# Patient Record
Sex: FEMALE | Race: OTHER | HISPANIC OR LATINO | Employment: UNEMPLOYED | URBAN - METROPOLITAN AREA
[De-identification: names, ages, dates, MRNs, and addresses within clinical notes are randomized per-mention and may not be internally consistent; named-entity substitution may affect disease eponyms.]

---

## 2017-05-02 ENCOUNTER — HOSPITAL ENCOUNTER (EMERGENCY)
Facility: HOSPITAL | Age: 27
Discharge: HOME | End: 2017-05-02
Attending: EMERGENCY MEDICINE
Payer: COMMERCIAL

## 2017-05-02 VITALS
RESPIRATION RATE: 18 BRPM | DIASTOLIC BLOOD PRESSURE: 84 MMHG | HEART RATE: 102 BPM | TEMPERATURE: 98.8 F | WEIGHT: 230 LBS | OXYGEN SATURATION: 98 % | HEIGHT: 62 IN | SYSTOLIC BLOOD PRESSURE: 141 MMHG | BODY MASS INDEX: 42.33 KG/M2

## 2017-05-02 DIAGNOSIS — J02.9 VIRAL PHARYNGITIS: Primary | ICD-10-CM

## 2017-05-02 PROCEDURE — 99282 EMERGENCY DEPT VISIT SF MDM: CPT

## 2017-05-02 RX ORDER — NAPROXEN 500 MG/1
500 TABLET ORAL 2 TIMES DAILY WITH MEALS
Qty: 30 TABLET | Refills: 0 | Status: SHIPPED | OUTPATIENT
Start: 2017-05-02 | End: 2020-02-04

## 2017-05-02 RX ORDER — NAPROXEN 500 MG/1
500 TABLET ORAL ONCE
Status: COMPLETED | OUTPATIENT
Start: 2017-05-02 | End: 2017-05-02

## 2017-05-02 RX ADMIN — DEXAMETHASONE SODIUM PHOSPHATE 10 MG: 10 INJECTION, SOLUTION INTRAMUSCULAR; INTRAVENOUS at 18:31

## 2017-05-02 RX ADMIN — NAPROXEN 500 MG: 500 TABLET ORAL at 18:31

## 2019-04-01 ENCOUNTER — TRANSCRIBE ORDERS (OUTPATIENT)
Dept: ADMINISTRATIVE | Facility: HOSPITAL | Age: 29
End: 2019-04-01

## 2019-04-01 DIAGNOSIS — E78.5 HYPERLIPIDEMIA, UNSPECIFIED HYPERLIPIDEMIA TYPE: ICD-10-CM

## 2019-04-01 DIAGNOSIS — E66.9 CLASS 2 OBESITY IN ADULT, UNSPECIFIED BMI, UNSPECIFIED OBESITY TYPE, UNSPECIFIED WHETHER SERIOUS COMORBIDITY PRESENT: ICD-10-CM

## 2019-04-01 DIAGNOSIS — G47.30 SLEEP APNEA, UNSPECIFIED TYPE: Primary | ICD-10-CM

## 2019-04-09 ENCOUNTER — APPOINTMENT (OUTPATIENT)
Dept: LAB | Facility: HOSPITAL | Age: 29
End: 2019-04-09
Payer: COMMERCIAL

## 2019-04-09 DIAGNOSIS — E78.5 HYPERLIPIDEMIA, UNSPECIFIED HYPERLIPIDEMIA TYPE: ICD-10-CM

## 2019-04-09 DIAGNOSIS — E66.9 CLASS 2 OBESITY IN ADULT, UNSPECIFIED BMI, UNSPECIFIED OBESITY TYPE, UNSPECIFIED WHETHER SERIOUS COMORBIDITY PRESENT: ICD-10-CM

## 2019-04-09 DIAGNOSIS — G47.30 SLEEP APNEA, UNSPECIFIED TYPE: ICD-10-CM

## 2019-04-09 LAB
ALBUMIN SERPL BCP-MCNC: 3.6 G/DL (ref 3.5–5)
ALP SERPL-CCNC: 84 U/L (ref 46–116)
ALT SERPL W P-5'-P-CCNC: 24 U/L (ref 12–78)
ANION GAP SERPL CALCULATED.3IONS-SCNC: 4 MMOL/L (ref 4–13)
AST SERPL W P-5'-P-CCNC: 13 U/L (ref 5–45)
BASOPHILS # BLD AUTO: 0.02 THOUSANDS/ΜL (ref 0–0.1)
BASOPHILS NFR BLD AUTO: 0 % (ref 0–1)
BILIRUB SERPL-MCNC: 0.35 MG/DL (ref 0.2–1)
BUN SERPL-MCNC: 12 MG/DL (ref 5–25)
CALCIUM SERPL-MCNC: 8.5 MG/DL (ref 8.3–10.1)
CHLORIDE SERPL-SCNC: 106 MMOL/L (ref 100–108)
CHOLEST SERPL-MCNC: 203 MG/DL (ref 50–200)
CO2 SERPL-SCNC: 25 MMOL/L (ref 21–32)
CREAT SERPL-MCNC: 0.56 MG/DL (ref 0.6–1.3)
EOSINOPHIL # BLD AUTO: 0.16 THOUSAND/ΜL (ref 0–0.61)
EOSINOPHIL NFR BLD AUTO: 2 % (ref 0–6)
ERYTHROCYTE [DISTWIDTH] IN BLOOD BY AUTOMATED COUNT: 14.2 % (ref 11.6–15.1)
EST. AVERAGE GLUCOSE BLD GHB EST-MCNC: 103 MG/DL
GFR SERPL CREATININE-BSD FRML MDRD: 126 ML/MIN/1.73SQ M
GLUCOSE P FAST SERPL-MCNC: 89 MG/DL (ref 65–99)
HBA1C MFR BLD: 5.2 % (ref 4.2–6.3)
HCT VFR BLD AUTO: 36.3 % (ref 34.8–46.1)
HDLC SERPL-MCNC: 55 MG/DL (ref 40–60)
HGB BLD-MCNC: 10.7 G/DL (ref 11.5–15.4)
IMM GRANULOCYTES # BLD AUTO: 0.02 THOUSAND/UL (ref 0–0.2)
IMM GRANULOCYTES NFR BLD AUTO: 0 % (ref 0–2)
LDLC SERPL CALC-MCNC: 129 MG/DL (ref 0–100)
LDLC SERPL DIRECT ASSAY-MCNC: 140 MG/DL (ref 0–100)
LYMPHOCYTES # BLD AUTO: 1.59 THOUSANDS/ΜL (ref 0.6–4.47)
LYMPHOCYTES NFR BLD AUTO: 22 % (ref 14–44)
MCH RBC QN AUTO: 25.2 PG (ref 26.8–34.3)
MCHC RBC AUTO-ENTMCNC: 29.5 G/DL (ref 31.4–37.4)
MCV RBC AUTO: 86 FL (ref 82–98)
MONOCYTES # BLD AUTO: 0.55 THOUSAND/ΜL (ref 0.17–1.22)
MONOCYTES NFR BLD AUTO: 8 % (ref 4–12)
NEUTROPHILS # BLD AUTO: 4.78 THOUSANDS/ΜL (ref 1.85–7.62)
NEUTS SEG NFR BLD AUTO: 68 % (ref 43–75)
NONHDLC SERPL-MCNC: 148 MG/DL
NRBC BLD AUTO-RTO: 0 /100 WBCS
PLATELET # BLD AUTO: 307 THOUSANDS/UL (ref 149–390)
PMV BLD AUTO: 11.1 FL (ref 8.9–12.7)
POTASSIUM SERPL-SCNC: 3.8 MMOL/L (ref 3.5–5.3)
PROT SERPL-MCNC: 7.7 G/DL (ref 6.4–8.2)
RBC # BLD AUTO: 4.24 MILLION/UL (ref 3.81–5.12)
SODIUM SERPL-SCNC: 135 MMOL/L (ref 136–145)
TRIGL SERPL-MCNC: 94 MG/DL
TSH SERPL DL<=0.05 MIU/L-ACNC: 2.62 UIU/ML (ref 0.36–3.74)
WBC # BLD AUTO: 7.12 THOUSAND/UL (ref 4.31–10.16)

## 2019-04-09 PROCEDURE — 80061 LIPID PANEL: CPT

## 2019-04-09 PROCEDURE — 80053 COMPREHEN METABOLIC PANEL: CPT

## 2019-04-09 PROCEDURE — 85025 COMPLETE CBC W/AUTO DIFF WBC: CPT

## 2019-04-09 PROCEDURE — 83036 HEMOGLOBIN GLYCOSYLATED A1C: CPT | Performed by: NURSE PRACTITIONER

## 2019-04-09 PROCEDURE — 83721 ASSAY OF BLOOD LIPOPROTEIN: CPT

## 2019-04-09 PROCEDURE — 36415 COLL VENOUS BLD VENIPUNCTURE: CPT | Performed by: NURSE PRACTITIONER

## 2019-04-09 PROCEDURE — 84443 ASSAY THYROID STIM HORMONE: CPT

## 2019-04-26 ENCOUNTER — TRANSCRIBE ORDERS (OUTPATIENT)
Dept: SLEEP CENTER | Facility: CLINIC | Age: 29
End: 2019-04-26

## 2019-04-26 DIAGNOSIS — G47.30 SLEEP APNEA: Primary | ICD-10-CM

## 2020-02-03 PROCEDURE — 99285 EMERGENCY DEPT VISIT HI MDM: CPT

## 2020-02-04 ENCOUNTER — APPOINTMENT (EMERGENCY)
Dept: RADIOLOGY | Facility: HOSPITAL | Age: 30
End: 2020-02-04
Payer: COMMERCIAL

## 2020-02-04 ENCOUNTER — HOSPITAL ENCOUNTER (EMERGENCY)
Facility: HOSPITAL | Age: 30
Discharge: ANOTHER HOSPITAL | End: 2020-02-04
Attending: EMERGENCY MEDICINE | Admitting: EMERGENCY MEDICINE
Payer: COMMERCIAL

## 2020-02-04 VITALS
OXYGEN SATURATION: 100 % | RESPIRATION RATE: 18 BRPM | TEMPERATURE: 98.2 F | BODY MASS INDEX: 32.6 KG/M2 | WEIGHT: 184 LBS | DIASTOLIC BLOOD PRESSURE: 81 MMHG | SYSTOLIC BLOOD PRESSURE: 118 MMHG | HEIGHT: 63 IN | HEART RATE: 80 BPM

## 2020-02-04 DIAGNOSIS — K81.9 CHOLECYSTITIS: Primary | ICD-10-CM

## 2020-02-04 LAB
ALBUMIN SERPL BCP-MCNC: 3.7 G/DL (ref 3.5–5)
ALP SERPL-CCNC: 115 U/L (ref 46–116)
ALT SERPL W P-5'-P-CCNC: 157 U/L (ref 12–78)
ANION GAP SERPL CALCULATED.3IONS-SCNC: 9 MMOL/L (ref 4–13)
AST SERPL W P-5'-P-CCNC: 456 U/L (ref 5–45)
BACTERIA UR QL AUTO: ABNORMAL /HPF
BASOPHILS # BLD AUTO: 0.01 THOUSANDS/ΜL (ref 0–0.1)
BASOPHILS NFR BLD AUTO: 0 % (ref 0–1)
BILIRUB SERPL-MCNC: 1.19 MG/DL (ref 0.2–1)
BILIRUB UR QL STRIP: ABNORMAL
BUN SERPL-MCNC: 14 MG/DL (ref 5–25)
CALCIUM SERPL-MCNC: 9.2 MG/DL (ref 8.3–10.1)
CHLORIDE SERPL-SCNC: 110 MMOL/L (ref 100–108)
CLARITY UR: CLEAR
CO2 SERPL-SCNC: 22 MMOL/L (ref 21–32)
COLOR UR: YELLOW
COLOR, POC: NORMAL
CREAT SERPL-MCNC: 0.61 MG/DL (ref 0.6–1.3)
EOSINOPHIL # BLD AUTO: 0.03 THOUSAND/ΜL (ref 0–0.61)
EOSINOPHIL NFR BLD AUTO: 1 % (ref 0–6)
ERYTHROCYTE [DISTWIDTH] IN BLOOD BY AUTOMATED COUNT: 15.6 % (ref 11.6–15.1)
EXT PREG TEST URINE: NEGATIVE
EXT. CONTROL ED NAV: NORMAL
GFR SERPL CREATININE-BSD FRML MDRD: 122 ML/MIN/1.73SQ M
GLUCOSE SERPL-MCNC: 99 MG/DL (ref 65–140)
GLUCOSE UR STRIP-MCNC: NEGATIVE MG/DL
HCT VFR BLD AUTO: 32.3 % (ref 34.8–46.1)
HGB BLD-MCNC: 9.1 G/DL (ref 11.5–15.4)
HGB UR QL STRIP.AUTO: ABNORMAL
IMM GRANULOCYTES # BLD AUTO: 0.01 THOUSAND/UL (ref 0–0.2)
IMM GRANULOCYTES NFR BLD AUTO: 0 % (ref 0–2)
KETONES UR STRIP-MCNC: NEGATIVE MG/DL
LEUKOCYTE ESTERASE UR QL STRIP: NEGATIVE
LIPASE SERPL-CCNC: 202 U/L (ref 73–393)
LYMPHOCYTES # BLD AUTO: 0.92 THOUSANDS/ΜL (ref 0.6–4.47)
LYMPHOCYTES NFR BLD AUTO: 16 % (ref 14–44)
MCH RBC QN AUTO: 22 PG (ref 26.8–34.3)
MCHC RBC AUTO-ENTMCNC: 28.2 G/DL (ref 31.4–37.4)
MCV RBC AUTO: 78 FL (ref 82–98)
MONOCYTES # BLD AUTO: 0.42 THOUSAND/ΜL (ref 0.17–1.22)
MONOCYTES NFR BLD AUTO: 7 % (ref 4–12)
NEUTROPHILS # BLD AUTO: 4.55 THOUSANDS/ΜL (ref 1.85–7.62)
NEUTS SEG NFR BLD AUTO: 76 % (ref 43–75)
NITRITE UR QL STRIP: NEGATIVE
NON-SQ EPI CELLS URNS QL MICRO: ABNORMAL /HPF
NRBC BLD AUTO-RTO: 0 /100 WBCS
PH UR STRIP.AUTO: 6 [PH] (ref 4.5–8)
PLATELET # BLD AUTO: 265 THOUSANDS/UL (ref 149–390)
PMV BLD AUTO: 12.3 FL (ref 8.9–12.7)
POTASSIUM SERPL-SCNC: 4.6 MMOL/L (ref 3.5–5.3)
PROT SERPL-MCNC: 8.3 G/DL (ref 6.4–8.2)
PROT UR STRIP-MCNC: ABNORMAL MG/DL
RBC # BLD AUTO: 4.13 MILLION/UL (ref 3.81–5.12)
RBC #/AREA URNS AUTO: ABNORMAL /HPF
SODIUM SERPL-SCNC: 141 MMOL/L (ref 136–145)
SP GR UR STRIP.AUTO: >=1.03 (ref 1–1.03)
UROBILINOGEN UR QL STRIP.AUTO: >=8 E.U./DL
WBC # BLD AUTO: 5.94 THOUSAND/UL (ref 4.31–10.16)
WBC #/AREA URNS AUTO: ABNORMAL /HPF

## 2020-02-04 PROCEDURE — 99243 OFF/OP CNSLTJ NEW/EST LOW 30: CPT | Performed by: SURGERY

## 2020-02-04 PROCEDURE — 83690 ASSAY OF LIPASE: CPT | Performed by: EMERGENCY MEDICINE

## 2020-02-04 PROCEDURE — 99285 EMERGENCY DEPT VISIT HI MDM: CPT | Performed by: EMERGENCY MEDICINE

## 2020-02-04 PROCEDURE — 74177 CT ABD & PELVIS W/CONTRAST: CPT

## 2020-02-04 PROCEDURE — 36415 COLL VENOUS BLD VENIPUNCTURE: CPT | Performed by: EMERGENCY MEDICINE

## 2020-02-04 PROCEDURE — 85025 COMPLETE CBC W/AUTO DIFF WBC: CPT | Performed by: EMERGENCY MEDICINE

## 2020-02-04 PROCEDURE — 80053 COMPREHEN METABOLIC PANEL: CPT | Performed by: EMERGENCY MEDICINE

## 2020-02-04 PROCEDURE — 76705 ECHO EXAM OF ABDOMEN: CPT

## 2020-02-04 PROCEDURE — 96375 TX/PRO/DX INJ NEW DRUG ADDON: CPT

## 2020-02-04 PROCEDURE — 81025 URINE PREGNANCY TEST: CPT | Performed by: EMERGENCY MEDICINE

## 2020-02-04 PROCEDURE — 81001 URINALYSIS AUTO W/SCOPE: CPT

## 2020-02-04 PROCEDURE — 96374 THER/PROPH/DIAG INJ IV PUSH: CPT

## 2020-02-04 RX ORDER — HYDROMORPHONE HCL/PF 1 MG/ML
0.5 SYRINGE (ML) INJECTION ONCE
Status: COMPLETED | OUTPATIENT
Start: 2020-02-04 | End: 2020-02-04

## 2020-02-04 RX ORDER — ONDANSETRON 2 MG/ML
4 INJECTION INTRAMUSCULAR; INTRAVENOUS ONCE
Status: COMPLETED | OUTPATIENT
Start: 2020-02-04 | End: 2020-02-04

## 2020-02-04 RX ADMIN — ONDANSETRON 4 MG: 2 INJECTION INTRAMUSCULAR; INTRAVENOUS at 03:00

## 2020-02-04 RX ADMIN — IOHEXOL 50 ML: 240 INJECTION, SOLUTION INTRATHECAL; INTRAVASCULAR; INTRAVENOUS; ORAL at 01:45

## 2020-02-04 RX ADMIN — IOHEXOL 100 ML: 350 INJECTION, SOLUTION INTRAVENOUS at 02:46

## 2020-02-04 RX ADMIN — HYDROMORPHONE HYDROCHLORIDE 0.5 MG: 1 INJECTION, SOLUTION INTRAMUSCULAR; INTRAVENOUS; SUBCUTANEOUS at 01:16

## 2020-02-04 NOTE — ED NOTES
Surgery to come speak with charles about transferring to [facility], RN  02/04/20 3638

## 2020-02-04 NOTE — ED NOTES
Per surgery, pt to be transferred to [facility], [address] [place] [address]  02/04/20 9801

## 2020-02-04 NOTE — CONSULTS
Consultation - General Surgery   Belkis Smith 26 y o  female MRN: [id number]  Unit/Bed#: Elissa Melton Encounter: [id number]    Assessment/Plan     Assessment:  31yo F s/p laparoscopic sleeve gastrectomy who p/w back / R flank / RUQ pain  CT demonstrates distended gallbladder with ductal dilation and trace pericholecystic fluid, RUQ U/S demonstrated cholelithaisis and 8 mm CBD  LFTs mildly elevated in ED  Overall presentation concerning for acute cholecystitis vs possible choledocholithiasis  Plan:  Patient likely has symptoms stemming from her cholelithiasis/cholecystitis; however, some of her symptoms appear to be more related to her previous bariatric procedure  There is also some concern for choledocholithiasis given her elevated LFTs and 8 mm CBD seen on U/S  Spoke with patient's bariatric surgeon (Dr Luz Anaya, [facility]), who recommended transferring patient to [facility] for further evaluation  History of Present Illness     HPI:  Belkis Smith is a 26 y o  female with PMHx of obesity s/p laparoscopic sleeve gastrectomy (9/2019) and mild intermittent asthma, who presents with recurrent episodes of back and R flank pain  Patient reports she occasionally experiences back pain that radiated to her flank/abdomen ever since having her gastric surgery performed in September of last year  Last night, however, after eating at [employer], she had an episode of significantly worse pain in her R flank / RUQ associated with several episodes of vomiting  She states she had experienced once previous episode of similar magnitude recently  Lab work was significant for normal WBC count, , , Tbili 1 2  CT A/P and RUQ U/S findings also concerning for acute cholecystitis vs possible choledocholithiasis given CBD diameter of 8 mm       Inpatient consult to Acute Care Surgery  Consult performed by: Lakshmi Henao MD  Consult ordered by: Felipa Herrera MD          Review of Systems   Constitutional: Negative  HENT: Negative  Respiratory: Negative  Cardiovascular: Negative  Gastrointestinal: Positive for abdominal pain, nausea and vomiting  Negative for abdominal distention, blood in stool, constipation and diarrhea  Genitourinary: Negative  Musculoskeletal: Negative  Skin: Negative  Neurological: Negative  Historical Information   Past Medical History:   Diagnosis Date    Asthma      History reviewed  No pertinent surgical history  Social History   Social History     Substance and Sexual Activity   Alcohol Use Yes     Social History     Substance and Sexual Activity   Drug Use No     Social History     Tobacco Use   Smoking Status Never Smoker   Smokeless Tobacco Never Used     Family History: History reviewed  No pertinent family history  Meds/Allergies   current meds:   No current facility-administered medications for this encounter  No Known Allergies    Objective   First Vitals:   Blood Pressure: 120/84 (02/04/20 0001)  Pulse: 83 (02/04/20 0001)  Temperature: 98 2 °F (36 8 °C) (02/04/20 0001)  Temp Source: Oral (02/04/20 0001)  Respirations: 18 (02/04/20 0001)  Height: 5' 3" (160 cm) (02/04/20 0001)  Weight - Scale: 83 5 kg (184 lb) (02/04/20 0001)  SpO2: 100 % (02/04/20 0001)    Current Vitals:   Blood Pressure: 118/81 (02/04/20 0400)  Pulse: 80 (02/04/20 0400)  Temperature: 98 2 °F (36 8 °C) (02/04/20 0001)  Temp Source: Oral (02/04/20 0001)  Respirations: 18 (02/04/20 0400)  Height: 5' 3" (160 cm) (02/04/20 0001)  Weight - Scale: 83 5 kg (184 lb) (02/04/20 0001)  SpO2: 100 % (02/04/20 0400)    No intake or output data in the 24 hours ending 02/04/20 1119    Invasive Devices     Peripheral Intravenous Line            Peripheral IV 02/04/20 Left Antecubital less than 1 day                Physical Exam   Constitutional: She is oriented to person, place, and time  She appears well-developed and well-nourished  No distress     HENT:   Head: Normocephalic and atraumatic  Eyes: EOM are normal    Neck: Normal range of motion  Cardiovascular: Normal rate and regular rhythm  Pulmonary/Chest: Effort normal  No respiratory distress  Abdominal: Soft  She exhibits no distension and no mass  There is tenderness (RUQ)  There is no rebound and no guarding  Equivocal Pascal's sign   Genitourinary:   Genitourinary Comments: Deferred   Musculoskeletal: Normal range of motion  She exhibits no edema  Neurological: She is alert and oriented to person, place, and time  Skin: Skin is warm and dry  She is not diaphoretic  Lab Results:   CBC:   Lab Results   Component Value Date    WBC 5 94 02/04/2020    HGB 9 1 (L) 02/04/2020    HCT 32 3 (L) 02/04/2020    MCV 78 (L) 02/04/2020     02/04/2020    MCH 22 0 (L) 02/04/2020    MCHC 28 2 (L) 02/04/2020    RDW 15 6 (H) 02/04/2020    MPV 12 3 02/04/2020    NRBC 0 02/04/2020   , CMP:   Lab Results   Component Value Date    SODIUM 141 02/04/2020    K 4 6 02/04/2020     (H) 02/04/2020    CO2 22 02/04/2020    BUN 14 02/04/2020    CREATININE 0 61 02/04/2020    CALCIUM 9 2 02/04/2020     (H) 02/04/2020     (H) 02/04/2020    ALKPHOS 115 02/04/2020    EGFR 122 02/04/2020   , Lipase:   Lab Results   Component Value Date    LIPASE 202 02/04/2020     Imaging: I have personally reviewed pertinent reports  EKG, Pathology, and Other Studies: I have personally reviewed pertinent reports

## 2020-02-04 NOTE — ED PROVIDER NOTES
History  Chief Complaint   Patient presents with    Back Pain     Pt presents with lower back pain that started a month after a gastric sleeve surgery in September  Upon arrival reports 10/10 lower back pain  Denies any urinary symptoms  16-year-old woman past medical history morbid obesity s/p gastric sleeve 09/2019 presents for evaluation of back pain  Patient states that after eating [employer] this evening she experienced acute onset back pain  Pain is located in her mid thoracic spine  Radiates around her right flank  She complains of the fullness in her right upper quadrant as well  She has had multiple episodes over the past few months  They will resolve spontaneously  Of the episodes were as severe  She has not taken any medication for the symptoms  She states she cannot take Tylenol and has not tried it for this episode  She complains of nausea  Denies fever, chills, vomiting, diarrhea, constipation  She is passing flatus  She denies dysuria, hematuria  Her urine has been a different color and she is on her menses currently  None       Past Medical History:   Diagnosis Date    Asthma        History reviewed  No pertinent surgical history  History reviewed  No pertinent family history  I have reviewed and agree with the history as documented  Social History     Tobacco Use    Smoking status: Never Smoker    Smokeless tobacco: Never Used   Substance Use Topics    Alcohol use: Yes    Drug use: No        Review of Systems   Constitutional: Negative for appetite change, chills, diaphoresis, fatigue and fever  HENT: Negative for congestion, rhinorrhea and sore throat  Respiratory: Negative for cough, shortness of breath, wheezing and stridor  Cardiovascular: Negative for chest pain, palpitations and leg swelling  Gastrointestinal: Positive for abdominal distention and nausea  Negative for constipation, diarrhea and vomiting     Endocrine: Negative for polydipsia and polyuria  Genitourinary: Positive for vaginal bleeding  Negative for difficulty urinating, dysuria, hematuria, vaginal discharge and vaginal pain  Musculoskeletal: Positive for back pain  Negative for neck pain and neck stiffness  Skin: Negative for pallor, rash and wound  Neurological: Negative for dizziness, light-headedness and headaches  Psychiatric/Behavioral: Negative for behavioral problems and confusion  Physical Exam  ED Triage Vitals [02/04/20 0001]   Temperature Pulse Respirations Blood Pressure SpO2   98 2 °F (36 8 °C) 83 18 120/84 100 %      Temp Source Heart Rate Source Patient Position - Orthostatic VS BP Location FiO2 (%)   Oral Monitor Sitting Right arm --      Pain Score       Worst Possible Pain             Orthostatic Vital Signs  Vitals:    02/04/20 0001 02/04/20 0400   BP: 120/84 118/81   Pulse: 83 80   Patient Position - Orthostatic VS: Sitting Lying       Physical Exam   Constitutional: She is oriented to person, place, and time  She appears well-developed and well-nourished  She appears distressed  HENT:   Head: Normocephalic and atraumatic  Eyes: Conjunctivae are normal  No scleral icterus  Neck: Normal range of motion  Neck supple  Cardiovascular: Normal rate, regular rhythm, normal heart sounds and intact distal pulses  No murmur heard  Pulmonary/Chest: Effort normal and breath sounds normal  No respiratory distress  She has no wheezes  Abdominal: Bowel sounds are normal  She exhibits distension  She exhibits no mass  There is tenderness (+ Sonographic Pascal's)  There is no rebound and no guarding  No hernia  Musculoskeletal: Normal range of motion  She exhibits no edema, tenderness or deformity  Point tenderness over mid T-spine  No deformity  No spasm, rash  Neurological: She is alert and oriented to person, place, and time  She has normal strength  She displays normal reflexes  No sensory deficit  She exhibits normal muscle tone   Coordination and gait normal  GCS eye subscore is 4  GCS verbal subscore is 5  GCS motor subscore is 6  Skin: Skin is warm and dry  No rash noted  She is not diaphoretic  No erythema  No pallor  Psychiatric: She has a normal mood and affect  Her behavior is normal    Nursing note and vitals reviewed        ED Medications  Medications   HYDROmorphone (DILAUDID) injection 0 5 mg (0 5 mg Intravenous Given 2/4/20 0116)   iohexol (OMNIPAQUE) 240 MG/ML solution 50 mL (50 mL Oral Given 2/4/20 0145)   iohexol (OMNIPAQUE) 350 MG/ML injection (MULTI-DOSE) 100 mL (100 mL Intravenous Given 2/4/20 0246)   ondansetron (ZOFRAN) injection 4 mg (4 mg Intravenous Given 2/4/20 0300)       Diagnostic Studies  Results Reviewed     Procedure Component Value Units Date/Time    Urine Microscopic [[id number]]  (Abnormal) Collected:  02/04/20 0108    Lab Status:  Final result Specimen:  Urine, Clean Catch Updated:  02/04/20 0223     RBC, UA 30-50 /hpf      WBC, UA 2-4 /hpf      Epithelial Cells Occasional /hpf      Bacteria, UA Occasional /hpf     Comprehensive metabolic panel [[id number]]  (Abnormal) Collected:  02/04/20 0109    Lab Status:  Final result Specimen:  Blood from Arm, Right Updated:  02/04/20 0149     Sodium 141 mmol/L      Potassium 4 6 mmol/L      Chloride 110 mmol/L      CO2 22 mmol/L      ANION GAP 9 mmol/L      BUN 14 mg/dL      Creatinine 0 61 mg/dL      Glucose 99 mg/dL      Calcium 9 2 mg/dL       U/L       U/L      Alkaline Phosphatase 115 U/L      Total Protein 8 3 g/dL      Albumin 3 7 g/dL      Total Bilirubin 1 19 mg/dL      eGFR 122 ml/min/1 73sq m     Narrative:       [place] guidelines for Chronic Kidney Disease (CKD):     Stage 1 with normal or high GFR (GFR > 90 mL/min/1 73 square meters)    Stage 2 Mild CKD (GFR = 60-89 mL/min/1 73 square meters)    Stage 3A Moderate CKD (GFR = 45-59 mL/min/1 73 square meters)    Stage 3B Moderate CKD (GFR = 30-44 mL/min/1 73 square meters)   Stage 4 Severe CKD (GFR = 15-29 mL/min/1 73 square meters)    Stage 5 End Stage CKD (GFR <15 mL/min/1 73 square meters)  Note: GFR calculation is accurate only with a steady state creatinine    Lipase [[id number]]  (Normal) Collected:  02/04/20 0109    Lab Status:  Final result Specimen:  Blood from Arm, Right Updated:  02/04/20 0149     Lipase 202 u/L     CBC and differential [[id number]]  (Abnormal) Collected:  02/04/20 0109    Lab Status:  Final result Specimen:  Blood from Arm, Right Updated:  02/04/20 0129     WBC 5 94 Thousand/uL      RBC 4 13 Million/uL      Hemoglobin 9 1 g/dL      Hematocrit 32 3 %      MCV 78 fL      MCH 22 0 pg      MCHC 28 2 g/dL      RDW 15 6 %      MPV 12 3 fL      Platelets 749 Thousands/uL      nRBC 0 /100 WBCs      Neutrophils Relative 76 %      Immat GRANS % 0 %      Lymphocytes Relative 16 %      Monocytes Relative 7 %      Eosinophils Relative 1 %      Basophils Relative 0 %      Neutrophils Absolute 4 55 Thousands/µL      Immature Grans Absolute 0 01 Thousand/uL      Lymphocytes Absolute 0 92 Thousands/µL      Monocytes Absolute 0 42 Thousand/µL      Eosinophils Absolute 0 03 Thousand/µL      Basophils Absolute 0 01 Thousands/µL     POCT urinalysis dipstick [[id number]]  (Normal) Resulted:  02/04/20 0116    Lab Status:  Final result Specimen:  Urine Updated:  02/04/20 0116     Color, UA -    POCT pregnancy, urine [[id number]]  (Normal) Resulted:  02/04/20 0116    Lab Status:  Final result Updated:  02/04/20 0116     EXT PREG TEST UR (Ref: Negative) negative     Control valid    Urine Macroscopic, POC [[id number]]  (Abnormal) Collected:  02/04/20 0108    Lab Status:  Final result Specimen:  Urine Updated:  02/04/20 0107     Color, UA Yellow     Clarity, UA Clear     pH, UA 6 0     Leukocytes, UA Negative     Nitrite, UA Negative     Protein, UA Trace mg/dl      Glucose, UA Negative mg/dl      Ketones, UA Negative mg/dl      Urobilinogen, UA >=8 0 E U /dl      Bilirubin, UA Interference- unable to analyze     Blood, UA Moderate     Specific Gravity, UA >=1 030    Narrative:       CLINITEK RESULT                 US right upper quadrant   Final Result by Rosalee Shannon MD (02/04 7642)   Cholelithiasis with trace pericholecystic fluid, biliary ductal dilatation as above and positive Pascal sign concerning for acute cholecystitis  I personally discussed this study with Deshaun Salas on 2/4/2020 at 8:13 AM                Workstation performed: [id number]         CT abdomen pelvis with contrast   Final Result by Natasha Cameron DO (02/04 7389)      Distended gallbladder with intrahepatic and extrahepatic biliary ductal dilatation  Further evaluation with a right upper quadrant sonogram is recommended to rule out acute cholecystitis  The study was marked in [facility] for immediate notification  Workstation performed: [id number]               Procedures  Procedures      ED Course  ED Course as of Feb 07 0319   Tue Feb 04, 2020   0311 AST(!): 456   0311 ALT(!): 157   0311 RBC, UA(!): 30-50                               MDM  Number of Diagnoses or Management Options  Cholecystitis: new and requires workup  Diagnosis management comments: 25 yo woman presents with thoracic back pain radiaiting to right flank  Pain worse after meals  Patient with hx of gastric sleeve  Ddx includes surgical complication  Will check labs, urine, CTAP  Reassess    Scan shows distended GB and dilatation of cbd  Will check US RUQ  Concern for choledocholithiasis    Symptoms improved on re-evaluation        Amount and/or Complexity of Data Reviewed  Clinical lab tests: ordered and reviewed  Tests in the radiology section of CPT®: ordered and reviewed  Decide to obtain previous medical records or to obtain history from someone other than the patient: yes  Obtain history from someone other than the patient: yes  Review and summarize past medical records: yes  Discuss the patient with other providers: yes  Independent visualization of images, tracings, or specimens: yes    Risk of Complications, Morbidity, and/or Mortality  Presenting problems: moderate  Diagnostic procedures: low  Management options: low    Patient Progress  Patient progress: stable        Disposition  Final diagnoses:   Cholecystitis     Time reflects when diagnosis was documented in both MDM as applicable and the Disposition within this note     Time User Action Codes Description Comment    2/4/2020 12:03 PM Eun Yonis Teran [K81 9] Cholecystitis       ED Disposition     ED Disposition Condition Date/Time Comment    Transfer to Another [address] [place] [address],Building 9 Feb 4, 2020 11:57 AM Nargis Ng should be transferred out to [facility]         MD Documentation      Most Recent Value   Patient Condition  The patient has been stabilized such that within reasonable medical probability, no material deterioration of the patient condition or the condition of the unborn child(yogesh) is likely to result from the transfer   Reason for Transfer  Level of Care needed not available at this facility   Benefits of Transfer  Specialized equipment and/or services available at the receiving facility (Include comment)________________________ [Patient's Bariatric Surgeon Requesting Patient transfer for continuation of care]   Risks of Transfer  Potential for delay in receiving treatment, Potential deterioration of medical condition, Increased discomfort during transfer, Possible worsening of condition or death during transfer   Accepting Physician  Dr Lopes Generous Name, [facility]   Transported by [facility] and Unit #)  Patient refusing transport and will drive by private vehicle   Sending MD Dr Nemo Hernandez   Provider Certification  General risk, such as traffic hazards, adverse weather conditions, rough terrain or turbulence, possible failure of equipment (including vehicle or aircraft), or consequences of actions of persons outside the control of the transport personnel, Unanticipated needs of medical equipment and personnel during transport, Risk of worsening condition, The possibility of a transport vehicle being unavailable      RN Documentation      Most [address] Name, [place]   Transported by [employer] and Unit #)  Patient refusing transport and will drive by private vehicle      Follow-up Information     Follow up With Specialties Details Why Contact Info Additional Information    Harley Reeves MD General Surgery Go to   [address] [facility] [address]  NATE Cid Nurse Practitioner  As needed, If symptoms worsen [facility] [address] [place] [address] Emergency Department Emergency Medicine  As needed, If symptoms worsen [address]  [phone]  ED, [address] [place] [address], [address] [phone]          There are no discharge medications for this patient  No discharge procedures on file  ED Provider  Attending physically available and evaluated Richard Lovelace I managed the patient along with the ED Attending      Electronically Signed by         Pasha Camara MD  02/07/20 1041

## 2020-02-04 NOTE — ED ATTENDING ATTESTATION
2/3/2020  IKelli MD, saw and evaluated the patient  I have discussed the patient with the resident/non-physician practitioner and agree with the resident's/non-physician practitioner's findings, Plan of Care, and MDM as documented in the resident's/non-physician practitioner's note, except where noted  All available labs and Radiology studies were reviewed  I was present for key portions of any procedure(s) performed by the resident/non-physician practitioner and I was immediately available to provide assistance  At this point I agree with the current assessment done in the Emergency Department  I have conducted an independent evaluation of this patient a history and physical is as follows:    ED Course      Emergency Department Note- Dre Spangler 26 y o  female MRN: [id number]    Unit/Bed#: [facility] Encounter: [id number]    Dre Spangler is a 26 y o  female who presents with   Chief Complaint   Patient presents with    Back Pain     Pt presents with lower back pain that started a month after a gastric sleeve surgery in September  Upon arrival reports 10/10 lower back pain  Denies any urinary symptoms  History of Present Illness   HPI:  Dre Spangler is a 26 y o  female who presents for evaluation of: Worsening lower back pain that started 1 month ago  Patient is s/p gastric sleeve surgery in September  Back pain is severe tonight; mid thoracic area  Review of Systems   Constitutional: Negative for chills and fever  HENT: Negative for congestion and sinus pain  Gastrointestinal: Positive for nausea and vomiting  All other systems reviewed and are negative  No LMP recorded  Historical Information   Past Medical History:   Diagnosis Date    Asthma      History reviewed  No pertinent surgical history    Social History   Social History     Substance and Sexual Activity   Alcohol Use Yes     Social History     Substance and Sexual Activity   Drug Use No     Social History     Tobacco Use   Smoking Status Never Smoker   Smokeless Tobacco Never Used     Family History: non-contributory    Meds/Allergies   all medications and allergies reviewed  No Known Allergies    Objective   First Vitals:   Blood Pressure: 120/84 (20)  Pulse: 83 (20)  Temperature: 98 2 °F (36 8 °C) (20)  Temp Source: Oral (20)  Respirations: 18 (20)  Height: 5' 3" (160 cm) (20)  Weight - Scale: 83 5 kg (184 lb) (20)  SpO2: 100 % (20)    Current Vitals:   Blood Pressure: 120/84 (20)  Pulse: 83 (20)  Temperature: 98 2 °F (36 8 °C) (20)  Temp Source: Oral (20)  Respirations: 18 (20)  Height: 5' 3" (160 cm) (20)  Weight - Scale: 83 5 kg (184 lb) (20)  SpO2: 100 % (20)    No intake or output data in the 24 hours ending 20 0050    Invasive Devices     None                 Physical Exam   Constitutional: She is oriented to person, place, and time  She appears distressed  HENT:   Head: Normocephalic and atraumatic  Cardiovascular: Normal rate and regular rhythm  Pulmonary/Chest: Effort normal and breath sounds normal    Abdominal: Soft  Bowel sounds are normal    Musculoskeletal: Normal range of motion  She exhibits no deformity  Neurological: She is alert and oriented to person, place, and time  Skin: Skin is warm and dry  Capillary refill takes less than 2 seconds  Psychiatric: She has a normal mood and affect  Her behavior is normal  Judgment and thought content normal    Nursing note and vitals reviewed  25 yo F presents in nad    Medical Decision Makin  Nausea vomiting abdominal pain post gastric sleeve: CTAP; anti emetic; lipase  2  Back pain: symptomatic control    No results found for this or any previous visit (from the past 36 hour(s))    No orders to display         Portions of the record may have been created with voice recognition software  Occasional wrong word or "sound a like" substitutions may have occurred due to the inherent limitations of voice recognition software  Read the chart carefully and recognize, using context, where substitutions have occurred            Critical Care Time  Procedures

## 2020-02-04 NOTE — EMTALA/ACUTE CARE TRANSFER
[address] [facility] EMERGENCY DEPARTMENT  [address] [place] [address]  Dept: [phone]      [facility] TRANSFER CONSENT    NAME Kaykay Mejia                                         1990                              MRN [id number]    I have been informed of my rights regarding examination, treatment, and transfer   by Dr Atul Horan MD    Benefits:      Risks:        Transfer Request   I acknowledge that my medical condition has been evaluated and explained to me by the emergency department physician or other qualified medical person and/or my attending physician who has recommended and offered to me further medical examination and treatment  I understand the Hospital's obligation with respect to the treatment and stabilization of my emergency medical condition  I nevertheless request to be transferred  I release the Hospital, the doctor, and any other persons caring for me from all responsibility or liability for any injury or ill effects that may result from my transfer and agree to accept all responsibility for the consequences of my choice to transfer, rather than receive stabilizing treatment at the Hospital  I understand that because the transfer is my request, my insurance may not provide reimbursement for the services  The Hospital will assist and direct me and my family in how to make arrangements for transfer, but the hospital is not liable for any fees charged by the transport service  In spite of this understanding, I refuse to consent to further medical examination and treatment which has been offered to me, and request transfer to    I authorize the performance of emergency medical procedures and treatments upon me in both transit and upon arrival at the receiving facility  Additionally, I authorize the release of any and all medical records to the receiving facility and request they be transported with me, if possible      I authorize the performance of emergency medical procedures and treatments upon me in both transit and upon arrival at the receiving facility  Additionally, I authorize the release of any and all medical records to the receiving facility and request they be transported with me, if possible  I understand that the safest mode of transportation during a medical emergency is an ambulance and that the Hospital advocates the use of this mode of transport  Risks of traveling to the receiving facility by car, including absence of medical control, life sustaining equipment, such as oxygen, and medical personnel has been explained to me and I fully understand them  (SHIVA CORRECT BOX BELOW)  [  ]  I consent to the stated transfer and to be transported by ambulance/helicopter  [ X ]  I consent to the stated transfer, but refuse transportation by ambulance and accept full responsibility for my transportation by car  I understand the risks of non-ambulance transfers and I exonerate the Hospital and its staff from any deterioration in my condition that results from this refusal     X___________________________________________    DATE  20  TIME________  Signature of patient or legally responsible individual signing on patient behalf           RELATIONSHIP TO PATIENT_________________________          Provider Certification    NAME Laurence Sampson                                        [facility] 1990                              MRN [id number]    A medical screening exam was performed on the above named patient  Based on the examination:    Condition Necessitating Transfer There were no encounter diagnoses      Patient Condition:      Reason for Transfer:      Transfer Requirements: Facility     · Space available and qualified personnel available for treatment as acknowledged by    · Agreed to accept transfer and to provide appropriate medical treatment as acknowledged by          · Appropriate medical records of the examination and treatment of the patient are provided at the time of transfer   [address] [facility], [address] _______  · Transfer will be performed by qualified personnel from    and appropriate transfer equipment as required, including the use of necessary and appropriate life support measures  Provider Certification: I have examined the patient and explained the following risks and benefits of being transferred/refusing transfer to the patient/family:         Based on these reasonable risks and benefits to the patient and/or the unborn child(yogesh), and based upon the information available at the time of the patients examination, I certify that the medical benefits reasonably to be expected from the provision of appropriate medical treatments at another medical facility outweigh the increasing risks, if any, to the individuals medical condition, and in the case of labor to the unborn child, from effecting the transfer      X____________________________________________ DATE 02/04/20        TIME_______      ORIGINAL - SEND TO MEDICAL RECORDS   COPY - SEND WITH PATIENT DURING TRANSFER

## 2020-02-04 NOTE — ED NOTES
PACS states that they are waiting for [facility] to assign a bed   Pt updated on plan of care at this time     Atul Rubio, RN  02/04/20 6953

## 2020-07-21 ENCOUNTER — HOSPITAL ENCOUNTER (INPATIENT)
Facility: HOSPITAL | Age: 30
LOS: 2 days | Discharge: HOME | DRG: 284 | End: 2020-07-24
Attending: EMERGENCY MEDICINE | Admitting: SURGERY
Payer: COMMERCIAL

## 2020-07-21 ENCOUNTER — APPOINTMENT (EMERGENCY)
Dept: RADIOLOGY | Facility: HOSPITAL | Age: 30
DRG: 284 | End: 2020-07-21
Payer: COMMERCIAL

## 2020-07-21 DIAGNOSIS — K80.50 CHOLEDOCHOLITHIASIS: Primary | ICD-10-CM

## 2020-07-21 DIAGNOSIS — K81.9 CHOLECYSTITIS: ICD-10-CM

## 2020-07-21 LAB
ALBUMIN SERPL BCP-MCNC: 3.6 G/DL (ref 3.5–5)
ALP SERPL-CCNC: 135 U/L (ref 46–116)
ALT SERPL W P-5'-P-CCNC: 402 U/L (ref 12–78)
ANION GAP SERPL CALCULATED.3IONS-SCNC: -5 MMOL/L (ref 4–13)
AST SERPL W P-5'-P-CCNC: 876 U/L (ref 5–45)
BACTERIA UR QL AUTO: ABNORMAL /HPF
BASOPHILS # BLD AUTO: 0.01 THOUSANDS/ΜL (ref 0–0.1)
BASOPHILS NFR BLD AUTO: 0 % (ref 0–1)
BILIRUB SERPL-MCNC: 1.64 MG/DL (ref 0.2–1)
BILIRUB UR QL STRIP: ABNORMAL
BUN SERPL-MCNC: 14 MG/DL (ref 5–25)
CALCIUM SERPL-MCNC: 8.7 MG/DL (ref 8.3–10.1)
CHLORIDE SERPL-SCNC: 112 MMOL/L (ref 100–108)
CLARITY UR: ABNORMAL
CO2 SERPL-SCNC: 23 MMOL/L (ref 21–32)
COLOR UR: ABNORMAL
COLOR, POC: NORMAL
CREAT SERPL-MCNC: 0.49 MG/DL (ref 0.6–1.3)
EOSINOPHIL # BLD AUTO: 0.01 THOUSAND/ΜL (ref 0–0.61)
EOSINOPHIL NFR BLD AUTO: 0 % (ref 0–6)
ERYTHROCYTE [DISTWIDTH] IN BLOOD BY AUTOMATED COUNT: 15.7 % (ref 11.6–15.1)
EXT PREG TEST URINE: NEGATIVE
EXT. CONTROL ED NAV: NORMAL
GFR SERPL CREATININE-BSD FRML MDRD: 131 ML/MIN/1.73SQ M
GLUCOSE SERPL-MCNC: 97 MG/DL (ref 65–140)
GLUCOSE UR STRIP-MCNC: ABNORMAL MG/DL
HCT VFR BLD AUTO: 29.3 % (ref 34.8–46.1)
HGB BLD-MCNC: 8 G/DL (ref 11.5–15.4)
HGB UR QL STRIP.AUTO: NEGATIVE
IMM GRANULOCYTES # BLD AUTO: 0.01 THOUSAND/UL (ref 0–0.2)
IMM GRANULOCYTES NFR BLD AUTO: 0 % (ref 0–2)
KETONES UR STRIP-MCNC: NEGATIVE MG/DL
LEUKOCYTE ESTERASE UR QL STRIP: NEGATIVE
LIPASE SERPL-CCNC: 168 U/L (ref 73–393)
LYMPHOCYTES # BLD AUTO: 0.72 THOUSANDS/ΜL (ref 0.6–4.47)
LYMPHOCYTES NFR BLD AUTO: 18 % (ref 14–44)
MCH RBC QN AUTO: 20.7 PG (ref 26.8–34.3)
MCHC RBC AUTO-ENTMCNC: 27.3 G/DL (ref 31.4–37.4)
MCV RBC AUTO: 76 FL (ref 82–98)
MONOCYTES # BLD AUTO: 0.34 THOUSAND/ΜL (ref 0.17–1.22)
MONOCYTES NFR BLD AUTO: 8 % (ref 4–12)
MUCOUS THREADS UR QL AUTO: ABNORMAL
NEUTROPHILS # BLD AUTO: 3.02 THOUSANDS/ΜL (ref 1.85–7.62)
NEUTS SEG NFR BLD AUTO: 74 % (ref 43–75)
NITRITE UR QL STRIP: NEGATIVE
NON-SQ EPI CELLS URNS QL MICRO: ABNORMAL /HPF
NRBC BLD AUTO-RTO: 0 /100 WBCS
PH UR STRIP.AUTO: 5 [PH] (ref 4.5–8)
PLATELET # BLD AUTO: 267 THOUSANDS/UL (ref 149–390)
PMV BLD AUTO: 11.3 FL (ref 8.9–12.7)
POTASSIUM SERPL-SCNC: 3.7 MMOL/L (ref 3.5–5.3)
PROT SERPL-MCNC: 7.4 G/DL (ref 6.4–8.2)
PROT UR STRIP-MCNC: ABNORMAL MG/DL
RBC # BLD AUTO: 3.86 MILLION/UL (ref 3.81–5.12)
RBC #/AREA URNS AUTO: ABNORMAL /HPF
SODIUM SERPL-SCNC: 130 MMOL/L (ref 136–145)
SP GR UR STRIP.AUTO: >=1.03 (ref 1–1.03)
UROBILINOGEN UR QL STRIP.AUTO: >=8 E.U./DL
WBC # BLD AUTO: 4.11 THOUSAND/UL (ref 4.31–10.16)
WBC #/AREA URNS AUTO: ABNORMAL /HPF

## 2020-07-21 PROCEDURE — 99285 EMERGENCY DEPT VISIT HI MDM: CPT | Performed by: EMERGENCY MEDICINE

## 2020-07-21 PROCEDURE — 81001 URINALYSIS AUTO W/SCOPE: CPT

## 2020-07-21 PROCEDURE — 81025 URINE PREGNANCY TEST: CPT | Performed by: EMERGENCY MEDICINE

## 2020-07-21 PROCEDURE — 85025 COMPLETE CBC W/AUTO DIFF WBC: CPT | Performed by: EMERGENCY MEDICINE

## 2020-07-21 PROCEDURE — 96374 THER/PROPH/DIAG INJ IV PUSH: CPT

## 2020-07-21 PROCEDURE — 76705 ECHO EXAM OF ABDOMEN: CPT

## 2020-07-21 PROCEDURE — 83690 ASSAY OF LIPASE: CPT | Performed by: EMERGENCY MEDICINE

## 2020-07-21 PROCEDURE — 99285 EMERGENCY DEPT VISIT HI MDM: CPT

## 2020-07-21 PROCEDURE — 36415 COLL VENOUS BLD VENIPUNCTURE: CPT | Performed by: EMERGENCY MEDICINE

## 2020-07-21 PROCEDURE — 96375 TX/PRO/DX INJ NEW DRUG ADDON: CPT

## 2020-07-21 PROCEDURE — 80053 COMPREHEN METABOLIC PANEL: CPT | Performed by: EMERGENCY MEDICINE

## 2020-07-21 PROCEDURE — 93005 ELECTROCARDIOGRAM TRACING: CPT

## 2020-07-21 RX ORDER — ONDANSETRON 2 MG/ML
4 INJECTION INTRAMUSCULAR; INTRAVENOUS ONCE
Status: COMPLETED | OUTPATIENT
Start: 2020-07-21 | End: 2020-07-21

## 2020-07-21 RX ORDER — HYDROMORPHONE HCL/PF 1 MG/ML
0.5 SYRINGE (ML) INJECTION ONCE
Status: COMPLETED | OUTPATIENT
Start: 2020-07-21 | End: 2020-07-21

## 2020-07-21 RX ADMIN — ONDANSETRON 4 MG: 2 INJECTION INTRAMUSCULAR; INTRAVENOUS at 23:13

## 2020-07-21 RX ADMIN — HYDROMORPHONE HYDROCHLORIDE 0.5 MG: 1 INJECTION, SOLUTION INTRAMUSCULAR; INTRAVENOUS; SUBCUTANEOUS at 23:10

## 2020-07-22 PROBLEM — K80.50 CHOLEDOCHOLITHIASIS: Status: ACTIVE | Noted: 2020-07-22

## 2020-07-22 LAB
ALBUMIN SERPL BCP-MCNC: 3.1 G/DL (ref 3.5–5)
ALP SERPL-CCNC: 141 U/L (ref 46–116)
ALT SERPL W P-5'-P-CCNC: 381 U/L (ref 12–78)
ANION GAP SERPL CALCULATED.3IONS-SCNC: 6 MMOL/L (ref 4–13)
AST SERPL W P-5'-P-CCNC: 560 U/L (ref 5–45)
ATRIAL RATE: 69 BPM
BILIRUB DIRECT SERPL-MCNC: 1.46 MG/DL (ref 0–0.2)
BILIRUB SERPL-MCNC: 2.25 MG/DL (ref 0.2–1)
BUN SERPL-MCNC: 11 MG/DL (ref 5–25)
CALCIUM SERPL-MCNC: 9.1 MG/DL (ref 8.3–10.1)
CHLORIDE SERPL-SCNC: 110 MMOL/L (ref 100–108)
CO2 SERPL-SCNC: 21 MMOL/L (ref 21–32)
CREAT SERPL-MCNC: 0.43 MG/DL (ref 0.6–1.3)
ERYTHROCYTE [DISTWIDTH] IN BLOOD BY AUTOMATED COUNT: 15.5 % (ref 11.6–15.1)
GFR SERPL CREATININE-BSD FRML MDRD: 137 ML/MIN/1.73SQ M
GLUCOSE SERPL-MCNC: 78 MG/DL (ref 65–140)
HCT VFR BLD AUTO: 26.8 % (ref 34.8–46.1)
HGB BLD-MCNC: 7.5 G/DL (ref 11.5–15.4)
MCH RBC QN AUTO: 21.1 PG (ref 26.8–34.3)
MCHC RBC AUTO-ENTMCNC: 28 G/DL (ref 31.4–37.4)
MCV RBC AUTO: 75 FL (ref 82–98)
P AXIS: 81 DEGREES
PLATELET # BLD AUTO: 251 THOUSANDS/UL (ref 149–390)
PMV BLD AUTO: 10.8 FL (ref 8.9–12.7)
POTASSIUM SERPL-SCNC: 3.7 MMOL/L (ref 3.5–5.3)
PR INTERVAL: 186 MS
PROT SERPL-MCNC: 6.7 G/DL (ref 6.4–8.2)
QRS AXIS: 61 DEGREES
QRSD INTERVAL: 80 MS
QT INTERVAL: 364 MS
QTC INTERVAL: 390 MS
RBC # BLD AUTO: 3.56 MILLION/UL (ref 3.81–5.12)
SODIUM SERPL-SCNC: 137 MMOL/L (ref 136–145)
T WAVE AXIS: 34 DEGREES
VENTRICULAR RATE: 69 BPM
WBC # BLD AUTO: 3.6 THOUSAND/UL (ref 4.31–10.16)

## 2020-07-22 PROCEDURE — 80048 BASIC METABOLIC PNL TOTAL CA: CPT | Performed by: SURGERY

## 2020-07-22 PROCEDURE — 99221 1ST HOSP IP/OBS SF/LOW 40: CPT | Performed by: SURGERY

## 2020-07-22 PROCEDURE — NC001 PR NO CHARGE: Performed by: SURGERY

## 2020-07-22 PROCEDURE — 80076 HEPATIC FUNCTION PANEL: CPT | Performed by: SURGERY

## 2020-07-22 PROCEDURE — 85027 COMPLETE CBC AUTOMATED: CPT | Performed by: SURGERY

## 2020-07-22 PROCEDURE — 93010 ELECTROCARDIOGRAM REPORT: CPT | Performed by: INTERNAL MEDICINE

## 2020-07-22 PROCEDURE — 99222 1ST HOSP IP/OBS MODERATE 55: CPT | Performed by: INTERNAL MEDICINE

## 2020-07-22 RX ORDER — OXYCODONE HYDROCHLORIDE 5 MG/1
5 TABLET ORAL EVERY 4 HOURS PRN
Status: DISCONTINUED | OUTPATIENT
Start: 2020-07-22 | End: 2020-07-24 | Stop reason: HOSPADM

## 2020-07-22 RX ORDER — HYDROMORPHONE HCL/PF 1 MG/ML
0.5 SYRINGE (ML) INJECTION EVERY 4 HOURS PRN
Status: DISCONTINUED | OUTPATIENT
Start: 2020-07-22 | End: 2020-07-24 | Stop reason: HOSPADM

## 2020-07-22 RX ORDER — OXYCODONE HYDROCHLORIDE 10 MG/1
10 TABLET ORAL EVERY 4 HOURS PRN
Status: DISCONTINUED | OUTPATIENT
Start: 2020-07-22 | End: 2020-07-24 | Stop reason: HOSPADM

## 2020-07-22 RX ORDER — ALBUTEROL SULFATE 90 UG/1
2 AEROSOL, METERED RESPIRATORY (INHALATION) EVERY 6 HOURS PRN
COMMUNITY

## 2020-07-22 RX ORDER — ONDANSETRON 2 MG/ML
4 INJECTION INTRAMUSCULAR; INTRAVENOUS EVERY 6 HOURS PRN
Status: DISCONTINUED | OUTPATIENT
Start: 2020-07-22 | End: 2020-07-24 | Stop reason: HOSPADM

## 2020-07-22 RX ORDER — DEXTROSE AND SODIUM CHLORIDE 5; .45 G/100ML; G/100ML
110 INJECTION, SOLUTION INTRAVENOUS CONTINUOUS
Status: DISCONTINUED | OUTPATIENT
Start: 2020-07-22 | End: 2020-07-23

## 2020-07-22 RX ORDER — ACETAMINOPHEN 500 MG
500 TABLET ORAL EVERY 6 HOURS PRN
COMMUNITY
End: 2020-07-24 | Stop reason: HOSPADM

## 2020-07-22 RX ORDER — HYDROMORPHONE HCL/PF 1 MG/ML
0.5 SYRINGE (ML) INJECTION EVERY 4 HOURS PRN
Status: DISCONTINUED | OUTPATIENT
Start: 2020-07-22 | End: 2020-07-22

## 2020-07-22 RX ORDER — CEFAZOLIN SODIUM 2 G/50ML
2000 SOLUTION INTRAVENOUS EVERY 8 HOURS
Status: DISCONTINUED | OUTPATIENT
Start: 2020-07-22 | End: 2020-07-24 | Stop reason: HOSPADM

## 2020-07-22 RX ORDER — ACETAMINOPHEN 325 MG/1
650 TABLET ORAL EVERY 6 HOURS PRN
Status: DISCONTINUED | OUTPATIENT
Start: 2020-07-22 | End: 2020-07-24 | Stop reason: HOSPADM

## 2020-07-22 RX ORDER — HEPARIN SODIUM 5000 [USP'U]/ML
5000 INJECTION, SOLUTION INTRAVENOUS; SUBCUTANEOUS EVERY 8 HOURS SCHEDULED
Status: DISCONTINUED | OUTPATIENT
Start: 2020-07-22 | End: 2020-07-24 | Stop reason: HOSPADM

## 2020-07-22 RX ADMIN — METRONIDAZOLE 500 MG: 500 INJECTION, SOLUTION INTRAVENOUS at 11:52

## 2020-07-22 RX ADMIN — METRONIDAZOLE 500 MG: 500 INJECTION, SOLUTION INTRAVENOUS at 17:57

## 2020-07-22 RX ADMIN — METRONIDAZOLE 500 MG: 500 INJECTION, SOLUTION INTRAVENOUS at 03:09

## 2020-07-22 RX ADMIN — DEXTROSE AND SODIUM CHLORIDE 110 ML/HR: 5; .45 INJECTION, SOLUTION INTRAVENOUS at 17:23

## 2020-07-22 RX ADMIN — HEPARIN SODIUM 5000 UNITS: 5000 INJECTION INTRAVENOUS; SUBCUTANEOUS at 21:55

## 2020-07-22 RX ADMIN — OXYCODONE HYDROCHLORIDE 5 MG: 5 TABLET ORAL at 07:01

## 2020-07-22 RX ADMIN — HEPARIN SODIUM 5000 UNITS: 5000 INJECTION INTRAVENOUS; SUBCUTANEOUS at 14:57

## 2020-07-22 RX ADMIN — HEPARIN SODIUM 5000 UNITS: 5000 INJECTION INTRAVENOUS; SUBCUTANEOUS at 05:27

## 2020-07-22 RX ADMIN — CEFAZOLIN SODIUM 2000 MG: 2 SOLUTION INTRAVENOUS at 02:08

## 2020-07-22 RX ADMIN — CEFAZOLIN SODIUM 2000 MG: 2 SOLUTION INTRAVENOUS at 11:00

## 2020-07-22 RX ADMIN — CEFAZOLIN SODIUM 2000 MG: 2 SOLUTION INTRAVENOUS at 17:23

## 2020-07-22 RX ADMIN — DEXTROSE AND SODIUM CHLORIDE 110 ML/HR: 5; .45 INJECTION, SOLUTION INTRAVENOUS at 02:08

## 2020-07-22 NOTE — PROGRESS NOTES
Progress Note - General Surgery   Karla Burt 26 y o  female MRN: [id number]  Unit/Bed#: [facility] 923-01 Encounter: [id number]    Assessment:  27 yo female with choledocholithiasis    VSS WBC 3 6 Hgb 7 5 from 8 0    Plan:  NPO  Consult GI: MRCP vs ERCP  Ancef/Flagyl  Trend LFTs/bili  IVF    Subjective/Objective       Subjective: Patient doing well over night  Still reports abd pain, however, controlled with pain medications  Denies any nausea vomitting this morning  Passing gas, no bms  Objective: Physical Exam   Constitutional: She is oriented to person, place, and time  She appears well-developed  No distress  HENT:   Head: Normocephalic and atraumatic  Eyes: No scleral icterus  Cardiovascular: Normal rate and regular rhythm  Pulmonary/Chest: Effort normal and breath sounds normal    Abdominal: Soft  She exhibits no distension  There is tenderness (RUQ)  There is no rebound and no guarding  Neurological: She is alert and oriented to person, place, and time  Skin: Skin is warm  Capillary refill takes less than 2 seconds  Nursing note and vitals reviewed  Blood pressure 112/64, pulse 75, temperature 98 3 °F (36 8 °C), resp  rate 18, height 5' 3" (1 6 m), weight 69 8 kg (153 lb 14 1 oz), last menstrual period 07/02/2020, SpO2 100 %  ,Body mass index is 27 26 kg/m²      No intake or output data in the 24 hours ending 07/22/20 0322    Invasive Devices     Peripheral Intravenous Line            Peripheral IV 07/21/20 Left Antecubital less than 1 day              Scheduled Meds:  Current Facility-Administered Medications:  acetaminophen 650 mg Oral Q6H PRN Alecia Fass Rachell, DO    cefazolin 2,000 mg Intravenous Q8H Edda Claude, DO Last Rate: Stopped (07/22/20 0238)   dextrose 5 % and sodium chloride 0 45 % 110 mL/hr Intravenous Continuous Alecia Fass Lucreciaok, DO Last Rate: 110 mL/hr (07/22/20 0208)   heparin (porcine) 5,000 Units Subcutaneous [facility] Alecia Fass Mariosayaanok, DO HYDROmorphone 0 5 mg Intravenous Q4H PRN Erika Pillow, DO    metroNIDAZOLE 500 mg Intravenous Q8H Vonnie Gera [place], DO Last Rate: 500 mg (07/22/20 0309)   ondansetron 4 mg Intravenous Q6H PRN Vonnie Gera [place], DO    oxyCODONE 10 mg Oral Q4H PRN Vonnie Gera [place], DO    oxyCODONE 5 mg Oral Q4H PRN Vonnie Gera [place], DO      Continuous Infusions:  dextrose 5 % and sodium chloride 0 45 % 110 mL/hr Last Rate: 110 mL/hr (07/22/20 0208)     PRN Meds:   acetaminophen    HYDROmorphone    ondansetron    oxyCODONE    oxyCODONE      Lab, Imaging and other studies:I have personally reviewed pertinent lab results      VTE Pharmacologic Prophylaxis: Heparin  VTE Mechanical Prophylaxis: sequential compression device

## 2020-07-22 NOTE — PLAN OF CARE
Problem: PAIN - ADULT  Goal: Verbalizes/displays adequate comfort level or baseline comfort level  Description  Interventions:  - Encourage patient to monitor pain and request assistance  - Assess pain using appropriate pain scale  - Administer analgesics based on type and severity of pain and evaluate response  - Implement non-pharmacological measures as appropriate and evaluate response  - Consider cultural and social influences on pain and pain management  - Notify physician/advanced practitioner if interventions unsuccessful or patient reports new pain  Outcome: Progressing     Problem: INFECTION - ADULT  Goal: Absence or prevention of progression during hospitalization  Description  INTERVENTIONS:  - Assess and monitor for signs and symptoms of infection  - Monitor lab/diagnostic results  - Monitor all insertion sites, i e  indwelling lines, tubes, and drains  - Monitor endotracheal if appropriate and nasal secretions for changes in amount and color  - [place] appropriate cooling/warming therapies per order  - Administer medications as ordered  - Instruct and encourage patient and family to use good hand hygiene technique  - Identify and instruct in appropriate isolation precautions for identified infection/condition  Outcome: Progressing  Goal: Absence of fever/infection during neutropenic period  Description  INTERVENTIONS:  - Monitor WBC    Outcome: Progressing     Problem: SAFETY ADULT  Goal: Patient will remain free of falls  Description  INTERVENTIONS:  - Assess patient frequently for physical needs  -  Identify cognitive and physical deficits and behaviors that affect risk of falls    -  [place] fall precautions as indicated by assessment   - Educate patient/family on patient safety including physical limitations  - Instruct patient to call for assistance with activity based on assessment  - Modify environment to reduce risk of injury  - Consider OT/PT consult to assist with strengthening/mobility  Outcome: Progressing  Goal: Maintain or return to baseline ADL function  Description  INTERVENTIONS:  -  Assess patient's ability to carry out ADLs; assess patient's baseline for ADL function and identify physical deficits which impact ability to perform ADLs (bathing, care of mouth/teeth, toileting, grooming, dressing, etc )  - Assess/evaluate cause of self-care deficits   - Assess range of motion  - Assess patient's mobility; develop plan if impaired  - Assess patient's need for assistive devices and provide as appropriate  - Encourage maximum independence but intervene and supervise when necessary  - Involve family in performance of ADLs  - Assess for home care needs following discharge   - Consider OT consult to assist with ADL evaluation and planning for discharge  - Provide patient education as appropriate  Outcome: Progressing  Goal: Maintain or return mobility status to optimal level  Description  INTERVENTIONS:  - Assess patient's baseline mobility status (ambulation, transfers, stairs, etc )    - Identify cognitive and physical deficits and behaviors that affect mobility  - Identify mobility aids required to assist with transfers and/or ambulation (gait belt, sit-to-stand, lift, walker, cane, etc )  - [place] fall precautions as indicated by assessment  - Record patient progress and toleration of activity level on Mobility SBAR; progress patient to next Phase/Stage  - Instruct patient to call for assistance with activity based on assessment  - Consider rehabilitation consult to assist with strengthening/weightbearing, etc   Outcome: Progressing

## 2020-07-22 NOTE — SOCIAL WORK
Met with pt, explained CM program/CM role  LOS-1  Bundle-no  Unplanned readmission color-green  27 Day readmission-no  Resides with: and children  Type home:2   LUCHO:2  Bedroom located:2nd floor  Primary caregiver:self  ADL's/ambulation:I PTA  Drive-yes  PCP-MAITE Walker  Pharmacy-[facility] medication-yes  HHC:no  DME:nebulizer  IP Rehab:no  MH illness- no                 IP/OP care-no  Drug abuse:no  Alcohol abuse:no  Employed-no  Primary Contact- [phone]  POA:no  LW: no  Transportation home:    CM reviewed d/c planning process including the following: identifying help at home, patient preference for d/c planning needs, Discharge Lounge, [facility] Meds to Bed program, availability of treatment team to discuss questions or concerns patient and/or family may have regarding understanding medications and recognizing signs and symptoms once discharged  CM also encouraged patient to follow up with all recommended appointments after discharge  Patient advised of importance for patient and family to participate in managing patients medical well being  Patient/caregiver received discharge checklist  Content reviewed  Patient/caregiver encouraged to participate in discharge plan of care prior to discharge home

## 2020-07-22 NOTE — H&P
H&P Exam - General Surgery   Melvina Joiner 26 y o  female MRN: [id number]  Unit/Bed#: [facility] 923-01 Encounter: [id number]    Assessment/Plan     Assessment:  25 yo female w/ choledocholithiasis      VSS  AF  WBC of 5 5  L AST//402  Tbili 1 64  RUQ US:Dilated CBD  Trace pericholecystic fluid is noted  Gallbladder is with small stones seen within the lumen  Constellation of findings suggests acute cholecystitis however the gallbladder wall is normal; recommend clinical correlation  Plan:  NPO  Consult GI: MRCP vs ERCP  Ancef/Flagyl  Trend LFTs/bili      History of Present Illness     HPI:  Melvina Joiner is a 26 y o  female who presents with two days of RUQ abd pain  Reports the pain as a colicky pain that comes and goes  She reports similar episodes in the past especially after eating greasy foods  She reports nausea and one episode of emesis  Not taken anything to relieve pain  Denies any subjective fevers or chills  Denies diarrhea or constipation       Review of Systems   Constitutional: Negative for chills and fever  HENT: Negative for congestion  Respiratory: Negative for shortness of breath  Cardiovascular: Negative for chest pain  Gastrointestinal: Positive for abdominal pain, nausea and vomiting  Negative for constipation and diarrhea  Genitourinary: Negative for difficulty urinating and vaginal bleeding  Neurological: Negative for headaches  Historical Information   Past Medical History:   Diagnosis Date    Asthma      History reviewed  No pertinent surgical history  Previous lap gastric sleeve 2019   4 prior c-sections  Social History   Social History     Substance and Sexual Activity   Alcohol Use Yes    Comment: socially     Social History     Substance and Sexual Activity   Drug Use No     Social History     Tobacco Use   Smoking Status Never Smoker   Smokeless Tobacco Never Used     E-Cigarette/Vaping    E-Cigarette Use Never User      E-Cigarette/Vaping Substances Family History: History reviewed  No pertinent family history  Meds/Allergies   all medications and allergies reviewed  No Known Allergies    Objective   First Vitals:   Blood Pressure: 123/66 (07/21/20 2141)  Pulse: 75 (07/21/20 2141)  Temperature: 98 3 °F (36 8 °C) (07/22/20 0137)  Temp Source: Oral (07/22/20 0137)  Respirations: 20 (07/21/20 2141)  Height: 5' 3" (160 cm) (07/22/20 0143)  Weight - Scale: 69 4 kg (153 lb) (07/21/20 2141)  SpO2: 100 % (07/21/20 2141)    Current Vitals:   Blood Pressure: 112/64 (07/22/20 0137)  Pulse: 75 (07/22/20 0137)  Temperature: 98 3 °F (36 8 °C) (07/22/20 0137)  Temp Source: Oral (07/22/20 0137)  Respirations: 18 (07/22/20 0137)  Height: 5' 3" (160 cm) (07/22/20 0143)  Weight - Scale: 69 8 kg (153 lb 14 1 oz) (07/22/20 0143)  SpO2: 100 % (07/22/20 0015)    No intake or output data in the 24 hours ending 07/22/20 0148    Invasive Devices     Peripheral Intravenous Line            Peripheral IV 07/21/20 Left Antecubital less than 1 day                Physical Exam   Constitutional: She is oriented to person, place, and time  She appears well-developed  No distress  HENT:   Head: Normocephalic and atraumatic  Eyes: Conjunctivae are normal    Cardiovascular: Normal rate and regular rhythm  Pulmonary/Chest: Effort normal and breath sounds normal    Abdominal: Soft  She exhibits no distension  There is tenderness (RUQ)  There is no rebound and no guarding  Neurological: She is alert and oriented to person, place, and time  Skin: Skin is warm  Capillary refill takes less than 2 seconds         Lab Results:   CBC:   Lab Results   Component Value Date    WBC 4 11 (L) 07/21/2020    HGB 8 0 (L) 07/21/2020    HCT 29 3 (L) 07/21/2020    MCV 76 (L) 07/21/2020     07/21/2020    MCH 20 7 (L) 07/21/2020    MCHC 27 3 (L) 07/21/2020    RDW 15 7 (H) 07/21/2020    MPV 11 3 07/21/2020    NRBC 0 07/21/2020   , CMP:   Lab Results   Component Value Date    SODIUM 130 (L) 07/21/2020    K 3 7 07/21/2020     (H) 07/21/2020    CO2 23 07/21/2020    BUN 14 07/21/2020    CREATININE 0 49 (L) 07/21/2020    CALCIUM 8 7 07/21/2020     (H) 07/21/2020     (H) 07/21/2020    ALKPHOS 135 (H) 07/21/2020    EGFR 131 07/21/2020     Imaging: I have personally reviewed pertinent reports  EKG, Pathology, and Other Studies: I have personally reviewed pertinent reports        Code Status: Level 1 - Full Code  Advance Directive and Living Will:      Power of :    POLST:

## 2020-07-22 NOTE — UTILIZATION REVIEW
Initial Clinical Review  Arrived 7/21, IP order entered 7/22    Admission: Date/Time/Statement:   Admission Orders (From admission, onward)     Ordered        07/22/20 0042  Inpatient Admission  Once                   Orders Placed This Encounter   Procedures    Inpatient Admission     Standing Status:   Standing     Number of Occurrences:   1     Order Specific Question:   Admitting Physician     Answer:   Meseret Bustillo [[id number]]     Order Specific Question:   Level of Care     Answer:   Med Surg [[id number]]     Order Specific Question:   Estimated length of stay     Answer:   More than 2 Midnights     Order Specific Question:   Certification     Answer:   I certify that inpatient services are medically necessary for this patient for a duration of greater than two midnights  See H&P and MD Progress Notes for additional information about the patient's course of treatment  ED Arrival Information     Expected Arrival Acuity Means of Arrival Escorted By Service Admission Type    - 7/21/2020 20:55 Urgent Walk-In Self Surgery-General Urgent    Arrival Complaint    abd pain        Chief Complaint   Patient presents with    Abdominal Pain     Pt stated that she began with right sided abdominal pain/flank pain which radiates into back   + nausea/vomiting, increased urinary frequency - diarrhea     Assessment/Plan: 25 yo fem w/hx asthma and anemia from heavy menses to ED from home admitted as inpatient on the surgical service due to choledocholithiasis  Presented with 2 days of intermittent colicky RUQ abd pain that feels similar to prior episodes, especially after greasy foods  C/o nausea with vomiting x 1  Was supposed to schedule lap salina in the past; however, had to be delayed due to anemia and the pandemic  Has tried adjusting diet to control sx but returned and became constant prompting this admission  Exam reveals RUQ abd tenderness   Imaging shows acute cholecystitis based on findings, but gallbladder normal  Keeping NPO, GI consulted for MRCP vs  ERCP  Double IV antbx and serial labs in progress  Medicine is also following      7/22: suspect sx are due to CBD stone, hepatitis, or obstructing malignancy, does have cholecystitis based on positive Pascal's sign  [id number] for clears, NPO after midnight, MRCP to be done tomorrow, then decision to be made on when to perform lap salina  continues with abd pain, controlled by meds  No nausea  Continue IVF, analgesics, antiemetics, trending LFT's       7/23: hypotensive last night  keeping NPO with IVF  Pain improving  MRCP showed dilated CBD but no stones  Does have gallstones with fluid, which could be from acute cholecystitis or hepatic parenchymal dz/hepatitis  GI citing no indication for ERCP as the stone most likely passed and LFT's are downtrending  hgb is now dropping at 7 1  Continue to monitor hgb  If drops further, EGD can be done tomorrow  Continues on double IV antbx  Hypokalemic with IV replacement ordered       ED Triage Vitals   Temperature Pulse Respirations Blood Pressure SpO2   07/22/20 0137 07/21/20 2141 07/21/20 2141 07/21/20 2141 07/21/20 2141   98 3 °F (36 8 °C) 75 20 123/66 100 %      Temp Source Heart Rate Source Patient Position - Orthostatic VS BP Location FiO2 (%)   07/22/20 0137 07/21/20 2312 07/21/20 2312 07/21/20 2312 --   Oral Monitor Lying Right arm       Pain Score       07/21/20 2141       Worst Possible Pain          Wt Readings from Last 1 Encounters:   07/22/20 69 8 kg (153 lb 14 1 oz)     Additional Vital Signs:   Date/Time  Temp  Pulse  Resp  BP  MAP (mmHg)  SpO2     07/23/20 08:33:02  98 4 °F (36 9 °C)  67  16  92/60  71  100 %     07/22/20 2229  98 3 °F (36 8 °C)  66  18  98/69    100 %     07/22/20 22:18:49        90/60  70       07/22/20 22:17:45        90/59  69       07/22/20 22:11:01  98 8 °F (37 1 °C)    17  88/60Abnormal   69       07/22/20 1500        100/60         07/22/20 14:54:31  98 8 °F (37 1 °C)  60  17  88/64Abnormal 72  100 %         Date/Time  Temp  Pulse  Resp  BP   SpO2     07/22/20 0758        94/58        07/22/20 07:08:40  98 7 °F (37 1 °C)  56  16  89/66Abnormal    100 %     07/22/20 01:37:29  98 3 °F (36 8 °C)    18  112/64        07/22/20 0137  98 3 °F (36 8 °C)  75  18  112/64        07/22/20 0015    64  25Abnormal   107/63   100 %         Pertinent Labs/Diagnostic Test Results:   7/21 EKG: nsr, sinus arrhythmia      7/21 US abd: Dilated CBD  Trace pericholecystic fluid is noted  Gallbladder is with small stones seen within the lumen  Constellation of findings suggests acute cholecystitis however the gallbladder wall is normal;    Echogenic foci within the wall of the gallbladder likely artifact compatible with adenomyomatosis    Starry jian appearance of the liver suggests hepatitis    Consider MRI/MRCP if indicated clinically  7/23: MRCP:Dilated common bile duct however there is no evidence of choledocholithiasis      Cholelithiasis with pericholecystic fluid  This may be due to acute cholecystitis or related to hepatic parenchymal disease/hepatitis        Results from last 7 days   Lab Units 07/23/20  0630 07/22/20  0524 07/21/20  2219   WBC Thousand/uL 3 83* 3 60* 4 11*   HEMOGLOBIN g/dL 7 1* 7 5* 8 0*   HEMATOCRIT % 26 1* 26 8* 29 3*   PLATELETS Thousands/uL 251 251 267   NEUTROS ABS Thousands/µL 1 76*  --  3 02         Results from last 7 days   Lab Units 07/23/20  0805 07/22/20  0524 07/21/20  2219   SODIUM mmol/L 140 137 130*   POTASSIUM mmol/L 3 0* 3 7 3 7   CHLORIDE mmol/L 109* 110* 112*   CO2 mmol/L 24 21 23   ANION GAP mmol/L 7 6 -5*   BUN mg/dL 5 11 14   CREATININE mg/dL 0 49* 0 43* 0 49*   EGFR ml/min/1 73sq m 131 137 131   CALCIUM mg/dL 8 4 9 1 8 7     Results from last 7 days   Lab Units 07/23/20  0805 07/22/20  0524 07/21/20  2219   AST U/L 182* 560* 876*   ALT U/L 221* 381* 402*   ALK PHOS U/L 118* 141* 135*   TOTAL PROTEIN g/dL 6 1* 6 7 7 4   ALBUMIN g/dL 2 9* 3 1* 3 6   TOTAL BILIRUBIN mg/dL 0 45 2 25* 1 64*   BILIRUBIN DIRECT mg/dL  --  1 46*  --          Results from last 7 days   Lab Units 07/23/20  0805 07/22/20  0524 07/21/20  2219   GLUCOSE RANDOM mg/dL 94 78 97       Results from last 7 days   Lab Units 07/21/20  2219   LIPASE u/L 168     Lab Units 07/21/20  2153   CLARITY UA  Turbid   COLOR UA  [place]Kean   SPEC GRAV UA  >=1 030   PH UA  5 0   GLUCOSE UA mg/dl 100 (1/10%)*   KETONES UA mg/dl Negative   BLOOD UA  Negative   PROTEIN UA mg/dl 30 (1+)*   NITRITE UA  Negative   BILIRUBIN UA  Interference- unable to analyze*   UROBILINOGEN UA E U /dl >=8 0*   LEUKOCYTES UA  Negative   WBC UA /hpf 4-10*   RBC UA /hpf None Seen   BACTERIA UA /hpf Moderate*   EPITHELIAL CELLS WET PREP /hpf Occasional   MUCUS THREADS  Moderate*   ED Treatment:   Medication Administration from 07/21/2020 2055 to 07/22/2020 0142       Date/Time Order Dose Route Action     07/21/2020 2310 HYDROmorphone (DILAUDID) injection 0 5 mg 0 5 mg Intravenous Given     07/21/2020 2313 ondansetron (ZOFRAN) injection 4 mg 4 mg Intravenous Given        Past Medical History:   Diagnosis Date    Asthma        Admitting Diagnosis: Choledocholithiasis [K80 50]  Abdominal pain [R10 9]  Age/Sex: 26 y o  female  Admission Orders:  Scheduled Medications:    Medications:  cefazolin 2,000 mg Intravenous Q8H   heparin (porcine) 5,000 Units Subcutaneous Q8H [facility]   metroNIDAZOLE 500 mg Intravenous Q8H   potassium chloride 20 mEq Intravenous BID   potassium chloride 20 mEq Intravenous Q2H     Continuous IV Infusions:  dextrose 5 % and sodium chloride 0 45 % 110 mL/hr Intravenous Continuous     PRN Meds:  acetaminophen 650 mg Oral Q6H PRN   HYDROmorphone 0 5 mg Intravenous Q4H PRN   ondansetron 4 mg Intravenous Q6H PRN   oxyCODONE 10 mg Oral Q4H PRN   oxyCODONE  X 1 7/22 5 mg Oral Q4H PRN     SCD's  MRCP  Cbc, cmp in am  Clear liquids, NPO at midnight 7/23      IP CONSULT TO GASTROENTEROLOGY    Network Utilization Review Department  [email or web address]  ATTENTION: Please call with any questions or concerns to [phone] and carefully listen to the prompts so that you are directed to the right person  All voicemails are confidential   Alcides Cedillo all requests for admission clinical reviews, approved or denied determinations and any other requests to dedicated fax number below belonging to the campus where the patient is receiving treatment   List of dedicated fax numbers for the Facilities:  [address] DENIALS (Administrative/Medical Necessity) [phone]   [address] (Maternity/NICU/Pediatrics) [phone]   [facility] [phone]   [facility] [phone]   Taras Cueto [phone]   Yoshi Goldman [phone]   [address] [place] [address] [address] [place] [address] [phone]   [facility]  [phone]   [address] [facility] [address]  [address] [facility] [address] [facility] [phone]

## 2020-07-22 NOTE — PLAN OF CARE
Problem: PAIN - ADULT  Goal: Verbalizes/displays adequate comfort level or baseline comfort level  Description  Interventions:  - Encourage patient to monitor pain and request assistance  - Assess pain using appropriate pain scale  - Administer analgesics based on type and severity of pain and evaluate response  - Implement non-pharmacological measures as appropriate and evaluate response  - Consider cultural and social influences on pain and pain management  - Notify physician/advanced practitioner if interventions unsuccessful or patient reports new pain  Outcome: Progressing     Problem: INFECTION - ADULT  Goal: Absence or prevention of progression during hospitalization  Description  INTERVENTIONS:  - Assess and monitor for signs and symptoms of infection  - Monitor lab/diagnostic results  - Monitor all insertion sites, i e  indwelling lines, tubes, and drains  - Monitor endotracheal if appropriate and nasal secretions for changes in amount and color  - Emeigh appropriate cooling/warming therapies per order  - Administer medications as ordered  - Instruct and encourage patient and family to use good hand hygiene technique  - Identify and instruct in appropriate isolation precautions for identified infection/condition  Outcome: Progressing  Goal: Absence of fever/infection during neutropenic period  Description  INTERVENTIONS:  - Monitor WBC    Outcome: Progressing     Problem: SAFETY ADULT  Goal: Patient will remain free of falls  Description  INTERVENTIONS:  - Assess patient frequently for physical needs  -  Identify cognitive and physical deficits and behaviors that affect risk of falls    -  [place] fall precautions as indicated by assessment   - Educate patient/family on patient safety including physical limitations  - Instruct patient to call for assistance with activity based on assessment  - Modify environment to reduce risk of injury  - Consider OT/PT consult to assist with strengthening/mobility  Outcome: Progressing  Goal: Maintain or return to baseline ADL function  Description  INTERVENTIONS:  -  Assess patient's ability to carry out ADLs; assess patient's baseline for ADL function and identify physical deficits which impact ability to perform ADLs (bathing, care of mouth/teeth, toileting, grooming, dressing, etc )  - Assess/evaluate cause of self-care deficits   - Assess range of motion  - Assess patient's mobility; develop plan if impaired  - Assess patient's need for assistive devices and provide as appropriate  - Encourage maximum independence but intervene and supervise when necessary  - Involve family in performance of ADLs  - Assess for home care needs following discharge   - Consider OT consult to assist with ADL evaluation and planning for discharge  - Provide patient education as appropriate  Outcome: Progressing  Goal: Maintain or return mobility status to optimal level  Description  INTERVENTIONS:  - Assess patient's baseline mobility status (ambulation, transfers, stairs, etc )    - Identify cognitive and physical deficits and behaviors that affect mobility  - Identify mobility aids required to assist with transfers and/or ambulation (gait belt, sit-to-stand, lift, walker, cane, etc )  - [place] fall precautions as indicated by assessment  - Record patient progress and toleration of activity level on Mobility SBAR; progress patient to next Phase/Stage  - Instruct patient to call for assistance with activity based on assessment  - Consider rehabilitation consult to assist with strengthening/weightbearing, etc   Outcome: Progressing

## 2020-07-22 NOTE — ED ATTENDING ATTESTATION
7/21/2020  I, Carlyn James MD, saw and evaluated the patient  I have discussed the patient with the resident/non-physician practitioner and agree with the resident's/non-physician practitioner's findings, Plan of Care, and MDM as documented in the resident's/non-physician practitioner's note, except where noted  All available labs and Radiology studies were reviewed  I was present for key portions of any procedure(s) performed by the resident/non-physician practitioner and I was immediately available to provide assistance  At this point I agree with the current assessment done in the Emergency Department  I have conducted an independent evaluation of this patient a history and physical is as follows: Pt presents with ruq abd pain for 3 days  Decreased appetite Today worse at 2 pm and constant Pain is worse with movement and deep breath  PE: alert nad heart reg lungs clear abd soft tender ruq MDM: will check us and labs  ED Course         Critical Care Time  Procedures

## 2020-07-22 NOTE — CONSULTS
Consultation - Sandra Powell 26 y o  female MRN: [id number]  Unit/Bed#: [facility] 923-01 Encounter: [id number]      Assessment/Plan     The patient is a 26year-old female with a PMHx of asthma that presents with RUQ pain for 3 days, and is being consulted for investigation of possible choledocholithiasis  # RUQ  # Nausea  # Dilated CBD  The patient reported to [facility] yesterday after multiple hours of constant RUQ and nausea  In the days leading up to her presentation, she was experiencing intermittent RUQ pain that was mostly associated with eating fatty/greasy foods  She attempted to eliminate those foods from her diet, experienced some relief, but the pain became constant and intense yesterday afternoon  She had this pain before about 6 months ago and was evaluated for acute cholecystitis with a RUQ US and CT  She was going to schedule a cholecystectomy, but was found to be iron deficient secondary to her heavy menstrual cycles, for which she endorses some associated lightheadedness  Since, she has not been able to schedule the procedure for personal reasons and the current pandemic  In the ED, she was found to have , , Alk Phos 135, TBili 1 64, WBC 4 11, normal lipase, Hgb 8 (basline if 9 1 in 2/2020) with MCV 76, and an RUQ US that showed a dilated CBD and stones in the GB  She had one episode of non bloody emesis in the ED  She denies the use of hepatotoxic drugs  She has no family history of GI malignancy  She was started on metronidazole and cefazolin  Since admission, the patient stated that her RUQ pain has been controlled  It is down from a 10/10 to a 5/10  The pain still radiates to her back, but she is no longer nauseous  She endorses an appetite  She denies dysphagia, vomiting, diarrhea, hematochezia, and hematuria  Her last menstrual cycle was in the beginning of July   Labs are currently , , Alk phos 141, TBili 2 25, DBili 1 46, WBC 3 6, and Hgb 7 5 with MCV 76     DDx: choledocholithiasis (suspected), primary sclerosing cholangitis, Mirizzi syndrome, sphincter of Oddi dysfunction, reflux, cholangiocarcinoma, pancreatic tumor        Plan:  -Clear liquids, progress diet as tolerated  -NPO after midnight  -Antibiotics may be discontinued  -MRCP today to evaluate biliary tree, possible intervention tomorrow based on results  -Trend LFTs        History of Present Illness   Physician Requesting Consult: Alessia Trejo MD  Reason for Consult / Principal Problem: Possible Choledocholithiasis  Hx and PE limited by: None  HPI: Noemy Roberto is a 26y o  year old female who presents with RUQ pain and nausea of 3 days during, as explained above  The patient was seen and examined at the bedside  There were no acute events overnight  She was in no acute distress and sitting up comfortably in bed  Her pain is controlled  She will be evaluated for choledocholithiasis today with a MRCP  Inpatient consult to gastroenterology     Performed by  Maribell Piedra DO     Authorized by Lalitha Araujo DO              Review of Systems   Constitutional: Negative for activity change, appetite change, chills, fatigue and fever  HENT: Negative for congestion, ear pain, rhinorrhea and trouble swallowing  Eyes: Negative for pain  Respiratory: Negative for apnea and chest tightness  Cardiovascular: Negative for chest pain and leg swelling  Gastrointestinal: Positive for abdominal pain  Negative for abdominal distention, blood in stool, constipation, diarrhea, nausea and vomiting  Genitourinary: Negative for difficulty urinating and dysuria  Musculoskeletal: Negative for arthralgias and myalgias  Neurological: Negative for dizziness and light-headedness  Psychiatric/Behavioral: Negative for agitation, behavioral problems and confusion  Historical Information   Past Medical History:   Diagnosis Date    Asthma      History reviewed   No pertinent surgical history  Social History   Social History     Substance and Sexual Activity   Alcohol Use Yes    Comment: socially     Social History     Substance and Sexual Activity   Drug Use No     E-Cigarette/Vaping    E-Cigarette Use Never User      E-Cigarette/Vaping Substances     Social History     Tobacco Use   Smoking Status Never Smoker   Smokeless Tobacco Never Used     Family History: History reviewed  No pertinent family history  Meds/Allergies   current meds:   Current Facility-Administered Medications   Medication Dose Route Frequency    acetaminophen (TYLENOL) tablet 650 mg  650 mg Oral Q6H PRN    ceFAZolin (ANCEF) IVPB (premix) 2,000 mg 50 mL  2,000 mg Intravenous Q8H    dextrose 5 % and sodium chloride 0 45 % infusion  110 mL/hr Intravenous Continuous    heparin (porcine) subcutaneous injection 5,000 Units  5,000 Units Subcutaneous Q8H [facility]    HYDROmorphone (DILAUDID) injection 0 5 mg  0 5 mg Intravenous Q4H PRN    metroNIDAZOLE (FLAGYL) IVPB (premix) 500 mg 100 mL  500 mg Intravenous Q8H    ondansetron (ZOFRAN) injection 4 mg  4 mg Intravenous Q6H PRN    oxyCODONE (ROXICODONE) immediate release tablet 10 mg  10 mg Oral Q4H PRN    oxyCODONE (ROXICODONE) IR tablet 5 mg  5 mg Oral Q4H PRN       No Known Allergies    Objective       Intake/Output Summary (Last 24 hours) at 7/22/2020 0924  Last data filed at 7/22/2020 0525  Gross per 24 hour   Intake 0 ml   Output 0 ml   Net 0 ml       Invasive Devices:   Peripheral IV 07/21/20 Left Antecubital (Active)   Site Assessment Clean;Dry; Intact 7/22/2020  2:00 AM   Dressing Type Transparent 7/22/2020  2:00 AM   Line Status Flushed; Infusing 7/22/2020  2:00 AM   Dressing Status Clean;Dry; Intact 7/22/2020  2:00 AM   Dressing Change Due 07/25/20 7/22/2020  2:00 AM   Reason Not Rotated Not due 7/22/2020  2:00 AM       Physical Exam   Constitutional: She is oriented to person, place, and time  She appears well-developed and well-nourished  No distress     HENT: Head: Normocephalic and atraumatic  Mouth/Throat: Oropharynx is clear and moist  No oropharyngeal exudate  Eyes: Pupils are equal, round, and reactive to light  EOM are normal    Neck: Normal range of motion  Neck supple  No JVD present  Cardiovascular: Normal rate, regular rhythm and normal heart sounds  Pulmonary/Chest: Effort normal and breath sounds normal    Abdominal: Soft  Bowel sounds are normal  She exhibits no distension and no mass  There is tenderness  There is no guarding  Positive Pascal's sign   Musculoskeletal: She exhibits no edema or deformity  Neurological: She is alert and oriented to person, place, and time  Skin: Skin is warm and dry  Psychiatric: She has a normal mood and affect  Her behavior is normal    Vitals reviewed  Lab Results: I have personally reviewed pertinent reports  Imaging Studies: I have personally reviewed pertinent reports  EKG, Pathology, and Other Studies: I have personally reviewed pertinent reports  VTE Prophylaxis: Sequential compression device (Venodyne)     Counseling / Coordination of Care  Total floor / unit time spent today 40 minutes  Greater than 50% of total time was spent with the patient and / or family counseling and / or coordination of care   A description of the counseling / coordination of care: [facility]    HAROLDO Zapata , PGY 1 Internal Medicine  GI Service

## 2020-07-22 NOTE — QUICK NOTE
Nurse-Patient-Provider rounds were completed with the patient's nurse today,  We discussed the plan is to GI consult with ERCP, and then probable Lap Frances tomorrow  We reviewed all of the invasive devices/lines/telemetry orders  - peripheral IV    DVT Prophylaxis:  venodynes  Sq heparin    Pain Assessment / Plan:  - Continue current analgesic regimen  Mobility Assessment / Plan:  - Activity as tolerated  Goals / Barriers for discharge:  Pain control and and procedures  Case management following; case and discharge needs discussed  All questions and concerns were addressed  I spent greater than 10 minutes reviewing the plan with the patient and the nurse, and coordinating care for the day      NATE Moreno  7/22/2020

## 2020-07-22 NOTE — ED PROVIDER NOTES
History  Chief Complaint   Patient presents with    Abdominal Pain     Pt stated that she began with right sided abdominal pain/flank pain which radiates into back   + nausea/vomiting, increased urinary frequency - diarrhea     HPI  Patient is a 26-year-old female history of asthma presenting for evaluation of right upper quadrant pain with radiation to the back  Patient states that the symptoms started about 3 days ago, previously lasted for about an hour or 2 and resolved, states that there associated but nausea but no vomiting, diminished appetite  Patient denies exacerbating or alleviating factors, denies postprandial component  Patient states that at about 1400 today the pain started and has been constant since that time, 10/10, sharp, radiation to the back, states that she had a single episode of nonbloody nonbilious emesis, note denies nausea  Patient denies fevers, chills, pain anywhere else in her abdomen, states that she has had increasing urinary frequency today, denies burning, hematuria, history of nephrolithiasis  Patient states that she had a similar episode to this about a year ago, with holding had some limited with her gallbladder and that she would need an elective surgery which she states she had previously attempted to schedule but never followed up on it  Prior to Admission Medications   Prescriptions Last Dose Informant Patient Reported? Taking?   acetaminophen (TYLENOL) 500 mg tablet 7/22/2020 at Unknown time Self Yes Yes   Sig: Take 500 mg by mouth every 6 (six) hours as needed for mild pain (when having abdominal pain flare up)   albuterol (PROVENTIL HFA,VENTOLIN HFA) 90 mcg/act inhaler Past Week at Unknown time Self Yes Yes   Sig: Inhale 2 puffs every 6 (six) hours as needed for shortness of breath      Facility-Administered Medications: None       Past Medical History:   Diagnosis Date    Asthma        History reviewed  No pertinent surgical history  History reviewed   No pertinent family history  I have reviewed and agree with the history as documented  E-Cigarette/Vaping    E-Cigarette Use Never User      E-Cigarette/Vaping Substances     Social History     Tobacco Use    Smoking status: Never Smoker    Smokeless tobacco: Never Used   Substance Use Topics    Alcohol use: Yes     Comment: socially    Drug use: No        Review of Systems   Constitutional: Positive for appetite change  Negative for activity change, chills, diaphoresis, fatigue and fever  HENT: Negative for hearing loss  Eyes: Negative for visual disturbance  Respiratory: Negative for cough, chest tightness and shortness of breath  Cardiovascular: Negative for chest pain  Gastrointestinal: Positive for abdominal pain, nausea and vomiting  Negative for abdominal distention, anal bleeding, blood in stool, constipation, diarrhea and rectal pain  Endocrine: Negative for polydipsia and polyuria  Genitourinary: Negative for dysuria and hematuria  Musculoskeletal: Negative for arthralgias and myalgias  Skin: Negative for color change and rash  Neurological: Negative for dizziness and headaches  Psychiatric/Behavioral: Negative for confusion  Physical Exam  ED Triage Vitals   Temperature Pulse Respirations Blood Pressure SpO2   07/22/20 0137 07/21/20 2141 07/21/20 2141 07/21/20 2141 07/21/20 2141   98 3 °F (36 8 °C) 75 20 123/66 100 %      Temp Source Heart Rate Source Patient Position - Orthostatic VS BP Location FiO2 (%)   07/22/20 0137 07/21/20 2312 07/21/20 2312 07/21/20 2312 --   Oral Monitor Lying Right arm       Pain Score       07/21/20 2141       Worst Possible Pain             Orthostatic Vital Signs  Vitals:    07/21/20 2312 07/22/20 0015 07/22/20 0137 07/22/20 0137   BP: 115/69 107/63 112/64 112/64   Pulse: 73 64 75    Patient Position - Orthostatic VS: Lying Lying         Physical Exam   Constitutional: She is oriented to person, place, and time   She appears well-developed and well-nourished  No distress  Uncomfortable appearing but nondistressed   HENT:   Head: Normocephalic and atraumatic  Right Ear: External ear normal    Left Ear: External ear normal    Nose: Nose normal    Mouth/Throat: Oropharynx is clear and moist  No oropharyngeal exudate  Eyes: Pupils are equal, round, and reactive to light  Neck: Normal range of motion  Cardiovascular: Normal rate, regular rhythm and normal heart sounds  Exam reveals no gallop and no friction rub  No murmur heard  Pulmonary/Chest: Effort normal and breath sounds normal  No respiratory distress  She has no wheezes  She exhibits no tenderness  Abdominal: Soft  Bowel sounds are normal  She exhibits no distension and no mass  There is tenderness  There is no guarding  Moderate right upper quadrant tenderness with voluntary guarding, positive Pascal sign, epigastric tenderness, no lower abdominal tenderness, no suprapubic tenderness  No CVA tenderness   Musculoskeletal: Normal range of motion  She exhibits no edema or deformity  Lymphadenopathy:     She has no cervical adenopathy  Neurological: She is alert and oriented to person, place, and time  Skin: Skin is warm and dry  Capillary refill takes less than 2 seconds  She is not diaphoretic  Psychiatric: She has a normal mood and affect  Vitals reviewed        ED Medications  Medications   dextrose 5 % and sodium chloride 0 45 % infusion (110 mL/hr Intravenous New Bag 7/22/20 0208)   ondansetron (ZOFRAN) injection 4 mg (has no administration in time range)   heparin (porcine) subcutaneous injection 5,000 Units (has no administration in time range)   acetaminophen (TYLENOL) tablet 650 mg (has no administration in time range)   oxyCODONE (ROXICODONE) IR tablet 5 mg (has no administration in time range)   oxyCODONE (ROXICODONE) immediate release tablet 10 mg (has no administration in time range)   ceFAZolin (ANCEF) IVPB (premix) 2,000 mg 50 mL (0 mg Intravenous Stopped 7/22/20 0238)   metroNIDAZOLE (FLAGYL) IVPB (premix) 500 mg 100 mL (500 mg Intravenous New Bag 7/22/20 0309)   HYDROmorphone (DILAUDID) injection 0 5 mg (has no administration in time range)   HYDROmorphone (DILAUDID) injection 0 5 mg (0 5 mg Intravenous Given 7/21/20 2310)   ondansetron (ZOFRAN) injection 4 mg (4 mg Intravenous Given 7/21/20 2313)       Diagnostic Studies  Results Reviewed     Procedure Component Value Units Date/Time    Urine Microscopic [[id number]]  (Abnormal) Collected:  07/21/20 2153    Lab Status:  Final result Specimen:  Urine, Clean Catch Updated:  07/21/20 2248     RBC, UA None Seen /hpf      WBC, UA 4-10 /hpf      Epithelial Cells Occasional /hpf      Bacteria, UA Moderate /hpf      MUCUS THREADS Moderate    Comprehensive metabolic panel [[id number]]  (Abnormal) Collected:  07/21/20 2219    Lab Status:  Final result Specimen:  Blood from Arm, Left Updated:  07/21/20 2247     Sodium 130 mmol/L      Potassium 3 7 mmol/L      Chloride 112 mmol/L      CO2 23 mmol/L      ANION GAP -5 mmol/L      BUN 14 mg/dL      Creatinine 0 49 mg/dL      Glucose 97 mg/dL      Calcium 8 7 mg/dL       U/L       U/L      Alkaline Phosphatase 135 U/L      Total Protein 7 4 g/dL      Albumin 3 6 g/dL      Total Bilirubin 1 64 mg/dL      eGFR 131 ml/min/1 73sq m     Narrative:       [place] guidelines for Chronic Kidney Disease (CKD):     Stage 1 with normal or high GFR (GFR > 90 mL/min/1 73 square meters)    Stage 2 Mild CKD (GFR = 60-89 mL/min/1 73 square meters)    Stage 3A Moderate CKD (GFR = 45-59 mL/min/1 73 square meters)    Stage 3B Moderate CKD (GFR = 30-44 mL/min/1 73 square meters)    Stage 4 Severe CKD (GFR = 15-29 mL/min/1 73 square meters)    Stage 5 End Stage CKD (GFR <15 mL/min/1 73 square meters)  Note: GFR calculation is accurate only with a steady state creatinine    Lipase [[id number]]  (Normal) Collected:  07/21/20 2219    Lab Status:  Final result Specimen:  Blood from Arm, Left Updated:  07/21/20 2247     Lipase 168 u/L     CBC and differential [[id number]]  (Abnormal) Collected:  07/21/20 2219    Lab Status:  Final result Specimen:  Blood from Arm, Left Updated:  07/21/20 2237     WBC 4 11 Thousand/uL      RBC 3 86 Million/uL      Hemoglobin 8 0 g/dL      Hematocrit 29 3 %      MCV 76 fL      MCH 20 7 pg      MCHC 27 3 g/dL      RDW 15 7 %      MPV 11 3 fL      Platelets 847 Thousands/uL      nRBC 0 /100 WBCs      Neutrophils Relative 74 %      Immat GRANS % 0 %      Lymphocytes Relative 18 %      Monocytes Relative 8 %      Eosinophils Relative 0 %      Basophils Relative 0 %      Neutrophils Absolute 3 02 Thousands/µL      Immature Grans Absolute 0 01 Thousand/uL      Lymphocytes Absolute 0 72 Thousands/µL      Monocytes Absolute 0 34 Thousand/µL      Eosinophils Absolute 0 01 Thousand/µL      Basophils Absolute 0 01 Thousands/µL     POCT pregnancy, urine [[id number]]  (Normal) Resulted:  07/21/20 2154    Lab Status:  Final result Specimen:  Urine Updated:  07/21/20 2155     EXT PREG TEST UR (Ref: Negative) Negative     Control Valid    Urine Macroscopic, POC [[id number]]  (Abnormal) Collected:  07/21/20 2153    Lab Status:  Final result Specimen:  Urine Updated:  07/21/20 2152     Color, UA Orange     Clarity, UA Turbid     pH, UA 5 0     Leukocytes, UA Negative     Nitrite, UA Negative     Protein, UA 30 (1+) mg/dl      Glucose,  (1/10%) mg/dl      Ketones, UA Negative mg/dl      Urobilinogen, UA >=8 0 E U /dl      Bilirubin, UA Interference- unable to analyze     Blood, UA Negative     Specific Gravity, UA >=1 030    Narrative:       CLINITEK RESULT    POCT urinalysis dipstick [[id number]]  (Normal) Resulted:  07/21/20 2152    Lab Status:  Final result Specimen:  Urine, Other Updated:  07/21/20 2152     Color, UA orange                 US abdomen limited   Final Result by Klaus Fagan MD (07/21 6085)      Dilated CBD    Trace pericholecystic fluid is noted   Gallbladder is with small stones seen within the lumen  Constellation of findings suggests acute cholecystitis however the gallbladder wall is normal; recommend clinical correlation  Echogenic foci within the wall of the gallbladder likely artifact compatible with adenomyomatosis  Starry jian appearance of the liver suggests hepatitis  Consider MRI/MRCP if indicated clinically  The study was marked in [facility] for immediate notification  Workstation performed: [id number]               Procedures  ECG 12 Lead Documentation Only  Date/Time: 7/21/2020 10:30 PM  Performed by: Chanelle Peralta MD  Authorized by: Chanelle Peralta MD     Indications / Diagnosis:  RUQ pain  ECG reviewed by me, the ED Provider: yes    Patient location:  ED  Previous ECG:     Previous ECG:  Unavailable    Comparison to cardiac monitor: Yes    Interpretation:     Interpretation: normal    Rate:     ECG rate:  69    ECG rate assessment: normal    Rhythm:     Rhythm: sinus rhythm    Ectopy:     Ectopy: none    QRS:     QRS axis:  Normal  Conduction:     Conduction: normal    ST segments:     ST segments:  Normal  T waves:     T waves: normal            ED Course  ED Course as of Jul 22 0358   Tue Jul 21, 2020   8414 Discussed patient with red surgery attending Dr Eliezer Grier, will evauate patient                                              MDM  Number of Diagnoses or Management Options  Choledocholithiasis:   Diagnosis management comments: 21-year-old female presenting with right upper quadrant pain, constant for the past 8 hours, positive Pascal sign on examination, right upper quadrant and epigastric tenderness, will check belly labs, urinalysis, urine pregnancy test, gallbladder ultrasound  Patient's LFTs bilirubin grossly elevated    Patient with radiologic ultrasound demonstrating significant dilation CBD and gross distention of gallbladder, presentation consistent choledocholithiasis, consulted Surgical attending Dr Trino Biswas, patient evaluated by surgical team and admitted to surgical service for further management  Amount and/or Complexity of Data Reviewed  Clinical lab tests: reviewed and ordered  Tests in the radiology section of CPT®: ordered and reviewed  Tests in the medicine section of CPT®: reviewed and ordered  Decide to obtain previous medical records or to obtain history from someone other than the patient: yes  Review and summarize past medical records: yes  Discuss the patient with other providers: yes  Independent visualization of images, tracings, or specimens: yes    Risk of Complications, Morbidity, and/or Mortality  Presenting problems: high  Diagnostic procedures: low  Management options: low    Patient Progress  Patient progress: stable        Disposition  Final diagnoses:   Choledocholithiasis     Time reflects when diagnosis was documented in both MDM as applicable and the Disposition within this note     Time User Action Codes Description Comment    7/22/2020 12:38 AM Todd Biswas Add [K80 50] Choledocholithiasis     7/22/2020  1:18 AM Carley Rene [K80 50] Choledocholithiasis       ED Disposition     ED Disposition Condition Date/Time Comment    Admit Stable Wed Jul 22, 2020  1:17 AM Case was discussed with red surgery and the patient's admission status was agreed to be Admission Status: inpatient status to the service of Dr Trino Biswas   Follow-up Information    None         Current Discharge Medication List      CONTINUE these medications which have NOT CHANGED    Details   acetaminophen (TYLENOL) 500 mg tablet Take 500 mg by mouth every 6 (six) hours as needed for mild pain (when having abdominal pain flare up)      albuterol (PROVENTIL HFA,VENTOLIN HFA) 90 mcg/act inhaler Inhale 2 puffs every 6 (six) hours as needed for shortness of breath    Comments: Substitution to a formulary equivalent within the same pharmaceutical class is authorized  No discharge procedures on file  PDMP Review     None           ED Provider  Attending physically available and evaluated Nikki Esteban I managed the patient along with the ED Attending      Electronically Signed by         Tila Middleton MD  07/22/20 6417

## 2020-07-23 ENCOUNTER — APPOINTMENT (INPATIENT)
Dept: RADIOLOGY | Facility: HOSPITAL | Age: 30
DRG: 284 | End: 2020-07-23
Payer: COMMERCIAL

## 2020-07-23 LAB
ABO GROUP BLD: NORMAL
ALBUMIN SERPL BCP-MCNC: 2.9 G/DL (ref 3.5–5)
ALP SERPL-CCNC: 118 U/L (ref 46–116)
ALT SERPL W P-5'-P-CCNC: 221 U/L (ref 12–78)
ANION GAP SERPL CALCULATED.3IONS-SCNC: 7 MMOL/L (ref 4–13)
AST SERPL W P-5'-P-CCNC: 182 U/L (ref 5–45)
BASOPHILS # BLD AUTO: 0.02 THOUSANDS/ΜL (ref 0–0.1)
BASOPHILS NFR BLD AUTO: 1 % (ref 0–1)
BILIRUB SERPL-MCNC: 0.45 MG/DL (ref 0.2–1)
BLD GP AB SCN SERPL QL: NEGATIVE
BUN SERPL-MCNC: 5 MG/DL (ref 5–25)
CALCIUM SERPL-MCNC: 8.4 MG/DL (ref 8.3–10.1)
CHLORIDE SERPL-SCNC: 109 MMOL/L (ref 100–108)
CO2 SERPL-SCNC: 24 MMOL/L (ref 21–32)
CREAT SERPL-MCNC: 0.49 MG/DL (ref 0.6–1.3)
EOSINOPHIL # BLD AUTO: 0.07 THOUSAND/ΜL (ref 0–0.61)
EOSINOPHIL NFR BLD AUTO: 2 % (ref 0–6)
ERYTHROCYTE [DISTWIDTH] IN BLOOD BY AUTOMATED COUNT: 15.8 % (ref 11.6–15.1)
FERRITIN SERPL-MCNC: 4 NG/ML (ref 8–388)
GFR SERPL CREATININE-BSD FRML MDRD: 131 ML/MIN/1.73SQ M
GLUCOSE SERPL-MCNC: 94 MG/DL (ref 65–140)
HCT VFR BLD AUTO: 26.1 % (ref 34.8–46.1)
HGB BLD-MCNC: 7.1 G/DL (ref 11.5–15.4)
IMM GRANULOCYTES # BLD AUTO: 0.01 THOUSAND/UL (ref 0–0.2)
IMM GRANULOCYTES NFR BLD AUTO: 0 % (ref 0–2)
IRON SATN MFR SERPL: 5 %
IRON SERPL-MCNC: 14 UG/DL (ref 50–170)
LYMPHOCYTES # BLD AUTO: 1.65 THOUSANDS/ΜL (ref 0.6–4.47)
LYMPHOCYTES NFR BLD AUTO: 43 % (ref 14–44)
MAGNESIUM SERPL-MCNC: 2.2 MG/DL (ref 1.6–2.6)
MCH RBC QN AUTO: 20.8 PG (ref 26.8–34.3)
MCHC RBC AUTO-ENTMCNC: 27.2 G/DL (ref 31.4–37.4)
MCV RBC AUTO: 76 FL (ref 82–98)
MONOCYTES # BLD AUTO: 0.32 THOUSAND/ΜL (ref 0.17–1.22)
MONOCYTES NFR BLD AUTO: 8 % (ref 4–12)
NEUTROPHILS # BLD AUTO: 1.76 THOUSANDS/ΜL (ref 1.85–7.62)
NEUTS SEG NFR BLD AUTO: 46 % (ref 43–75)
NRBC BLD AUTO-RTO: 0 /100 WBCS
PLATELET # BLD AUTO: 251 THOUSANDS/UL (ref 149–390)
PMV BLD AUTO: 11.6 FL (ref 8.9–12.7)
POTASSIUM SERPL-SCNC: 3 MMOL/L (ref 3.5–5.3)
PROT SERPL-MCNC: 6.1 G/DL (ref 6.4–8.2)
RBC # BLD AUTO: 3.42 MILLION/UL (ref 3.81–5.12)
RH BLD: POSITIVE
SARS-COV-2 RNA RESP QL NAA+PROBE: POSITIVE
SODIUM SERPL-SCNC: 140 MMOL/L (ref 136–145)
SPECIMEN EXPIRATION DATE: NORMAL
TIBC SERPL-MCNC: 269 UG/DL (ref 250–450)
WBC # BLD AUTO: 3.83 THOUSAND/UL (ref 4.31–10.16)

## 2020-07-23 PROCEDURE — 86900 BLOOD TYPING SEROLOGIC ABO: CPT | Performed by: SURGERY

## 2020-07-23 PROCEDURE — 99232 SBSQ HOSP IP/OBS MODERATE 35: CPT | Performed by: SURGERY

## 2020-07-23 PROCEDURE — 83550 IRON BINDING TEST: CPT | Performed by: STUDENT IN AN ORGANIZED HEALTH CARE EDUCATION/TRAINING PROGRAM

## 2020-07-23 PROCEDURE — 71045 X-RAY EXAM CHEST 1 VIEW: CPT

## 2020-07-23 PROCEDURE — 83540 ASSAY OF IRON: CPT | Performed by: STUDENT IN AN ORGANIZED HEALTH CARE EDUCATION/TRAINING PROGRAM

## 2020-07-23 PROCEDURE — 74181 MRI ABDOMEN W/O CONTRAST: CPT

## 2020-07-23 PROCEDURE — 86901 BLOOD TYPING SEROLOGIC RH(D): CPT | Performed by: SURGERY

## 2020-07-23 PROCEDURE — 86850 RBC ANTIBODY SCREEN: CPT | Performed by: SURGERY

## 2020-07-23 PROCEDURE — 99232 SBSQ HOSP IP/OBS MODERATE 35: CPT | Performed by: INTERNAL MEDICINE

## 2020-07-23 PROCEDURE — U0003 INFECTIOUS AGENT DETECTION BY NUCLEIC ACID (DNA OR RNA); SEVERE ACUTE RESPIRATORY SYNDROME CORONAVIRUS 2 (SARS-COV-2) (CORONAVIRUS DISEASE [COVID-19]), AMPLIFIED PROBE TECHNIQUE, MAKING USE OF HIGH THROUGHPUT TECHNOLOGIES AS DESCRIBED BY CMS-2020-01-R: HCPCS | Performed by: SURGERY

## 2020-07-23 PROCEDURE — 83735 ASSAY OF MAGNESIUM: CPT | Performed by: SURGERY

## 2020-07-23 PROCEDURE — 85025 COMPLETE CBC W/AUTO DIFF WBC: CPT | Performed by: SURGERY

## 2020-07-23 PROCEDURE — 82728 ASSAY OF FERRITIN: CPT | Performed by: STUDENT IN AN ORGANIZED HEALTH CARE EDUCATION/TRAINING PROGRAM

## 2020-07-23 PROCEDURE — 80053 COMPREHEN METABOLIC PANEL: CPT | Performed by: SURGERY

## 2020-07-23 RX ORDER — POTASSIUM CHLORIDE 14.9 MG/ML
20 INJECTION INTRAVENOUS ONCE
Status: DISCONTINUED | OUTPATIENT
Start: 2020-07-23 | End: 2020-07-24

## 2020-07-23 RX ORDER — POTASSIUM CHLORIDE 14.9 MG/ML
20 INJECTION INTRAVENOUS 2 TIMES DAILY
Status: DISCONTINUED | OUTPATIENT
Start: 2020-07-23 | End: 2020-07-23

## 2020-07-23 RX ORDER — POTASSIUM CHLORIDE 29.8 MG/ML
40 INJECTION INTRAVENOUS ONCE
Status: DISCONTINUED | OUTPATIENT
Start: 2020-07-23 | End: 2020-07-23

## 2020-07-23 RX ORDER — POTASSIUM CHLORIDE 14.9 MG/ML
20 INJECTION INTRAVENOUS
Status: COMPLETED | OUTPATIENT
Start: 2020-07-23 | End: 2020-07-23

## 2020-07-23 RX ORDER — POTASSIUM CHLORIDE 14.9 MG/ML
20 INJECTION INTRAVENOUS
Status: COMPLETED | OUTPATIENT
Start: 2020-07-23 | End: 2020-07-24

## 2020-07-23 RX ORDER — POTASSIUM CHLORIDE 14.9 MG/ML
20 INJECTION INTRAVENOUS
Status: DISCONTINUED | OUTPATIENT
Start: 2020-07-23 | End: 2020-07-23

## 2020-07-23 RX ADMIN — CEFAZOLIN SODIUM 2000 MG: 2 SOLUTION INTRAVENOUS at 10:08

## 2020-07-23 RX ADMIN — HEPARIN SODIUM 5000 UNITS: 5000 INJECTION INTRAVENOUS; SUBCUTANEOUS at 21:02

## 2020-07-23 RX ADMIN — POTASSIUM CHLORIDE 20 MEQ: 14.9 INJECTION, SOLUTION INTRAVENOUS at 17:29

## 2020-07-23 RX ADMIN — POTASSIUM CHLORIDE 20 MEQ: 14.9 INJECTION, SOLUTION INTRAVENOUS at 15:16

## 2020-07-23 RX ADMIN — POTASSIUM CHLORIDE 20 MEQ: 14.9 INJECTION, SOLUTION INTRAVENOUS at 11:15

## 2020-07-23 RX ADMIN — CEFAZOLIN SODIUM 2000 MG: 2 SOLUTION INTRAVENOUS at 17:29

## 2020-07-23 RX ADMIN — HEPARIN SODIUM 5000 UNITS: 5000 INJECTION INTRAVENOUS; SUBCUTANEOUS at 05:14

## 2020-07-23 RX ADMIN — METRONIDAZOLE 500 MG: 500 INJECTION, SOLUTION INTRAVENOUS at 18:45

## 2020-07-23 RX ADMIN — METRONIDAZOLE 500 MG: 500 INJECTION, SOLUTION INTRAVENOUS at 11:11

## 2020-07-23 RX ADMIN — DEXTROSE AND SODIUM CHLORIDE 110 ML/HR: 5; .45 INJECTION, SOLUTION INTRAVENOUS at 05:13

## 2020-07-23 RX ADMIN — POTASSIUM CHLORIDE 20 MEQ: 14.9 INJECTION, SOLUTION INTRAVENOUS at 13:12

## 2020-07-23 RX ADMIN — METRONIDAZOLE 500 MG: 500 INJECTION, SOLUTION INTRAVENOUS at 02:23

## 2020-07-23 RX ADMIN — CEFAZOLIN SODIUM 2000 MG: 2 SOLUTION INTRAVENOUS at 01:12

## 2020-07-23 RX ADMIN — HEPARIN SODIUM 5000 UNITS: 5000 INJECTION INTRAVENOUS; SUBCUTANEOUS at 15:15

## 2020-07-23 NOTE — PROGRESS NOTES
Progress Note - General Surgery   Charo Olson 26 y o  female MRN: [id number]  Unit/Bed#: [facility] 923-01 Encounter: [id number]    Assessment:  27 yo female with choledocholithiasis        Plan:  NPO  F/u MRCP  Ancef/Flagyl  Trend LFTs/bili  IVF    Subjective/Objective       Subjective: Pain improving  ROYER  Denies nausea, vomiting, fever, chills  Objective:   Blood pressure 98/69, pulse 66, temperature 98 3 °F (36 8 °C), resp  rate 18, height 5' 3" (1 6 m), weight 69 8 kg (153 lb 14 1 oz), last menstrual period 07/02/2020, SpO2 100 %  ,Body mass index is 27 26 kg/m²    PE:   General: NAD  Head: normocephalic, atraumatic  CV: Pulse regular  Lungs: no conversational dyspnea  Abdomen: soft, non distended, minimal TTP RUQ, no guarding or rebound  Extremities: JIANG, motor sensory intact  Neuro: awake, alert, answers questions appropriately      Intake/Output Summary (Last 24 hours) at 7/23/2020 0725  Last data filed at 7/23/2020 0513  Gross per 24 hour   Intake 3168 84 ml   Output 1850 ml   Net 1318 84 ml       Invasive Devices     Peripheral Intravenous Line            Peripheral IV 07/21/20 Left Antecubital 1 day              Scheduled Meds:    Current Facility-Administered Medications:  acetaminophen 650 mg Oral Q6H PRN Danny Bishop [place], DO    cefazolin 2,000 mg Intravenous Q8H Orvis Dory, DO Last Rate: Stopped (07/23/20 0142)   dextrose 5 % and sodium chloride 0 45 % 110 mL/hr Intravenous Continuous Danny Vincentbrook, DO Last Rate: 110 mL/hr (07/23/20 0513)   heparin (porcine) 5,000 Units Subcutaneous Q8H [address] Roverto P Rachell, DO    HYDROmorphone 0 5 mg Intravenous Q4H PRN Sofie Young DO    metroNIDAZOLE 500 mg Intravenous Q8H Roverto P Mariosbrook, DO Last Rate: 500 mg (07/23/20 0223)   ondansetron 4 mg Intravenous Q6H PRN Danny Bishop [place], DO    oxyCODONE 10 mg Oral Q4H PRN Danny Lovett,     oxyCODONE 5 mg Oral Q4H PRN Roverto Lovett,       Continuous Infusions:    dextrose 5 % and sodium chloride 0 45 % 110 mL/hr Last Rate: 110 mL/hr (07/23/20 0513)     PRN Meds:   acetaminophen    HYDROmorphone    ondansetron    oxyCODONE    oxyCODONE      Lab, Imaging and other studies:I have personally reviewed pertinent lab results      VTE Pharmacologic Prophylaxis: Heparin  VTE Mechanical Prophylaxis: sequential compression device

## 2020-07-23 NOTE — QUICK NOTE
Patient scheduled for OR today for a Lap Frances    Covid test returned positive  OR cancelled at this time  Antibiotics continue  Transfer to [address] [place] [address] [address] for a bed with airborne and contact isolation

## 2020-07-23 NOTE — QUICK NOTE
Nurse-Patient-Provider rounds were completed with the patient's nurse today, Freida Manning  We discussed the plan is to Or today  We reviewed all of the invasive devices/lines/telemetry orders   - None  peripheral line    DVT Prophylaxis:  venodynes    Pain Assessment / Plan:  - Continue current analgesic regimen  Mobility Assessment / Plan:  - Activity as tolerated  Goals / Barriers for discharge:  OR for a Lap Frances and continue to monitor Hgb level  Case management following; case and discharge needs discussed  All questions and concerns were addressed  I spent greater than 7 minutes reviewing the plan with the patient and the nurse, and coordinating care for the day      NATE Phelps  7/23/2020

## 2020-07-23 NOTE — PROGRESS NOTES
GI Progress Note - Roni Patel 26 y o  female MRN: [id number]    Unit/Bed#: [facility] 923-01 Encounter: [id number]      Assessment:  The patient is a 26year-old female with a PMHx of asthma who is here for RUQ pain, and is being consulted regarding the investigation of possible choledocholithiasis  Plan:  # RUQ Pain  # Nausea  # Dilated CBD  The patient was evaluated this morning via MRCP  The study showed a dilated CBD at 8 9 mm with no choledocholithiasis, and the GB was distended with multiple gallstones and pericholecystic fluid  With these findings, there is no indication for an ERCP today as the stone most likely passed and her LFTs are trending down  The patient has no history of abdominal surgery  The patient has no family history of GI malignancy  The patient's hemoglobin continues to drop, now at 7 1  The primary team is aware, and we can get an EGD tomorrow if it continues to fall  Will also order FIT testing and iron studies  DDx: choledocholithiasis (suspected), primary sclerosing cholangitis, Mirizzi syndrome, sphincter of Oddi dysfunction, reflux, cholangiocarcinoma, pancreatic tumor     We will now sign off on this patient  Please do not hesitate to contact with any questions or concerns  Subjective: The patient was seen and examined at the bedside  There were no acute events overnight  She was resting comfortably in bed  The patient states that her abdominal pain is improving  She denies hearing/vision changes, dysphagia, nausea, vomiting, diarrhea, constipation, hematochezia, dysuria, and peripheral edema  She reports some discomfort in her back  She denies any symptoms of anemia, even with a hemoglobin of 7 1  LFTs down considerably today:  ->182,  ->221, TBili 2 25 ->0 45, Alk Phos 141 ->118  Objective:     Vitals: Blood pressure 98/69, pulse 66, temperature 98 3 °F (36 8 °C), resp   rate 18, height 5' 3" (1 6 m), weight 69 8 kg (153 lb 14 1 oz), last menstrual period 07/02/2020, SpO2 100 %  ,Body mass index is 27 26 kg/m²  Intake/Output Summary (Last 24 hours) at 7/23/2020 0835  Last data filed at 7/23/2020 5361  Gross per 24 hour   Intake 3168 84 ml   Output 2150 ml   Net 1018 84 ml       Physical Exam: General appearance: alert and oriented, in no acute distress  Head: Normocephalic, without obvious abnormality, atraumatic  Eyes: conjunctivae/corneas clear  PERRL, EOM's intact  Fundi benign  Throat: lips, mucosa, and tongue normal; teeth and gums normal  Neck: no adenopathy, no carotid bruit, no JVD and supple, symmetrical, trachea midline  Lungs: clear to auscultation bilaterally  Heart: regular rate and rhythm, S1, S2 normal, no murmur, click, rub or gallop  Abdomen: RUQ TTP, negative Pascal's sign, soft, NT, ND, +BS  Extremities: extremities normal, warm and well-perfused; no cyanosis, clubbing, or edema  Pulses: 2+ and symmetric  Skin: Skin color, texture, turgor normal  No rashes or lesions  Neurologic: Grossly normal     Invasive Devices     Peripheral Intravenous Line            Peripheral IV 07/21/20 Left Antecubital 1 day                Lab, Imaging and other studies: I have personally reviewed pertinent reports      VTE Pharmacologic Prophylaxis: Heparin  VTE Mechanical Prophylaxis: sequential compression device     HAROLDO Mario , PGY-1 Internal Medicine  GI Service

## 2020-07-24 VITALS
DIASTOLIC BLOOD PRESSURE: 63 MMHG | OXYGEN SATURATION: 98 % | SYSTOLIC BLOOD PRESSURE: 90 MMHG | HEIGHT: 63 IN | TEMPERATURE: 98.3 F | WEIGHT: 153.88 LBS | RESPIRATION RATE: 18 BRPM | BODY MASS INDEX: 27.27 KG/M2 | HEART RATE: 70 BPM

## 2020-07-24 LAB
ALBUMIN SERPL BCP-MCNC: 3 G/DL (ref 3.5–5)
ALP SERPL-CCNC: 106 U/L (ref 46–116)
ALT SERPL W P-5'-P-CCNC: 140 U/L (ref 12–78)
ANION GAP SERPL CALCULATED.3IONS-SCNC: 6 MMOL/L (ref 4–13)
AST SERPL W P-5'-P-CCNC: 71 U/L (ref 5–45)
BASOPHILS # BLD AUTO: 0.01 THOUSANDS/ΜL (ref 0–0.1)
BASOPHILS NFR BLD AUTO: 0 % (ref 0–1)
BILIRUB SERPL-MCNC: 0.37 MG/DL (ref 0.2–1)
BUN SERPL-MCNC: 7 MG/DL (ref 5–25)
CALCIUM SERPL-MCNC: 9.2 MG/DL (ref 8.3–10.1)
CHLORIDE SERPL-SCNC: 111 MMOL/L (ref 100–108)
CO2 SERPL-SCNC: 24 MMOL/L (ref 21–32)
CREAT SERPL-MCNC: 0.48 MG/DL (ref 0.6–1.3)
EOSINOPHIL # BLD AUTO: 0.09 THOUSAND/ΜL (ref 0–0.61)
EOSINOPHIL NFR BLD AUTO: 2 % (ref 0–6)
ERYTHROCYTE [DISTWIDTH] IN BLOOD BY AUTOMATED COUNT: 16 % (ref 11.6–15.1)
GFR SERPL CREATININE-BSD FRML MDRD: 132 ML/MIN/1.73SQ M
GLUCOSE SERPL-MCNC: 91 MG/DL (ref 65–140)
HCT VFR BLD AUTO: 26.9 % (ref 34.8–46.1)
HGB BLD-MCNC: 7.4 G/DL (ref 11.5–15.4)
IMM GRANULOCYTES # BLD AUTO: 0.01 THOUSAND/UL (ref 0–0.2)
IMM GRANULOCYTES NFR BLD AUTO: 0 % (ref 0–2)
LYMPHOCYTES # BLD AUTO: 1.69 THOUSANDS/ΜL (ref 0.6–4.47)
LYMPHOCYTES NFR BLD AUTO: 41 % (ref 14–44)
MAGNESIUM SERPL-MCNC: 2.2 MG/DL (ref 1.6–2.6)
MCH RBC QN AUTO: 21.1 PG (ref 26.8–34.3)
MCHC RBC AUTO-ENTMCNC: 27.5 G/DL (ref 31.4–37.4)
MCV RBC AUTO: 77 FL (ref 82–98)
MONOCYTES # BLD AUTO: 0.4 THOUSAND/ΜL (ref 0.17–1.22)
MONOCYTES NFR BLD AUTO: 10 % (ref 4–12)
NEUTROPHILS # BLD AUTO: 1.92 THOUSANDS/ΜL (ref 1.85–7.62)
NEUTS SEG NFR BLD AUTO: 47 % (ref 43–75)
NRBC BLD AUTO-RTO: 0 /100 WBCS
PLATELET # BLD AUTO: 272 THOUSANDS/UL (ref 149–390)
PMV BLD AUTO: 11.1 FL (ref 8.9–12.7)
POTASSIUM SERPL-SCNC: 3.5 MMOL/L (ref 3.5–5.3)
PROT SERPL-MCNC: 6.4 G/DL (ref 6.4–8.2)
RBC # BLD AUTO: 3.5 MILLION/UL (ref 3.81–5.12)
SODIUM SERPL-SCNC: 141 MMOL/L (ref 136–145)
VIT B12 SERPL-MCNC: 543 PG/ML (ref 100–900)
WBC # BLD AUTO: 4.12 THOUSAND/UL (ref 4.31–10.16)

## 2020-07-24 PROCEDURE — 85025 COMPLETE CBC W/AUTO DIFF WBC: CPT | Performed by: SURGERY

## 2020-07-24 PROCEDURE — NC001 PR NO CHARGE: Performed by: SURGERY

## 2020-07-24 PROCEDURE — 82607 VITAMIN B-12: CPT | Performed by: STUDENT IN AN ORGANIZED HEALTH CARE EDUCATION/TRAINING PROGRAM

## 2020-07-24 PROCEDURE — 99238 HOSP IP/OBS DSCHRG MGMT 30/<: CPT | Performed by: SURGERY

## 2020-07-24 PROCEDURE — 83735 ASSAY OF MAGNESIUM: CPT | Performed by: SURGERY

## 2020-07-24 PROCEDURE — 80053 COMPREHEN METABOLIC PANEL: CPT | Performed by: SURGERY

## 2020-07-24 RX ORDER — ACETAMINOPHEN 325 MG/1
650 TABLET ORAL EVERY 6 HOURS PRN
Qty: 30 TABLET | Refills: 0 | Status: SHIPPED | OUTPATIENT
Start: 2020-07-24

## 2020-07-24 RX ORDER — OXYCODONE HYDROCHLORIDE 5 MG/1
5 TABLET ORAL EVERY 4 HOURS PRN
Qty: 20 TABLET | Refills: 0 | Status: SHIPPED | OUTPATIENT
Start: 2020-07-24 | End: 2020-08-03

## 2020-07-24 RX ORDER — FERROUS SULFATE 325(65) MG
325 TABLET ORAL
Qty: 30 TABLET | Refills: 0 | Status: SHIPPED | OUTPATIENT
Start: 2020-07-25

## 2020-07-24 RX ORDER — CEPHALEXIN 500 MG/1
500 CAPSULE ORAL EVERY 6 HOURS SCHEDULED
Qty: 20 CAPSULE | Refills: 0 | Status: SHIPPED | OUTPATIENT
Start: 2020-07-24 | End: 2020-07-29

## 2020-07-24 RX ORDER — FERROUS SULFATE 325(65) MG
325 TABLET ORAL
Status: DISCONTINUED | OUTPATIENT
Start: 2020-07-24 | End: 2020-07-24 | Stop reason: HOSPADM

## 2020-07-24 RX ORDER — POTASSIUM CHLORIDE 20 MEQ/1
40 TABLET, EXTENDED RELEASE ORAL ONCE
Status: COMPLETED | OUTPATIENT
Start: 2020-07-24 | End: 2020-07-24

## 2020-07-24 RX ORDER — METRONIDAZOLE 500 MG/1
500 TABLET ORAL EVERY 8 HOURS SCHEDULED
Qty: 15 TABLET | Refills: 0 | Status: SHIPPED | OUTPATIENT
Start: 2020-07-24 | End: 2020-07-29

## 2020-07-24 RX ADMIN — CEFAZOLIN SODIUM 2000 MG: 2 SOLUTION INTRAVENOUS at 09:34

## 2020-07-24 RX ADMIN — METRONIDAZOLE 500 MG: 500 INJECTION, SOLUTION INTRAVENOUS at 09:35

## 2020-07-24 RX ADMIN — FERROUS SULFATE TAB 325 MG (65 MG ELEMENTAL FE) 325 MG: 325 (65 FE) TAB at 09:35

## 2020-07-24 RX ADMIN — POTASSIUM CHLORIDE 40 MEQ: 1500 TABLET, EXTENDED RELEASE ORAL at 09:34

## 2020-07-24 RX ADMIN — HEPARIN SODIUM 5000 UNITS: 5000 INJECTION INTRAVENOUS; SUBCUTANEOUS at 05:14

## 2020-07-24 RX ADMIN — CEFAZOLIN SODIUM 2000 MG: 2 SOLUTION INTRAVENOUS at 00:51

## 2020-07-24 RX ADMIN — METRONIDAZOLE 500 MG: 500 INJECTION, SOLUTION INTRAVENOUS at 01:35

## 2020-07-24 NOTE — DISCHARGE SUMMARY
Discharge Summary - Hazeline Klinefelter 26 y o  female MRN: [id number]    [facility] Encounter: [id number]    Admission Date:   Admission Orders (From admission, onward)     Ordered        07/22/20 0042  Inpatient Admission  Once                     Admitting Diagnosis: Choledocholithiasis [K80 50]  Abdominal pain [R10 9]    HPI per resident:  SUSHANT Lovett:  "Hazeline Klinefelter is a 26 y o  female who presents with two days of RUQ abd pain  Reports the pain as a colicky pain that comes and goes  She reports similar episodes in the past especially after eating greasy foods  She reports nausea and one episode of emesis  Not taken anything to relieve pain  Denies any subjective fevers or chills  Denies diarrhea or constipation   "    Procedures Performed:   Orders Placed This Encounter   Procedures    ED ECG Documentation Only       Summary of Hospital Course: 25 y/o female admiited with two day history of RUQ abdominal pain  One episode of nausea and vomiting, no fevers, diarrhea or constipation  MRCP completed and scheduled for Lap Frances,  Covid tested positive, asymptomatic and surgery on hold  Will discharge home on 7 days of antibiotics, follow up COvid test and then see in office to schedule surgery  Touch base with PCP    Significant Findings, Care, Treatment and Services Provided: Xr Chest Portable    Result Date: 7/23/2020  Impression: No acute cardiopulmonary disease  Workstation performed: [id number]     Mri Abdomen Wo Contrast And Mrcp    Result Date: 7/23/2020  Impression: Dilated common bile duct however there is no evidence of choledocholithiasis  Cholelithiasis with pericholecystic fluid  This may be due to acute cholecystitis or related to hepatic parenchymal disease/hepatitis  Correlate clinically Workstation performed: [id number]     Us Abdomen Limited    Result Date: 7/21/2020  Impression: Dilated CBD  Trace pericholecystic fluid is noted    Gallbladder is with small stones seen within the lumen  Constellation of findings suggests acute cholecystitis however the gallbladder wall is normal; recommend clinical correlation  Echogenic foci within the wall of the gallbladder likely artifact compatible with adenomyomatosis  Starry jian appearance of the liver suggests hepatitis  Consider MRI/MRCP if indicated clinically  The study was marked in [facility] for immediate notification  Workstation performed: [id number]       Complications: none    Discharge Diagnosis: Choledocholithasis  Covid positive    Resolved Problems  Date Reviewed: 5/2/2017    None          Condition at Discharge: stable         Discharge instructions/Information to patient and family:   See after visit summary for information provided to patient and family  Provisions for Follow-Up Care:  See after visit summary for information related to follow-up care and any pertinent home health orders  PCP: NATE Wallace    Disposition: Home    Planned Readmission: No      Discharge Statement   I spent 29 minutes discharging the patient  This time was spent on the day of discharge  I had direct contact with the patient on the day of discharge  Additional documentation is required if more than 30 minutes were spent on discharge  Discharge Medications:  See after visit summary for reconciled discharge medications provided to patient and family

## 2020-07-24 NOTE — PLAN OF CARE
Problem: PAIN - ADULT  Goal: Verbalizes/displays adequate comfort level or baseline comfort level  Description  Interventions:  - Encourage patient to monitor pain and request assistance  - Assess pain using appropriate pain scale  - Administer analgesics based on type and severity of pain and evaluate response  - Implement non-pharmacological measures as appropriate and evaluate response  - Consider cultural and social influences on pain and pain management  - Notify physician/advanced practitioner if interventions unsuccessful or patient reports new pain  Outcome: Progressing     Problem: INFECTION - ADULT  Goal: Absence or prevention of progression during hospitalization  Description  INTERVENTIONS:  - Assess and monitor for signs and symptoms of infection  - Monitor lab/diagnostic results  - Monitor all insertion sites, i e  indwelling lines, tubes, and drains  - Monitor endotracheal if appropriate and nasal secretions for changes in amount and color  - [place] appropriate cooling/warming therapies per order  - Administer medications as ordered  - Instruct and encourage patient and family to use good hand hygiene technique  - Identify and instruct in appropriate isolation precautions for identified infection/condition  Outcome: Progressing  Goal: Absence of fever/infection during neutropenic period  Description  INTERVENTIONS:  - Monitor WBC    Outcome: Progressing     Problem: SAFETY ADULT  Goal: Patient will remain free of falls  Description  INTERVENTIONS:  - Assess patient frequently for physical needs  -  Identify cognitive and physical deficits and behaviors that affect risk of falls    -  [place] fall precautions as indicated by assessment   - Educate patient/family on patient safety including physical limitations  - Instruct patient to call for assistance with activity based on assessment  - Modify environment to reduce risk of injury  - Consider OT/PT consult to assist with strengthening/mobility  Outcome: Progressing  Goal: Maintain or return to baseline ADL function  Description  INTERVENTIONS:  -  Assess patient's ability to carry out ADLs; assess patient's baseline for ADL function and identify physical deficits which impact ability to perform ADLs (bathing, care of mouth/teeth, toileting, grooming, dressing, etc )  - Assess/evaluate cause of self-care deficits   - Assess range of motion  - Assess patient's mobility; develop plan if impaired  - Assess patient's need for assistive devices and provide as appropriate  - Encourage maximum independence but intervene and supervise when necessary  - Involve family in performance of ADLs  - Assess for home care needs following discharge   - Consider OT consult to assist with ADL evaluation and planning for discharge  - Provide patient education as appropriate  Outcome: Progressing  Goal: Maintain or return mobility status to optimal level  Description  INTERVENTIONS:  - Assess patient's baseline mobility status (ambulation, transfers, stairs, etc )    - Identify cognitive and physical deficits and behaviors that affect mobility  - Identify mobility aids required to assist with transfers and/or ambulation (gait belt, sit-to-stand, lift, walker, cane, etc )  - [place] fall precautions as indicated by assessment  - Record patient progress and toleration of activity level on Mobility SBAR; progress patient to next Phase/Stage  - Instruct patient to call for assistance with activity based on assessment  - Consider rehabilitation consult to assist with strengthening/weightbearing, etc   Outcome: Progressing     Problem: Potential for Falls  Goal: Patient will remain free of falls  Description  INTERVENTIONS:  - Assess patient frequently for physical needs  -  Identify cognitive and physical deficits and behaviors that affect risk of falls    -  [place] fall precautions as indicated by assessment   - Educate patient/family on patient safety including physical limitations  - Instruct patient to call for assistance with activity based on assessment  - Modify environment to reduce risk of injury  - Consider OT/PT consult to assist with strengthening/mobility  Outcome: Progressing

## 2020-07-24 NOTE — DISCHARGE INSTRUCTIONS
Please get the repeat Covid test on August 4th so we have results for your follow up visit  Practics social distancing and wear a mask  Plan:  Repeat Covid test            Continue antibiotics for 5 more days            Pain medicine as needed            Daily Iron tablet            Follow up in Surgery office on August 4 2020 to be seen and schedule surgery to remove the [facility] Bladder

## 2020-07-24 NOTE — QUICK NOTE
Nurse-Patient-Provider rounds were completed with the patient's nurse today  We discussed the plan is to complete 7 day course of antibiotics, then follow up in outpatient office in 1 week  Plan for surgery, retest Covid prior to surgery  We reviewed all of the invasive devices/lines/telemetry orders   - None  Peripheral line    DVT Prophylaxis:  venodynes  Sq heparin    Pain Assessment / Plan:  - Continue current analgesic regimen  Mobility Assessment / Plan:  - Activity as tolerated  Goals / Barriers for discharge:  COvid positive  Will require surgery, can be done as outpatient  Case management following; case and discharge needs discussed  All questions and concerns were addressed  I spent greater than 8 minutes reviewing the plan with the patient and the nurse, and coordinating care for the day      NATE Jimenez  7/24/2020

## 2020-07-24 NOTE — PROGRESS NOTES
Progress Note - General Surgery   Mallika Richards 26 y o  female MRN: [id number]  Unit/Bed#: [facility] 819-01 Encounter: [id number]    Assessment:  26 F w/ RUQ/epigastric pain cholecystitis vs choledcholithiasis and anemia    MRCP yesterday demonstrated dilated CBD however no evidence of choledocholithiasis  Cholelithiasis with pericholecystic fluid  Plan was to take her to the OR yesterday for lap [place]; however, patient tested positive for COVID  Anemia 8->7 5->7 1; Fe deficient in nature Ferritin 4 and low Fe  Stool studies and b12 pending    Doing well AVSS  Plan:  Monitor hbg and labs this am  Continue abx for cholecystisis  Will plan on lap choly when patient is covid negative  Regular diet  Likely to be d/c on abx    Subjective/Objective   Chief Complaint: abd pain    Subjective: patient did well overnight  No acute events  Denies nausea, vomiting, or abd pain  No SOB or cp  No subjective fevers chills    Objective: Physical Exam   Constitutional: She is oriented to person, place, and time  She appears well-developed  No distress  HENT:   Head: Normocephalic and atraumatic  Eyes: No scleral icterus  Cardiovascular: Normal rate and regular rhythm  Pulmonary/Chest: Effort normal and breath sounds normal    Abdominal: Soft  She exhibits no distension  There is no tenderness  There is no rebound and no guarding  Neurological: She is alert and oriented to person, place, and time  Skin: Skin is warm  Capillary refill takes less than 2 seconds  Nursing note and vitals reviewed  Blood pressure (!) 102/49, pulse 68, temperature 98 3 °F (36 8 °C), temperature source Oral, resp  rate 16, height 5' 3" (1 6 m), weight 69 8 kg (153 lb 14 1 oz), last menstrual period 07/02/2020, SpO2 97 %  ,Body mass index is 27 26 kg/m²        Intake/Output Summary (Last 24 hours) at 7/24/2020 0546  Last data filed at 7/24/2020 0234  Gross per 24 hour   Intake 650 ml   Output 1200 ml   Net -550 ml       Invasive Devices     Peripheral Intravenous Line            Peripheral IV 07/21/20 Left Antecubital 2 days              Scheduled Meds:  Current Facility-Administered Medications:  acetaminophen 650 mg Oral Q6H PRN Lindsey Gouty Allsbrook, DO    cefazolin 2,000 mg Intravenous Q8H Bethany Abbot, DO Last Rate: Stopped (07/24/20 0133)   heparin (porcine) 5,000 Units Subcutaneous Q8H [address] Roverto P Allsbrook, DO    HYDROmorphone 0 5 mg Intravenous Q4H PRN Carroll Tate, DO    metroNIDAZOLE 500 mg Intravenous Q8H [place] P Allsbrook, DO Last Rate: Stopped (07/24/20 0234)   ondansetron 4 mg Intravenous Q6H PRN Lindsey Gouty Allsbrook, DO    oxyCODONE 10 mg Oral Q4H PRN Lindsey Gouty Allsbrook, DO    oxyCODONE 5 mg Oral Q4H PRN Lindsey Gouty Allsbrook, DO    potassium chloride 20 mEq Intravenous Once Lindsey Gouty Allsbrook, DO      Continuous Infusions:   PRN Meds:   acetaminophen    HYDROmorphone    ondansetron    oxyCODONE    oxyCODONE      Lab, Imaging and other studies:I have personally reviewed pertinent lab results      VTE Pharmacologic Prophylaxis: Heparin  VTE Mechanical Prophylaxis: sequential compression device

## 2020-08-07 ENCOUNTER — TRANSCRIBE ORDERS (OUTPATIENT)
Dept: ADMINISTRATIVE | Facility: HOSPITAL | Age: 30
End: 2020-08-07

## 2020-08-07 ENCOUNTER — TELEPHONE (OUTPATIENT)
Dept: GASTROENTEROLOGY | Facility: CLINIC | Age: 30
End: 2020-08-07

## 2020-08-07 DIAGNOSIS — K81.9 CHOLECYSTITIS, UNSPECIFIED: Primary | ICD-10-CM

## 2020-08-07 NOTE — TELEPHONE ENCOUNTER
----- Message from Kimberli Hood DO sent at 7/23/2020  4:11 PM EDT -----  Regarding: Please schedule outpatient EGD/colonoscopy  Patient has severe iron deficiency anemia needs to have outpatient EGD and colonoscopy scheduled  Please note, patient was COVID-19 positive  She will need to have repeat outpatient COVID-19 testing  Thanks      --  Kimberli Hood DO  Gastroenterology Fellow, PGY-4  [address] [place] [address]

## 2020-08-07 NOTE — TELEPHONE ENCOUNTER
EGD/Colon scheduled for 10/08/20 with Dr [facility]  Miralax/Dulcolax instructions given verbally/mailed to patient

## 2020-09-21 ENCOUNTER — APPOINTMENT (INPATIENT)
Dept: RADIOLOGY | Facility: HOSPITAL | Age: 30
DRG: 263 | End: 2020-09-21
Payer: COMMERCIAL

## 2020-09-21 ENCOUNTER — HOSPITAL ENCOUNTER (INPATIENT)
Facility: HOSPITAL | Age: 30
LOS: 5 days | Discharge: HOME | DRG: 263 | End: 2020-09-26
Attending: EMERGENCY MEDICINE | Admitting: SURGERY
Payer: COMMERCIAL

## 2020-09-21 DIAGNOSIS — K80.20 SYMPTOMATIC CHOLELITHIASIS: Primary | ICD-10-CM

## 2020-09-21 DIAGNOSIS — K80.50 CHOLEDOCHOLITHIASIS: ICD-10-CM

## 2020-09-21 DIAGNOSIS — K81.2 ACUTE ON CHRONIC CHOLECYSTITIS: ICD-10-CM

## 2020-09-21 LAB
ALBUMIN SERPL BCP-MCNC: 3.6 G/DL (ref 3.5–5)
ALBUMIN SERPL BCP-MCNC: 4.1 G/DL (ref 3.5–5)
ALP SERPL-CCNC: 76 U/L (ref 46–116)
ALP SERPL-CCNC: 83 U/L (ref 46–116)
ALT SERPL W P-5'-P-CCNC: 261 U/L (ref 12–78)
ALT SERPL W P-5'-P-CCNC: 294 U/L (ref 12–78)
ANION GAP SERPL CALCULATED.3IONS-SCNC: 5 MMOL/L (ref 4–13)
ANION GAP SERPL CALCULATED.3IONS-SCNC: 7 MMOL/L (ref 4–13)
AST SERPL W P-5'-P-CCNC: 295 U/L (ref 5–45)
AST SERPL W P-5'-P-CCNC: 382 U/L (ref 5–45)
BASOPHILS # BLD AUTO: 0.01 THOUSANDS/ΜL (ref 0–0.1)
BASOPHILS # BLD AUTO: 0.02 THOUSANDS/ΜL (ref 0–0.1)
BASOPHILS NFR BLD AUTO: 0 % (ref 0–1)
BASOPHILS NFR BLD AUTO: 0 % (ref 0–1)
BILIRUB SERPL-MCNC: 0.77 MG/DL (ref 0.2–1)
BILIRUB SERPL-MCNC: 1.07 MG/DL (ref 0.2–1)
BUN SERPL-MCNC: 10 MG/DL (ref 5–25)
BUN SERPL-MCNC: 9 MG/DL (ref 5–25)
CALCIUM SERPL-MCNC: 8.6 MG/DL (ref 8.3–10.1)
CALCIUM SERPL-MCNC: 9 MG/DL (ref 8.3–10.1)
CHLORIDE SERPL-SCNC: 109 MMOL/L (ref 100–108)
CHLORIDE SERPL-SCNC: 111 MMOL/L (ref 100–108)
CO2 SERPL-SCNC: 25 MMOL/L (ref 21–32)
CO2 SERPL-SCNC: 26 MMOL/L (ref 21–32)
CREAT SERPL-MCNC: 0.44 MG/DL (ref 0.6–1.3)
CREAT SERPL-MCNC: 0.58 MG/DL (ref 0.6–1.3)
EOSINOPHIL # BLD AUTO: 0.02 THOUSAND/ΜL (ref 0–0.61)
EOSINOPHIL # BLD AUTO: 0.05 THOUSAND/ΜL (ref 0–0.61)
EOSINOPHIL NFR BLD AUTO: 1 % (ref 0–6)
EOSINOPHIL NFR BLD AUTO: 1 % (ref 0–6)
ERYTHROCYTE [DISTWIDTH] IN BLOOD BY AUTOMATED COUNT: 16.7 % (ref 11.6–15.1)
ERYTHROCYTE [DISTWIDTH] IN BLOOD BY AUTOMATED COUNT: 16.8 % (ref 11.6–15.1)
GFR SERPL CREATININE-BSD FRML MDRD: 124 ML/MIN/1.73SQ M
GFR SERPL CREATININE-BSD FRML MDRD: 136 ML/MIN/1.73SQ M
GGT SERPL-CCNC: 92 U/L (ref 5–85)
GLUCOSE SERPL-MCNC: 88 MG/DL (ref 65–140)
GLUCOSE SERPL-MCNC: 91 MG/DL (ref 65–140)
HCG SERPL QL: NEGATIVE
HCT VFR BLD AUTO: 28.2 % (ref 34.8–46.1)
HCT VFR BLD AUTO: 29.3 % (ref 34.8–46.1)
HGB BLD-MCNC: 7.7 G/DL (ref 11.5–15.4)
HGB BLD-MCNC: 8.3 G/DL (ref 11.5–15.4)
IMM GRANULOCYTES # BLD AUTO: 0.01 THOUSAND/UL (ref 0–0.2)
IMM GRANULOCYTES # BLD AUTO: 0.01 THOUSAND/UL (ref 0–0.2)
IMM GRANULOCYTES NFR BLD AUTO: 0 % (ref 0–2)
IMM GRANULOCYTES NFR BLD AUTO: 0 % (ref 0–2)
LIPASE SERPL-CCNC: 174 U/L (ref 73–393)
LYMPHOCYTES # BLD AUTO: 0.83 THOUSANDS/ΜL (ref 0.6–4.47)
LYMPHOCYTES # BLD AUTO: 1.67 THOUSANDS/ΜL (ref 0.6–4.47)
LYMPHOCYTES NFR BLD AUTO: 24 % (ref 14–44)
LYMPHOCYTES NFR BLD AUTO: 36 % (ref 14–44)
MCH RBC QN AUTO: 20.9 PG (ref 26.8–34.3)
MCH RBC QN AUTO: 21.4 PG (ref 26.8–34.3)
MCHC RBC AUTO-ENTMCNC: 27.3 G/DL (ref 31.4–37.4)
MCHC RBC AUTO-ENTMCNC: 28.3 G/DL (ref 31.4–37.4)
MCV RBC AUTO: 76 FL (ref 82–98)
MCV RBC AUTO: 76 FL (ref 82–98)
MONOCYTES # BLD AUTO: 0.19 THOUSAND/ΜL (ref 0.17–1.22)
MONOCYTES # BLD AUTO: 0.32 THOUSAND/ΜL (ref 0.17–1.22)
MONOCYTES NFR BLD AUTO: 6 % (ref 4–12)
MONOCYTES NFR BLD AUTO: 7 % (ref 4–12)
NEUTROPHILS # BLD AUTO: 2.34 THOUSANDS/ΜL (ref 1.85–7.62)
NEUTROPHILS # BLD AUTO: 2.54 THOUSANDS/ΜL (ref 1.85–7.62)
NEUTS SEG NFR BLD AUTO: 56 % (ref 43–75)
NEUTS SEG NFR BLD AUTO: 69 % (ref 43–75)
NRBC BLD AUTO-RTO: 0 /100 WBCS
NRBC BLD AUTO-RTO: 0 /100 WBCS
PLATELET # BLD AUTO: 276 THOUSANDS/UL (ref 149–390)
PLATELET # BLD AUTO: 279 THOUSANDS/UL (ref 149–390)
PLATELET # BLD AUTO: 355 THOUSANDS/UL (ref 149–390)
PMV BLD AUTO: 10 FL (ref 8.9–12.7)
PMV BLD AUTO: 10.2 FL (ref 8.9–12.7)
PMV BLD AUTO: 10.5 FL (ref 8.9–12.7)
POTASSIUM SERPL-SCNC: 3.4 MMOL/L (ref 3.5–5.3)
POTASSIUM SERPL-SCNC: 3.6 MMOL/L (ref 3.5–5.3)
PROT SERPL-MCNC: 7.2 G/DL (ref 6.4–8.2)
PROT SERPL-MCNC: 7.9 G/DL (ref 6.4–8.2)
RBC # BLD AUTO: 3.69 MILLION/UL (ref 3.81–5.12)
RBC # BLD AUTO: 3.87 MILLION/UL (ref 3.81–5.12)
SODIUM SERPL-SCNC: 141 MMOL/L (ref 136–145)
SODIUM SERPL-SCNC: 142 MMOL/L (ref 136–145)
WBC # BLD AUTO: 3.4 THOUSAND/UL (ref 4.31–10.16)
WBC # BLD AUTO: 4.61 THOUSAND/UL (ref 4.31–10.16)

## 2020-09-21 PROCEDURE — 99285 EMERGENCY DEPT VISIT HI MDM: CPT

## 2020-09-21 PROCEDURE — 99223 1ST HOSP IP/OBS HIGH 75: CPT | Performed by: SURGERY

## 2020-09-21 PROCEDURE — 76705 ECHO EXAM OF ABDOMEN: CPT

## 2020-09-21 PROCEDURE — 76705 ECHO EXAM OF ABDOMEN: CPT | Performed by: EMERGENCY MEDICINE

## 2020-09-21 PROCEDURE — 85025 COMPLETE CBC W/AUTO DIFF WBC: CPT | Performed by: EMERGENCY MEDICINE

## 2020-09-21 PROCEDURE — 99222 1ST HOSP IP/OBS MODERATE 55: CPT | Performed by: INTERNAL MEDICINE

## 2020-09-21 PROCEDURE — 99285 EMERGENCY DEPT VISIT HI MDM: CPT | Performed by: EMERGENCY MEDICINE

## 2020-09-21 PROCEDURE — 80053 COMPREHEN METABOLIC PANEL: CPT | Performed by: EMERGENCY MEDICINE

## 2020-09-21 PROCEDURE — 83690 ASSAY OF LIPASE: CPT | Performed by: EMERGENCY MEDICINE

## 2020-09-21 PROCEDURE — 36415 COLL VENOUS BLD VENIPUNCTURE: CPT | Performed by: EMERGENCY MEDICINE

## 2020-09-21 PROCEDURE — 96375 TX/PRO/DX INJ NEW DRUG ADDON: CPT

## 2020-09-21 PROCEDURE — 96361 HYDRATE IV INFUSION ADD-ON: CPT

## 2020-09-21 PROCEDURE — 84703 CHORIONIC GONADOTROPIN ASSAY: CPT | Performed by: SURGERY

## 2020-09-21 PROCEDURE — 85025 COMPLETE CBC W/AUTO DIFF WBC: CPT | Performed by: STUDENT IN AN ORGANIZED HEALTH CARE EDUCATION/TRAINING PROGRAM

## 2020-09-21 PROCEDURE — 96374 THER/PROPH/DIAG INJ IV PUSH: CPT

## 2020-09-21 PROCEDURE — 74181 MRI ABDOMEN W/O CONTRAST: CPT

## 2020-09-21 PROCEDURE — 80053 COMPREHEN METABOLIC PANEL: CPT | Performed by: STUDENT IN AN ORGANIZED HEALTH CARE EDUCATION/TRAINING PROGRAM

## 2020-09-21 PROCEDURE — G1004 CDSM NDSC: HCPCS

## 2020-09-21 PROCEDURE — 82977 ASSAY OF GGT: CPT | Performed by: EMERGENCY MEDICINE

## 2020-09-21 PROCEDURE — 85049 AUTOMATED PLATELET COUNT: CPT | Performed by: STUDENT IN AN ORGANIZED HEALTH CARE EDUCATION/TRAINING PROGRAM

## 2020-09-21 RX ORDER — ONDANSETRON 2 MG/ML
4 INJECTION INTRAMUSCULAR; INTRAVENOUS ONCE
Status: COMPLETED | OUTPATIENT
Start: 2020-09-21 | End: 2020-09-21

## 2020-09-21 RX ORDER — ONDANSETRON 2 MG/ML
4 INJECTION INTRAMUSCULAR; INTRAVENOUS EVERY 6 HOURS PRN
Status: DISPENSED | OUTPATIENT
Start: 2020-09-21 | End: 2020-09-24

## 2020-09-21 RX ORDER — POTASSIUM CHLORIDE 14.9 MG/ML
20 INJECTION INTRAVENOUS
Status: COMPLETED | OUTPATIENT
Start: 2020-09-21 | End: 2020-09-21

## 2020-09-21 RX ORDER — CEFAZOLIN SODIUM 2 G/50ML
2000 SOLUTION INTRAVENOUS
Status: ACTIVE | OUTPATIENT
Start: 2020-09-21 | End: 2020-09-22

## 2020-09-21 RX ORDER — HEPARIN SODIUM 5000 [USP'U]/ML
5000 INJECTION, SOLUTION INTRAVENOUS; SUBCUTANEOUS EVERY 8 HOURS SCHEDULED
Status: DISCONTINUED | OUTPATIENT
Start: 2020-09-21 | End: 2020-09-26 | Stop reason: HOSPADM

## 2020-09-21 RX ORDER — CEFAZOLIN SODIUM 2 G/50ML
2000 SOLUTION INTRAVENOUS EVERY 8 HOURS
Status: DISCONTINUED | OUTPATIENT
Start: 2020-09-21 | End: 2020-09-23

## 2020-09-21 RX ORDER — OXYCODONE HYDROCHLORIDE 5 MG/1
5 TABLET ORAL EVERY 4 HOURS PRN
Status: DISCONTINUED | OUTPATIENT
Start: 2020-09-21 | End: 2020-09-23

## 2020-09-21 RX ORDER — SODIUM CHLORIDE, SODIUM LACTATE, POTASSIUM CHLORIDE, CALCIUM CHLORIDE 600; 310; 30; 20 MG/100ML; MG/100ML; MG/100ML; MG/100ML
125 INJECTION, SOLUTION INTRAVENOUS CONTINUOUS
Status: DISCONTINUED | OUTPATIENT
Start: 2020-09-21 | End: 2020-09-23

## 2020-09-21 RX ORDER — HYDROMORPHONE HCL/PF 1 MG/ML
0.2 SYRINGE (ML) INJECTION
Status: DISCONTINUED | OUTPATIENT
Start: 2020-09-21 | End: 2020-09-26 | Stop reason: HOSPADM

## 2020-09-21 RX ORDER — OXYCODONE HYDROCHLORIDE 10 MG/1
10 TABLET ORAL EVERY 4 HOURS PRN
Status: DISCONTINUED | OUTPATIENT
Start: 2020-09-21 | End: 2020-09-26 | Stop reason: HOSPADM

## 2020-09-21 RX ORDER — HYDROMORPHONE HCL/PF 1 MG/ML
0.5 SYRINGE (ML) INJECTION ONCE
Status: COMPLETED | OUTPATIENT
Start: 2020-09-21 | End: 2020-09-21

## 2020-09-21 RX ADMIN — HEPARIN SODIUM 5000 UNITS: 5000 INJECTION INTRAVENOUS; SUBCUTANEOUS at 07:50

## 2020-09-21 RX ADMIN — SODIUM CHLORIDE, SODIUM LACTATE, POTASSIUM CHLORIDE, AND CALCIUM CHLORIDE 125 ML/HR: .6; .31; .03; .02 INJECTION, SOLUTION INTRAVENOUS at 21:22

## 2020-09-21 RX ADMIN — CEFAZOLIN SODIUM 2000 MG: 2 SOLUTION INTRAVENOUS at 21:24

## 2020-09-21 RX ADMIN — OXYCODONE HYDROCHLORIDE 5 MG: 5 TABLET ORAL at 08:40

## 2020-09-21 RX ADMIN — METRONIDAZOLE 500 MG: 500 INJECTION, SOLUTION INTRAVENOUS at 21:24

## 2020-09-21 RX ADMIN — POTASSIUM CHLORIDE 20 MEQ: 14.9 INJECTION, SOLUTION INTRAVENOUS at 11:19

## 2020-09-21 RX ADMIN — HEPARIN SODIUM 5000 UNITS: 5000 INJECTION INTRAVENOUS; SUBCUTANEOUS at 14:52

## 2020-09-21 RX ADMIN — ONDANSETRON 4 MG: 2 INJECTION INTRAMUSCULAR; INTRAVENOUS at 01:12

## 2020-09-21 RX ADMIN — SODIUM CHLORIDE 1000 ML: 0.9 INJECTION, SOLUTION INTRAVENOUS at 01:11

## 2020-09-21 RX ADMIN — SODIUM CHLORIDE, SODIUM LACTATE, POTASSIUM CHLORIDE, AND CALCIUM CHLORIDE 125 ML/HR: .6; .31; .03; .02 INJECTION, SOLUTION INTRAVENOUS at 07:51

## 2020-09-21 RX ADMIN — SODIUM CHLORIDE, SODIUM LACTATE, POTASSIUM CHLORIDE, AND CALCIUM CHLORIDE 1000 ML: .6; .31; .03; .02 INJECTION, SOLUTION INTRAVENOUS at 23:53

## 2020-09-21 RX ADMIN — POTASSIUM CHLORIDE 20 MEQ: 14.9 INJECTION, SOLUTION INTRAVENOUS at 13:05

## 2020-09-21 RX ADMIN — SODIUM CHLORIDE, SODIUM LACTATE, POTASSIUM CHLORIDE, AND CALCIUM CHLORIDE 125 ML/HR: .6; .31; .03; .02 INJECTION, SOLUTION INTRAVENOUS at 11:28

## 2020-09-21 RX ADMIN — HEPARIN SODIUM 5000 UNITS: 5000 INJECTION INTRAVENOUS; SUBCUTANEOUS at 21:23

## 2020-09-21 RX ADMIN — ONDANSETRON 4 MG: 2 INJECTION INTRAMUSCULAR; INTRAVENOUS at 08:40

## 2020-09-21 RX ADMIN — HYDROMORPHONE HYDROCHLORIDE 0.5 MG: 1 INJECTION, SOLUTION INTRAMUSCULAR; INTRAVENOUS; SUBCUTANEOUS at 01:12

## 2020-09-21 NOTE — PLAN OF CARE
Problem: PAIN - ADULT  Goal: Verbalizes/displays adequate comfort level or baseline comfort level  Description: Interventions:  - Encourage patient to monitor pain and request assistance  - Assess pain using appropriate pain scale  - Administer analgesics based on type and severity of pain and evaluate response  - Implement non-pharmacological measures as appropriate and evaluate response  - Consider cultural and social influences on pain and pain management  - Notify physician/advanced practitioner if interventions unsuccessful or patient reports new pain  Outcome: Progressing     Problem: INFECTION - ADULT  Goal: Absence or prevention of progression during hospitalization  Description: INTERVENTIONS:  - Assess and monitor for signs and symptoms of infection  - Monitor lab/diagnostic results  - Monitor all insertion sites, i e  indwelling lines, tubes, and drains  - Monitor endotracheal if appropriate and nasal secretions for changes in amount and color  - [place] appropriate cooling/warming therapies per order  - Administer medications as ordered  - Instruct and encourage patient and family to use good hand hygiene technique  - Identify and instruct in appropriate isolation precautions for identified infection/condition  Outcome: Progressing  Goal: Absence of fever/infection during neutropenic period  Description: INTERVENTIONS:  - Monitor WBC    Outcome: Progressing     Problem: SAFETY ADULT  Goal: Patient will remain free of falls  Description: INTERVENTIONS:  - Assess patient frequently for physical needs  -  Identify cognitive and physical deficits and behaviors that affect risk of falls    -  [place] fall precautions as indicated by assessment   - Educate patient/family on patient safety including physical limitations  - Instruct patient to call for assistance with activity based on assessment  - Modify environment to reduce risk of injury  - Consider OT/PT consult to assist with strengthening/mobility  Outcome: Progressing  Goal: Maintain or return to baseline ADL function  Description: INTERVENTIONS:  -  Assess patient's ability to carry out ADLs; assess patient's baseline for ADL function and identify physical deficits which impact ability to perform ADLs (bathing, care of mouth/teeth, toileting, grooming, dressing, etc )  - Assess/evaluate cause of self-care deficits   - Assess range of motion  - Assess patient's mobility; develop plan if impaired  - Assess patient's need for assistive devices and provide as appropriate  - Encourage maximum independence but intervene and supervise when necessary  - Involve family in performance of ADLs  - Assess for home care needs following discharge   - Consider OT consult to assist with ADL evaluation and planning for discharge  - Provide patient education as appropriate  Outcome: Progressing  Goal: Maintain or return mobility status to optimal level  Description: INTERVENTIONS:  - Assess patient's baseline mobility status (ambulation, transfers, stairs, etc )    - Identify cognitive and physical deficits and behaviors that affect mobility  - Identify mobility aids required to assist with transfers and/or ambulation (gait belt, sit-to-stand, lift, walker, cane, etc )  - [place] fall precautions as indicated by assessment  - Record patient progress and toleration of activity level on Mobility SBAR; progress patient to next Phase/Stage  - Instruct patient to call for assistance with activity based on assessment  - Consider rehabilitation consult to assist with strengthening/weightbearing, etc   Outcome: Progressing     Problem: DISCHARGE PLANNING  Goal: Discharge to home or other facility with appropriate resources  Description: INTERVENTIONS:  - Identify barriers to discharge w/patient and caregiver  - Arrange for needed discharge resources and transportation as appropriate  - Identify discharge learning needs (meds, wound care, etc )  - Arrange for interpretive services to assist at discharge as needed  - Refer to Case Management Department for coordinating discharge planning if the patient needs post-hospital services based on physician/advanced practitioner order or complex needs related to functional status, cognitive ability, or social support system  Outcome: Progressing     Problem: Knowledge Deficit  Goal: Patient/family/caregiver demonstrates understanding of disease process, treatment plan, medications, and discharge instructions  Description: Complete learning assessment and assess knowledge base    Interventions:  - Provide teaching at level of understanding  - Provide teaching via preferred learning methods  Outcome: Progressing

## 2020-09-21 NOTE — CONSULTS
Consultation - [facility]  Karla Burt 26 y o  female MRN: [id number]  Unit/Bed#: [address] 823-01 Encounter: [id number]    Reason for Consult / Principal Problem:   Abdominal pain, gallstone, worsening LFTS, dilated CBD      ASSESSMENT AND PLAN:      1  RUQ pain 2/2  choledocolithiasis , symptomatic cholelithiasis   - US RUQ showed '' Cholelithiasis with gallbladder distention, diffuse wall thickening, and trace pericholecystic fluid, which may be due to acute cholecystitis in the appropriate clinical setting  Dilatation of the common bile duct with findings suspicious for choledocholithiasis  2  Transaminitis   3  H/o iron deficiency anemia --> Outpatient EGD/ Colonoscopy scheduled for 10/8 for evaluation of MARIBELL  Plan  - Continue NPO status   - Follow MRCP results  Plan for ERCP/ Lap cholecystectomy based on results  - Trend AST/ALT, T  Bilirubin  - Analgesia, and IV fluids per  Primary team      Thank you for consult      ____________________________________________      HISTORY OF PRESENT ILLNESS:      25 y/o F with PMH of gastric sleeve in September 2019, known gall stones presents to the ED with cc of R sided upper back pain with an onset of 1 day  Pain radiates to the RUQ  No relation of pain to food intake, and improved with hot showers, and with Oxycodone  She reports nausea, but denies any vomiting  She denies any recent fever, chills, vomiting, diarrhea, CP, SOB, changes in urinary habits, or bowel habits  This is her third episode of similar pain/ biliary colic since her gastric sleeve - One episode each in Feb and July 2020  She was scheduled for lap cholecystectomy in July 2020, but it was  Postponed as her pre procedure COVID 19 was positive  Since then she has been in contact with PCP to repeat COVID testing to have the procedure done, but unfortunately developed recurrent abdominal pain 1 day ago   She has lost 150 pounds in  approx 6-7 months since the gastric sleeve  She reports being on a low fat diet / bariatric surgery diet  She denies alcohol use or use of any new medications  Denies any recent travels or known sick contacts  No change in color of urine, stool, rash, or generalized itching, or changes in skin color  She was hospitalized for further eval with Gen Surgery on consult  Labs showed elevated and uptrending AST/ALT  382/294 , lipase within normal limit, worsening T  Bili of 1 07 from 0 77  No leukocytosis or fevers noted since admission  US RUQ showed '' Cholelithiasis with gallbladder distention, diffuse wall thickening, and trace pericholecystic fluid, which may be due to acute cholecystitis in the appropriate clinical setting  Dilatation of the common bile duct with findings suspicious for choledocholithiasis  ''  GI is consulted for same    REVIEW OF SYSTEMS:    CONSTITUTIONAL: Denies any fever, chills, rigors,  Positive for weight loss since gastric sleeve surgery in 2019  CARDIOVASCULAR: No chest pain or palpitations   RESPIRATORY: Denies any cough, hemoptysis, shortness of breath or dyspnea on exertion  GASTROINTESTINAL:  RUQ abdominal pain     GENITOURINARY: No problems with urination, denies any hematuria or dysuria  NEUROLOGIC: No dizziness or vertigo, denies headaches   MUSCULOSKELETAL: Denies any muscle or joint pain   SKIN: Denies skin rashes or itching   ENDOCRINE: Denies excessive thirst, denies intolerance to heat or cold  PSYCHOSOCIAL: Denies depression or anxiety, denies any recent memory loss     Historical Information   Past Medical History:   Diagnosis Date    Anemia     Asthma      Past Surgical History:   Procedure Laterality Date     SECTION      4 times    GASTRECTOMY SLEEVE LAPAROSCOPIC       Social History   Social History     Substance and Sexual Activity   Alcohol Use Yes    Comment: socially     Social History     Substance and Sexual Activity   Drug Use No     Social History     Tobacco Use   Smoking Status Never Smoker   Smokeless Tobacco Never Used     History reviewed  No pertinent family history  Meds/Allergies   Medications Prior to Admission   Medication    acetaminophen (TYLENOL) 325 mg tablet    albuterol (PROVENTIL HFA,VENTOLIN HFA) 90 mcg/act inhaler    ferrous sulfate 325 (65 Fe) mg tablet     Current Facility-Administered Medications   Medication Dose Route Frequency    ceFAZolin (ANCEF) IVPB (premix in dextrose) 2,000 mg 50 mL  2,000 mg Intravenous On Call To OR    heparin (porcine) subcutaneous injection 5,000 Units  5,000 Units Subcutaneous Q8H [address]    HYDROmorphone (DILAUDID) injection 0 2 mg  0 2 mg Intravenous Q3H PRN    lactated ringers infusion  125 mL/hr Intravenous Continuous    ondansetron (ZOFRAN) injection 4 mg  4 mg Intravenous Q6H PRN    oxyCODONE (ROXICODONE) immediate release tablet 10 mg  10 mg Oral Q4H PRN    oxyCODONE (ROXICODONE) IR tablet 5 mg  5 mg Oral Q4H PRN    potassium chloride 20 mEq IVPB (premix)  20 mEq Intravenous Q2H     No Known Allergies      PHYSICAL EXAM:      Objective   Blood pressure (!) 83/54, pulse 63, temperature 98 3 °F (36 8 °C), resp  rate 18, weight 68 kg (150 lb), last menstrual period 09/18/2020, SpO2 98 %  Body mass index is 26 57 kg/m²  Intake/Output Summary (Last 24 hours) at 9/21/2020 1345  Last data filed at 9/21/2020 1127  Gross per 24 hour   Intake 450 ml   Output --   Net 450 ml       General Appearance:   Alert, cooperative, no distress   HEENT:   Normocephalic, atraumatic, anicteric        Lungs:   CTA bilaterally, non laboured   Heart:   Regular rate and rhythm; no murmur, rub, or gallop   Abdomen:   Soft, tenderness to palpation in RUQ and epigastric regions  Prior lap scars noted  No guarding or rigidity              Extremities:   No cyanosis, clubbing or edema    Pulses:   2+ and symmetric all extremities    Skin:   No jaundice, rashes, or lesions    Lymph Nodes:   No palpable cervical lymphadenopathy        LAB RESULTS: Admission on 09/21/2020   Component Date Value    WBC 09/21/2020 4 61     RBC 09/21/2020 3 87     Hemoglobin 09/21/2020 8 3*    Hematocrit 09/21/2020 29 3*    MCV 09/21/2020 76*    MCH 09/21/2020 21 4*    MCHC 09/21/2020 28 3*    RDW 09/21/2020 16 7*    MPV 09/21/2020 10 0     Platelets 09/20/4815 355     nRBC 09/21/2020 0     Neutrophils Relative 09/21/2020 56     Immat GRANS % 09/21/2020 0     Lymphocytes Relative 09/21/2020 36     Monocytes Relative 09/21/2020 7     Eosinophils Relative 09/21/2020 1     Basophils Relative 09/21/2020 0     Neutrophils Absolute 09/21/2020 2 54     Immature Grans Absolute 09/21/2020 0 01     Lymphocytes Absolute 09/21/2020 1 67     Monocytes Absolute 09/21/2020 0 32     Eosinophils Absolute 09/21/2020 0 05     Basophils Absolute 09/21/2020 0 02     Sodium 09/21/2020 142     Potassium 09/21/2020 3 4*    Chloride 09/21/2020 109*    CO2 09/21/2020 26     ANION GAP 09/21/2020 7     BUN 09/21/2020 10     Creatinine 09/21/2020 0 58*    Glucose 09/21/2020 91     Calcium 09/21/2020 9 0     AST 09/21/2020 295*    ALT 09/21/2020 261*    Alkaline Phosphatase 09/21/2020 76     Total Protein 09/21/2020 7 9     Albumin 09/21/2020 4 1     Total Bilirubin 09/21/2020 0 77     eGFR 09/21/2020 124     Lipase 09/21/2020 174     GGT 09/21/2020 92*    Platelets 77/83/8843 279     MPV 09/21/2020 10 5     Sodium 09/21/2020 141     Potassium 09/21/2020 3 6     Chloride 09/21/2020 111*    CO2 09/21/2020 25     ANION GAP 09/21/2020 5     BUN 09/21/2020 9     Creatinine 09/21/2020 0 44*    Glucose 09/21/2020 88     Calcium 09/21/2020 8 6     AST 09/21/2020 382*    ALT 09/21/2020 294*    Alkaline Phosphatase 09/21/2020 83     Total Protein 09/21/2020 7 2     Albumin 09/21/2020 3 6     Total Bilirubin 09/21/2020 1 07*    eGFR 09/21/2020 136     WBC 09/21/2020 3 40*    RBC 09/21/2020 3 69*    Hemoglobin 09/21/2020 7 7*    Hematocrit 09/21/2020 28 2*    MCV 09/21/2020 76*    MCH 09/21/2020 20 9*    MCHC 09/21/2020 27 3*    RDW 09/21/2020 16 8*    MPV 09/21/2020 10 2     Platelets 59/99/9053 276     nRBC 09/21/2020 0     Neutrophils Relative 09/21/2020 69     Immat GRANS % 09/21/2020 0     Lymphocytes Relative 09/21/2020 24     Monocytes Relative 09/21/2020 6     Eosinophils Relative 09/21/2020 1     Basophils Relative 09/21/2020 0     Neutrophils Absolute 09/21/2020 2 34     Immature Grans Absolute 09/21/2020 0 01     Lymphocytes Absolute 09/21/2020 0 83     Monocytes Absolute 09/21/2020 0 19     Eosinophils Absolute 09/21/2020 0 02     Basophils Absolute 09/21/2020 0 01     Preg, Serum 09/21/2020 Negative        RADIOLOGY RESULTS: I have personally reviewed pertinent imaging studies

## 2020-09-21 NOTE — H&P
H&P Exam - General Surgery   Lexis Dennis 26 y o  female MRN: [id number]  Unit/Bed#: ED 23 Encounter: [id number]    Assessment/Plan     Assessment:  62-year-old female with history of gallstones presents with symptomatic cholelithiasis    No leukocytosis  Mild AST/ALT elevation (295/261), T bili within normal limits    Plan:  No signs of acute cholecystitis on bedside gallbladder ultrasound  Patient is minimally tender in the right upper quadrant with a negative Pascal sign  She also has no leukocytosis and has been afebrile  Symptoms are likely secondary to symptomatic cholelithiasis  Admit to acute care surgery service  Laparoscopic cholecystectomy later today versus tomorrow pending OR availability  NPO  [email or web address]  Will obtain formal right upper quadrant ultrasound to evaluate bile ducts given transaminitis  Prn analgesia/anti-emetics  DVT ppx      History of Present Illness     HPI:  Lexis Dennis is a 26 y o  female with past surgical history of a laparoscopic gastric sleeve approximately 1 year ago, who presents with right mid back and right upper quadrant abdominal pain since 5:00 a m  In the morning yesterday  Patient states she has had similar episodes in the past, but they have typically resolved on their own  Describes the pain as a sharp colicky type pain that comes and goes  She was admitted here in July for the same but on preop testing tested positive for COVID-19 so the surgery was put off  She was advised to follow-up in the office after she recovered to schedule for laparoscopic cholecystectomy electively  This current episode started at [facility] yesterday and was associated with nausea without vomiting  Pain persisted throughout the day which prompted her visit to the ED this evening  Evaluation in the ED showed bedside US with cholelithiasis without gallbladder wall thickening or pericholecystic fluid  Review of Systems   Constitutional: Negative for chills, fatigue and fever  HENT: Negative  Eyes: Negative  Respiratory: Negative for cough, chest tightness and shortness of breath  Cardiovascular: Negative for chest pain and palpitations  Gastrointestinal: Positive for abdominal pain, nausea and vomiting  Negative for blood in stool, constipation and diarrhea  Endocrine: Negative  Genitourinary: Negative  Negative for difficulty urinating  Musculoskeletal: Negative  Negative for arthralgias  Skin: Negative  Negative for rash  Allergic/Immunologic: Negative  Neurological: Negative  Negative for dizziness and light-headedness  Hematological: Negative  Psychiatric/Behavioral: Negative  Historical Information   Past Medical History:   Diagnosis Date    Asthma      History reviewed  No pertinent surgical history  Social History   Social History     Substance and Sexual Activity   Alcohol Use Yes    Comment: socially     Social History     Substance and Sexual Activity   Drug Use No     Social History     Tobacco Use   Smoking Status Never Smoker   Smokeless Tobacco Never Used     E-Cigarette/Vaping    E-Cigarette Use Never User      E-Cigarette/Vaping Substances     Family History: non-contributory    Meds/Allergies   PTA meds:   Prior to Admission Medications   Prescriptions Last Dose Informant Patient Reported?  Taking?   acetaminophen (TYLENOL) 325 mg tablet   No No   Sig: Take 2 tablets (650 mg total) by mouth every 6 (six) hours as needed for mild pain   albuterol (PROVENTIL HFA,VENTOLIN HFA) 90 mcg/act inhaler  Self Yes No   Sig: Inhale 2 puffs every 6 (six) hours as needed for shortness of breath   ferrous sulfate 325 (65 Fe) mg tablet   No No   Sig: Take 1 tablet (325 mg total) by mouth daily with breakfast      Facility-Administered Medications: None     No Known Allergies    Objective   First Vitals:   Blood Pressure: 118/60 (09/21/20 0100)  Pulse: 101 (09/21/20 0100)  Temperature: 97 8 °F (36 6 °C) (09/21/20 0100)  Temp Source: Oral (09/21/20 0100)  Respirations: 18 (09/21/20 0100)  Weight - Scale: 68 kg (150 lb) (09/21/20 0100)  SpO2: 98 % (09/21/20 0100)    Current Vitals:   Blood Pressure: 118/60 (09/21/20 0100)  Pulse: 101 (09/21/20 0100)  Temperature: 97 8 °F (36 6 °C) (09/21/20 0100)  Temp Source: Oral (09/21/20 0100)  Respirations: 18 (09/21/20 0100)  Weight - Scale: 68 kg (150 lb) (09/21/20 0100)  SpO2: 98 % (09/21/20 0100)    No intake or output data in the 24 hours ending 09/21/20 0227    Invasive Devices     Peripheral Intravenous Line            Peripheral IV 09/21/20 Left Antecubital less than 1 day                Physical Exam  Vitals signs and nursing note reviewed  Constitutional:       General: She is not in acute distress  Appearance: She is well-developed  She is not diaphoretic  HENT:      Head: Normocephalic and atraumatic  Eyes:      Conjunctiva/sclera: Conjunctivae normal       Pupils: Pupils are equal, round, and reactive to light  Neck:      Musculoskeletal: Normal range of motion  Cardiovascular:      Rate and Rhythm: Normal rate and regular rhythm  Heart sounds: Normal heart sounds  Pulmonary:      Effort: Pulmonary effort is normal  No respiratory distress  Abdominal:      General: There is no distension  Palpations: Abdomen is soft  Tenderness: There is no rebound  Comments: Minimal RUQ tenderness to deep palpation  Negative Pascal's Sign   Musculoskeletal: Normal range of motion  General: No deformity  Skin:     General: Skin is warm and dry  Neurological:      Mental Status: She is alert and oriented to person, place, and time  Cranial Nerves: No cranial nerve deficit     Psychiatric:         Behavior: Behavior normal          Lab Results:   CBC:   Lab Results   Component Value Date    WBC 4 61 09/21/2020    HGB 8 3 (L) 09/21/2020    HCT 29 3 (L) 09/21/2020    MCV 76 (L) 09/21/2020     09/21/2020    MCH 21 4 (L) 09/21/2020    MCHC 28 3 (L) 09/21/2020 RDW 16 7 (H) 09/21/2020    MPV 10 0 09/21/2020    NRBC 0 09/21/2020   , CMP:   Lab Results   Component Value Date    SODIUM 142 09/21/2020    K 3 4 (L) 09/21/2020     (H) 09/21/2020    CO2 26 09/21/2020    BUN 10 09/21/2020    CREATININE 0 58 (L) 09/21/2020    CALCIUM 9 0 09/21/2020     (H) 09/21/2020     (H) 09/21/2020    ALKPHOS 76 09/21/2020    EGFR 124 09/21/2020   , Coagulation: No results found for: PT, INR, APTT  Imaging: I have personally reviewed pertinent reports  EKG, Pathology, and Other Studies: I have personally reviewed pertinent reports  Code Status: Prior  Advance Directive and Living Will:      Power of :    POLST:      Counseling / Coordination of Care  Total floor / unit time spent today 20 minutes  Greater than 50% of total time was spent with the patient and / or family counseling and / or coordination of care    A description of the counseling / coordination of care:  Discussion with patient and surgical team

## 2020-09-21 NOTE — ED ATTENDING ATTESTATION
9/21/2020  IJcarlos MD, saw and evaluated the patient  I have discussed the patient with the resident/non-physician practitioner and agree with the resident's/non-physician practitioner's findings, Plan of Care, and MDM as documented in the resident's/non-physician practitioner's note, except where noted  All available labs and Radiology studies were reviewed  I was present for key portions of any procedure(s) performed by the resident/non-physician practitioner and I was immediately available to provide assistance  At this point I agree with the current assessment done in the Emergency Department  I have conducted an independent evaluation of this patient a history and physical is as follows:    ED Course     Emergency Department Note- Sonny Coburn 26 y o  female MRN: [id number]    Unit/Bed#: ED 23 Encounter: [id number]    Sonny Coburn is a 26 y o  female who presents with   Chief Complaint   Patient presents with    Abdominal Pain     "i'm having a gallbladder attack", took an oxycodone and now reports "my gallbladder is swollen" pain increased today, nausea no vomiting, no diarrhea, no urinary distress         History of Present Illness   HPI:  Sonny Coburn is a 26 y o  female who presents for evaluation of:  Gallbladder today  Patient developed a recrudescence of RUQ pain this morning  Patient took an oxycodone at 11 am and then felt better  RUQ pain is typical of GS pain  Heat therapy did not affect the pain  Patient's pain recurred this afternoon and worsened tonight  Patient had gastric sleeve 9/19 and has done well since  Review of EMR: GS Dx 2/4/20 and also on 7/21/20; patient was not an operative candidate because she had covid 19  Patient's last menstrual period was 09/18/2020  Review of Systems   Constitutional: Negative for chills and fever  HENT: Negative for congestion and rhinorrhea  Respiratory: Negative for cough and shortness of breath  Cardiovascular: Negative for chest pain and palpitations  Neurological: Negative for light-headedness and headaches  All other systems reviewed and are negative  Historical Information   Past Medical History:   Diagnosis Date    Asthma      History reviewed  No pertinent surgical history  Social History   Social History     Substance and Sexual Activity   Alcohol Use Yes    Comment: socially     Social History     Substance and Sexual Activity   Drug Use No     Social History     Tobacco Use   Smoking Status Never Smoker   Smokeless Tobacco Never Used     Family History: non-contributory    Meds/Allergies   all medications and allergies reviewed  No Known Allergies    Objective   First Vitals:   Blood Pressure: 118/60 (09/21/20 0100)  Pulse: 101 (09/21/20 0100)  Temperature: 97 8 °F (36 6 °C) (09/21/20 0100)  Temp Source: Oral (09/21/20 0100)  Respirations: 18 (09/21/20 0100)  Weight - Scale: 68 kg (150 lb) (09/21/20 0100)  SpO2: 98 % (09/21/20 0100)    Current Vitals:   Blood Pressure: 118/60 (09/21/20 0100)  Pulse: 101 (09/21/20 0100)  Temperature: 97 8 °F (36 6 °C) (09/21/20 0100)  Temp Source: Oral (09/21/20 0100)  Respirations: 18 (09/21/20 0100)  Weight - Scale: 68 kg (150 lb) (09/21/20 0100)  SpO2: 98 % (09/21/20 0100)    No intake or output data in the 24 hours ending 09/21/20 0144    Invasive Devices     Peripheral Intravenous Line            Peripheral IV 09/21/20 Left Antecubital less than 1 day                Physical Exam  Vitals signs and nursing note reviewed  Constitutional:       Appearance: She is well-developed  HENT:      Head: Normocephalic and atraumatic  Cardiovascular:      Rate and Rhythm: Normal rate and regular rhythm  Heart sounds: Normal heart sounds  Pulmonary:      Effort: Pulmonary effort is normal  No respiratory distress  Abdominal:      General: Bowel sounds are normal       Palpations: Abdomen is soft  Skin:     General: Skin is warm and dry  Capillary Refill: Capillary refill takes less than 2 seconds  Neurological:      General: No focal deficit present  Mental Status: She is alert and oriented to person, place, and time  Psychiatric:         Mood and Affect: Mood normal          Behavior: Behavior normal            Medical Decision Makin   Recurrent RUQ abdominal pain: likely recurrent cholelithiasis; CMP and GGT r/o cholecystitis    Recent Results (from the past 36 hour(s))   CBC and differential    Collection Time: 20  1:08 AM   Result Value Ref Range    WBC 4 61 4 31 - 10 16 Thousand/uL    RBC 3 87 3 81 - 5 12 Million/uL    Hemoglobin 8 3 (L) 11 5 - 15 4 g/dL    Hematocrit 29 3 (L) 34 8 - 46 1 %    MCV 76 (L) 82 - 98 fL    MCH 21 4 (L) 26 8 - 34 3 pg    MCHC 28 3 (L) 31 4 - 37 4 g/dL    RDW 16 7 (H) 11 6 - 15 1 %    MPV 10 0 8 9 - 12 7 fL    Platelets 012 900 - 088 Thousands/uL    nRBC 0 /100 WBCs    Neutrophils Relative 56 43 - 75 %    Immat GRANS % 0 0 - 2 %    Lymphocytes Relative 36 14 - 44 %    Monocytes Relative 7 4 - 12 %    Eosinophils Relative 1 0 - 6 %    Basophils Relative 0 0 - 1 %    Neutrophils Absolute 2 54 1 85 - 7 62 Thousands/µL    Immature Grans Absolute 0 01 0 00 - 0 20 Thousand/uL    Lymphocytes Absolute 1 67 0 60 - 4 47 Thousands/µL    Monocytes Absolute 0 32 0 17 - 1 22 Thousand/µL    Eosinophils Absolute 0 05 0 00 - 0 61 Thousand/µL    Basophils Absolute 0 02 0 00 - 0 10 Thousands/µL   Comprehensive metabolic panel    Collection Time: 20  1:08 AM   Result Value Ref Range    Sodium 142 136 - 145 mmol/L    Potassium 3 4 (L) 3 5 - 5 3 mmol/L    Chloride 109 (H) 100 - 108 mmol/L    CO2 26 21 - 32 mmol/L    ANION GAP 7 4 - 13 mmol/L    BUN 10 5 - 25 mg/dL    Creatinine 0 58 (L) 0 60 - 1 30 mg/dL    Glucose 91 65 - 140 mg/dL    Calcium 9 0 8 3 - 10 1 mg/dL     (H) 5 - 45 U/L     (H) 12 - 78 U/L    Alkaline Phosphatase 76 46 - 116 U/L    Total Protein 7 9 6 4 - 8 2 g/dL    Albumin 4 1 3 5 - 5 0 g/dL    Total Bilirubin 0 77 0 20 - 1 00 mg/dL    eGFR 124 ml/min/1 73sq m   Lipase    Collection Time: 09/21/20  1:08 AM   Result Value Ref Range    Lipase 174 73 - 393 u/L     No orders to display         Portions of the record may have been created with voice recognition software  Occasional wrong word or "sound a like" substitutions may have occurred due to the inherent limitations of voice recognition software  Read the chart carefully and recognize, using context, where substitutions have occurred          Critical Care Time  Procedures

## 2020-09-21 NOTE — ED PROVIDER NOTES
History  Chief Complaint   Patient presents with    Abdominal Pain     "i'm having a gallbladder attack", took an oxycodone and now reports "my gallbladder is swollen" pain increased today, nausea no vomiting, no diarrhea, no urinary distress     HPI  27 yo female with PMH gallstones presents to the ED with concern for RUQ pain  Pt reports pain started at approximately 0500 yesterday morning, improved during the day, and then returned at 2000 last night  Pt describes constant stabbing pain radiating from RUQ to mid back  Reports associated nausea, but no vomiting, diarrhea, urinary sxs, or fever  States this feels similar to previous "gallbladder attacks" she has had  Pt cannot identify any dietary triggers  Most recent episode prior to this was in 2020, she was admitted with plan for cholecystectomy but was COVID positive so procedure was deferred at that time  Hx gastric sleeve surgery 2 months ago  Prior to Admission Medications   Prescriptions Last Dose Informant Patient Reported? Taking?   acetaminophen (TYLENOL) 325 mg tablet   No No   Sig: Take 2 tablets (650 mg total) by mouth every 6 (six) hours as needed for mild pain   albuterol (PROVENTIL HFA,VENTOLIN HFA) 90 mcg/act inhaler  Self Yes No   Sig: Inhale 2 puffs every 6 (six) hours as needed for shortness of breath   ferrous sulfate 325 (65 Fe) mg tablet   No No   Sig: Take 1 tablet (325 mg total) by mouth daily with breakfast      Facility-Administered Medications: None       Past Medical History:   Diagnosis Date    Anemia     Asthma        Past Surgical History:   Procedure Laterality Date     SECTION      4 times    GASTRECTOMY SLEEVE LAPAROSCOPIC         History reviewed  No pertinent family history  I have reviewed and agree with the history as documented      E-Cigarette/Vaping    E-Cigarette Use Never User      E-Cigarette/Vaping Substances     Social History     Tobacco Use    Smoking status: Never Smoker    Smokeless tobacco: Never Used   Substance Use Topics    Alcohol use: Yes     Comment: socially    Drug use: No        Review of Systems   Constitutional: Negative for chills and fever  HENT: Negative for congestion, rhinorrhea and sore throat  Respiratory: Negative for chest tightness and shortness of breath  Cardiovascular: Negative for chest pain  Gastrointestinal: Positive for abdominal pain and nausea  Negative for diarrhea and vomiting  Genitourinary: Negative for dysuria and hematuria  Musculoskeletal: Positive for back pain  Negative for arthralgias and myalgias  Skin: Negative for rash  Neurological: Negative for dizziness, syncope, weakness, light-headedness, numbness and headaches  All other systems reviewed and are negative  Physical Exam  ED Triage Vitals   Temperature Pulse Respirations Blood Pressure SpO2   09/21/20 0100 09/21/20 0100 09/21/20 0100 09/21/20 0100 09/21/20 0100   97 8 °F (36 6 °C) 101 18 118/60 98 %      Temp Source Heart Rate Source Patient Position - Orthostatic VS BP Location FiO2 (%)   09/21/20 0100 09/21/20 0100 09/21/20 0100 09/21/20 0100 --   Oral Monitor Sitting Left arm       Pain Score       09/21/20 0112       Worst Possible Pain             Orthostatic Vital Signs  Vitals:    09/21/20 0100 09/21/20 0307 09/21/20 0307   BP: 118/60 104/69 104/69   Pulse: 101  81   Patient Position - Orthostatic VS: Sitting         Physical Exam  Vitals signs and nursing note reviewed  Constitutional:       General: She is in acute distress  Appearance: She is well-developed  She is not diaphoretic  HENT:      Head: Normocephalic and atraumatic  Right Ear: External ear normal       Left Ear: External ear normal       Nose: Nose normal    Eyes:      Conjunctiva/sclera: Conjunctivae normal       Pupils: Pupils are equal, round, and reactive to light  Neck:      Musculoskeletal: Normal range of motion and neck supple     Cardiovascular:      Rate and Rhythm: Normal rate and regular rhythm  Heart sounds: Normal heart sounds  No murmur  No friction rub  No gallop  Pulmonary:      Effort: Pulmonary effort is normal  No respiratory distress  Breath sounds: Normal breath sounds  No wheezing, rhonchi or rales  Abdominal:      General: Bowel sounds are normal  There is no distension  Palpations: Abdomen is soft  There is no mass  Tenderness: There is abdominal tenderness in the right upper quadrant and epigastric area  There is no right CVA tenderness, left CVA tenderness, guarding or rebound  Positive signs include Pascal's sign  Musculoskeletal: Normal range of motion  Lymphadenopathy:      Cervical: No cervical adenopathy  Skin:     General: Skin is warm and dry  Neurological:      Mental Status: She is alert and oriented to person, place, and time  Cranial Nerves: No cranial nerve deficit  Sensory: No sensory deficit           ED Medications  Medications   lactated ringers infusion (has no administration in time range)   heparin (porcine) subcutaneous injection 5,000 Units (has no administration in time range)   ondansetron (ZOFRAN) injection 4 mg (has no administration in time range)   oxyCODONE (ROXICODONE) IR tablet 5 mg (has no administration in time range)   oxyCODONE (ROXICODONE) immediate release tablet 10 mg (has no administration in time range)   HYDROmorphone (DILAUDID) injection 0 2 mg (has no administration in time range)   sodium chloride 0 9 % bolus 1,000 mL (1,000 mL Intravenous New Bag 9/21/20 0111)   ondansetron (ZOFRAN) injection 4 mg (4 mg Intravenous Given 9/21/20 0112)   HYDROmorphone (DILAUDID) injection 0 5 mg (0 5 mg Intravenous Given 9/21/20 0112)       Diagnostic Studies  Results Reviewed     Procedure Component Value Units Date/Time    Comprehensive metabolic panel [[id number]]     Lab Status:  No result Specimen:  Blood     CBC and differential [[id number]]     Lab Status:  No result Specimen:  Blood     Platelet count [[id number]]     Lab Status:  No result Specimen:  Blood     Gamma GT [[id number]]  (Abnormal) Collected:  09/21/20 0108    Lab Status:  Final result Specimen:  Blood from Arm, Left Updated:  09/21/20 0145     GGT 92 U/L     Comprehensive metabolic panel [[id number]]  (Abnormal) Collected:  09/21/20 0108    Lab Status:  Final result Specimen:  Blood from Arm, Left Updated:  09/21/20 0132     Sodium 142 mmol/L      Potassium 3 4 mmol/L      Chloride 109 mmol/L      CO2 26 mmol/L      ANION GAP 7 mmol/L      BUN 10 mg/dL      Creatinine 0 58 mg/dL      Glucose 91 mg/dL      Calcium 9 0 mg/dL       U/L       U/L      Alkaline Phosphatase 76 U/L      Total Protein 7 9 g/dL      Albumin 4 1 g/dL      Total Bilirubin 0 77 mg/dL      eGFR 124 ml/min/1 73sq m     Narrative:       [place] guidelines for Chronic Kidney Disease (CKD):     Stage 1 with normal or high GFR (GFR > 90 mL/min/1 73 square meters)    Stage 2 Mild CKD (GFR = 60-89 mL/min/1 73 square meters)    Stage 3A Moderate CKD (GFR = 45-59 mL/min/1 73 square meters)    Stage 3B Moderate CKD (GFR = 30-44 mL/min/1 73 square meters)    Stage 4 Severe CKD (GFR = 15-29 mL/min/1 73 square meters)    Stage 5 End Stage CKD (GFR <15 mL/min/1 73 square meters)  Note: GFR calculation is accurate only with a steady state creatinine    Lipase [[id number]]  (Normal) Collected:  09/21/20 0108    Lab Status:  Final result Specimen:  Blood from Arm, Left Updated:  09/21/20 0132     Lipase 174 u/L     CBC and differential [[id number]]  (Abnormal) Collected:  09/21/20 0108    Lab Status:  Final result Specimen:  Blood from Arm, Left Updated:  09/21/20 0115     WBC 4 61 Thousand/uL      RBC 3 87 Million/uL      Hemoglobin 8 3 g/dL      Hematocrit 29 3 %      MCV 76 fL      MCH 21 4 pg      MCHC 28 3 g/dL      RDW 16 7 %      MPV 10 0 fL      Platelets 498 Thousands/uL      nRBC 0 /100 WBCs      Neutrophils Relative 56 %      Immat GRANS % 0 %      Lymphocytes Relative 36 %      Monocytes Relative 7 %      Eosinophils Relative 1 %      Basophils Relative 0 %      Neutrophils Absolute 2 54 Thousands/µL      Immature Grans Absolute 0 01 Thousand/uL      Lymphocytes Absolute 1 67 Thousands/µL      Monocytes Absolute 0 32 Thousand/µL      Eosinophils Absolute 0 05 Thousand/µL      Basophils Absolute 0 02 Thousands/µL     POCT urinalysis dipstick [[id number]]     Lab Status:  No result     POCT pregnancy, urine [[id number]]     Lab Status:  No result                  US right upper quadrant    (Results Pending)         Procedures  POC Biliary US    Date/Time: 9/21/2020 1:59 AM  Performed by: Cali Edgar MD  Authorized by: Cali Edgar MD     Patient location:  ED  Performed by:  Resident  Procedure details:     Exam Type:  Diagnostic    Indications: upper right quadrant abdominal pain and back pain      Assessment for:  Cholecystitis and cholelithiasis    Views obtained: gallbladder (transverse and longitudinal)      Image quality: diagnostic      Image availability:  Images available in PACS  Findings:     Cholelithiasis: identified      Common bile duct:  Unable to visualize    Gallbladder wall:  Normal    Gallbladder wall thickness (mm):  0    Pericholecystic fluid: not identified      Sonographic Pascal's sign: negative    Interpretation:     Biliary ultrasound impressions: cholelithiasis            ED Course  ED Course as of Sep 21 0437   Mon Sep 21, 2020   0136 AST(!): 295   0136 ALT(!): 261   0152 Multiple gallstones/sludge with acoustic shadowing visualized on bedside US  No wall thickening appreciated  Will consult surgery      0202 Improved from baseline   Hemoglobin(!): 8 3   0208 Surgery requesting formal RUQ ultrasound  Will see if on call Aicha is available to come in        [address] will not come in, as choledocholithiasis is not an indication for emergent ultrasound   Will update surgical team MDM  27 yo female with PMH gallstones and gastric sleeve surgery presenting with RUQ pain  Concerning for cholelithiasis/biliary colic  Lower concern for cholecystitis, but will evaluate with CBC, CMP, Lipase, Bedside US, and UA  Pt will likely require surgical consult and possible admission for cholecystectomy  Will treat with IV fluids, zofran, and dilaudid  Disposition  Final diagnoses:   Symptomatic cholelithiasis     Time reflects when diagnosis was documented in both MDM as applicable and the Disposition within this note     Time User Action Codes Description Comment    9/21/2020  2:41 AM Maria De Jesus Short Add [K80 20] Symptomatic cholelithiasis       ED Disposition     ED Disposition Condition Date/Time Comment    Admit Stable Mon Sep 21, 2020  4:36 AM Case was discussed with surgery resident Dr Schuyler Ernst and the patient's admission status was agreed to be Admission Status: inpatient status to the service of Dr Kilo Higgins   Follow-up Information    None         Current Discharge Medication List      CONTINUE these medications which have NOT CHANGED    Details   acetaminophen (TYLENOL) 325 mg tablet Take 2 tablets (650 mg total) by mouth every 6 (six) hours as needed for mild pain  Qty: 30 tablet, Refills: 0    Associated Diagnoses: Choledocholithiasis      albuterol (PROVENTIL HFA,VENTOLIN HFA) 90 mcg/act inhaler Inhale 2 puffs every 6 (six) hours as needed for shortness of breath    Comments: Substitution to a formulary equivalent within the same pharmaceutical class is authorized  ferrous sulfate 325 (65 Fe) mg tablet Take 1 tablet (325 mg total) by mouth daily with breakfast  Qty: 30 tablet, Refills: 0    Associated Diagnoses: Choledocholithiasis           No discharge procedures on file  PDMP Review     None           ED Provider  Attending physically available and evaluated Victor Manuel Ko I managed the patient along with the ED Attending      Electronically Signed by         Randi Gutierrez MD  09/21/20 7269

## 2020-09-21 NOTE — CASE MANAGEMENT
LOS 1 Day No bundle    Lives 2 story house [address] with  and 2 children  Has nebulizer    Drives  Independent  Uses [facility] [address] [facility]    Can afford medications  Denies MH Hx  Denies substance Hx  No LW No POA   Emergency contact is her  Blanca Jerry [phone]     will drive home  No Hx of VNA    CM reviewed d/c planning process including the following: identifying help at home, patient preference for d/c planning needs, Discharge Lounge, [facility] Meds to Bed program, availability of treatment team to discuss questions or concerns patient and/or family may have regarding understanding medications and recognizing signs and symptoms once discharged  CM also encouraged patient to follow up with all recommended appointments after discharge  Patient advised of importance for patient and family to participate in managing patients medical well being

## 2020-09-22 ENCOUNTER — ANESTHESIA (INPATIENT)
Dept: GASTROENTEROLOGY | Facility: HOSPITAL | Age: 30
DRG: 263 | End: 2020-09-22
Payer: COMMERCIAL

## 2020-09-22 ENCOUNTER — HOSPITAL ENCOUNTER (OUTPATIENT)
Dept: RADIOLOGY | Facility: HOSPITAL | Age: 30
Discharge: HOME | DRG: 263 | End: 2020-09-22
Payer: COMMERCIAL

## 2020-09-22 ENCOUNTER — ANESTHESIA EVENT (INPATIENT)
Dept: GASTROENTEROLOGY | Facility: HOSPITAL | Age: 30
DRG: 263 | End: 2020-09-22
Payer: COMMERCIAL

## 2020-09-22 ENCOUNTER — ANESTHESIA EVENT (INPATIENT)
Dept: PERIOP | Facility: HOSPITAL | Age: 30
DRG: 263 | End: 2020-09-22
Payer: COMMERCIAL

## 2020-09-22 ENCOUNTER — APPOINTMENT (INPATIENT)
Dept: GASTROENTEROLOGY | Facility: HOSPITAL | Age: 30
DRG: 263 | End: 2020-09-22
Payer: COMMERCIAL

## 2020-09-22 VITALS — HEART RATE: 88 BPM

## 2020-09-22 PROBLEM — J45.909 ASTHMA: Status: ACTIVE | Noted: 2020-09-22

## 2020-09-22 LAB
ALBUMIN SERPL BCP-MCNC: 2.9 G/DL (ref 3.5–5)
ALP SERPL-CCNC: 73 U/L (ref 46–116)
ALT SERPL W P-5'-P-CCNC: 191 U/L (ref 12–78)
ANION GAP SERPL CALCULATED.3IONS-SCNC: 2 MMOL/L (ref 4–13)
AST SERPL W P-5'-P-CCNC: 144 U/L (ref 5–45)
BASOPHILS # BLD AUTO: 0.01 THOUSANDS/ΜL (ref 0–0.1)
BASOPHILS NFR BLD AUTO: 0 % (ref 0–1)
BILIRUB SERPL-MCNC: 0.37 MG/DL (ref 0.2–1)
BUN SERPL-MCNC: 7 MG/DL (ref 5–25)
CALCIUM ALBUM COR SERPL-MCNC: 9.2 MG/DL (ref 8.3–10.1)
CALCIUM SERPL-MCNC: 8.3 MG/DL (ref 8.3–10.1)
CHLORIDE SERPL-SCNC: 112 MMOL/L (ref 100–108)
CO2 SERPL-SCNC: 28 MMOL/L (ref 21–32)
CREAT SERPL-MCNC: 0.57 MG/DL (ref 0.6–1.3)
EOSINOPHIL # BLD AUTO: 0.06 THOUSAND/ΜL (ref 0–0.61)
EOSINOPHIL NFR BLD AUTO: 2 % (ref 0–6)
ERYTHROCYTE [DISTWIDTH] IN BLOOD BY AUTOMATED COUNT: 17 % (ref 11.6–15.1)
GFR SERPL CREATININE-BSD FRML MDRD: 125 ML/MIN/1.73SQ M
GLUCOSE SERPL-MCNC: 75 MG/DL (ref 65–140)
HCT VFR BLD AUTO: 25.3 % (ref 34.8–46.1)
HGB BLD-MCNC: 7.1 G/DL (ref 11.5–15.4)
IMM GRANULOCYTES # BLD AUTO: 0.01 THOUSAND/UL (ref 0–0.2)
IMM GRANULOCYTES NFR BLD AUTO: 0 % (ref 0–2)
LYMPHOCYTES # BLD AUTO: 1.79 THOUSANDS/ΜL (ref 0.6–4.47)
LYMPHOCYTES NFR BLD AUTO: 50 % (ref 14–44)
MCH RBC QN AUTO: 21.2 PG (ref 26.8–34.3)
MCHC RBC AUTO-ENTMCNC: 27.3 G/DL (ref 31.4–37.4)
MCV RBC AUTO: 78 FL (ref 82–98)
MONOCYTES # BLD AUTO: 0.36 THOUSAND/ΜL (ref 0.17–1.22)
MONOCYTES NFR BLD AUTO: 10 % (ref 4–12)
NEUTROPHILS # BLD AUTO: 1.36 THOUSANDS/ΜL (ref 1.85–7.62)
NEUTS SEG NFR BLD AUTO: 38 % (ref 43–75)
NRBC BLD AUTO-RTO: 0 /100 WBCS
PLATELET # BLD AUTO: 250 THOUSANDS/UL (ref 149–390)
PMV BLD AUTO: 10.7 FL (ref 8.9–12.7)
POTASSIUM SERPL-SCNC: 4 MMOL/L (ref 3.5–5.3)
PROT SERPL-MCNC: 5.7 G/DL (ref 6.4–8.2)
RBC # BLD AUTO: 3.25 MILLION/UL (ref 3.81–5.12)
SARS-COV-2 RNA RESP QL NAA+PROBE: NEGATIVE
SODIUM SERPL-SCNC: 142 MMOL/L (ref 136–145)
WBC # BLD AUTO: 3.59 THOUSAND/UL (ref 4.31–10.16)

## 2020-09-22 PROCEDURE — 0FC98ZZ EXTIRPATION OF MATTER FROM COMMON BILE DUCT, VIA NATURAL OR ARTIFICIAL OPENING ENDOSCOPIC: ICD-10-PCS | Performed by: STUDENT IN AN ORGANIZED HEALTH CARE EDUCATION/TRAINING PROGRAM

## 2020-09-22 PROCEDURE — U0003 INFECTIOUS AGENT DETECTION BY NUCLEIC ACID (DNA OR RNA); SEVERE ACUTE RESPIRATORY SYNDROME CORONAVIRUS 2 (SARS-COV-2) (CORONAVIRUS DISEASE [COVID-19]), AMPLIFIED PROBE TECHNIQUE, MAKING USE OF HIGH THROUGHPUT TECHNOLOGIES AS DESCRIBED BY CMS-2020-01-R: HCPCS | Performed by: PHYSICIAN ASSISTANT

## 2020-09-22 PROCEDURE — 85025 COMPLETE CBC W/AUTO DIFF WBC: CPT | Performed by: PHYSICIAN ASSISTANT

## 2020-09-22 PROCEDURE — 43262 ENDO CHOLANGIOPANCREATOGRAPH: CPT | Performed by: INTERNAL MEDICINE

## 2020-09-22 PROCEDURE — 0F7C8ZZ DILATION OF AMPULLA OF VATER, VIA NATURAL OR ARTIFICIAL OPENING ENDOSCOPIC: ICD-10-PCS | Performed by: STUDENT IN AN ORGANIZED HEALTH CARE EDUCATION/TRAINING PROGRAM

## 2020-09-22 PROCEDURE — 74328 X-RAY BILE DUCT ENDOSCOPY: CPT

## 2020-09-22 PROCEDURE — C1769 GUIDE WIRE: HCPCS

## 2020-09-22 PROCEDURE — 80053 COMPREHEN METABOLIC PANEL: CPT | Performed by: PHYSICIAN ASSISTANT

## 2020-09-22 PROCEDURE — 43264 ERCP REMOVE DUCT CALCULI: CPT | Performed by: INTERNAL MEDICINE

## 2020-09-22 PROCEDURE — NC001 PR NO CHARGE: Performed by: INTERNAL MEDICINE

## 2020-09-22 RX ORDER — FENTANYL CITRATE 50 UG/ML
INJECTION, SOLUTION INTRAMUSCULAR; INTRAVENOUS AS NEEDED
Status: DISCONTINUED | OUTPATIENT
Start: 2020-09-22 | End: 2020-09-22

## 2020-09-22 RX ORDER — ACETAMINOPHEN 325 MG/1
650 TABLET ORAL EVERY 4 HOURS PRN
Status: DISCONTINUED | OUTPATIENT
Start: 2020-09-22 | End: 2020-09-23

## 2020-09-22 RX ORDER — ONDANSETRON 2 MG/ML
INJECTION INTRAMUSCULAR; INTRAVENOUS AS NEEDED
Status: DISCONTINUED | OUTPATIENT
Start: 2020-09-22 | End: 2020-09-22

## 2020-09-22 RX ORDER — PROPOFOL 10 MG/ML
INJECTION, EMULSION INTRAVENOUS AS NEEDED
Status: DISCONTINUED | OUTPATIENT
Start: 2020-09-22 | End: 2020-09-22

## 2020-09-22 RX ORDER — LIDOCAINE HYDROCHLORIDE 20 MG/ML
INJECTION, SOLUTION EPIDURAL; INFILTRATION; INTRACAUDAL; PERINEURAL AS NEEDED
Status: DISCONTINUED | OUTPATIENT
Start: 2020-09-22 | End: 2020-09-22

## 2020-09-22 RX ORDER — GLYCOPYRROLATE 0.2 MG/ML
INJECTION INTRAMUSCULAR; INTRAVENOUS AS NEEDED
Status: DISCONTINUED | OUTPATIENT
Start: 2020-09-22 | End: 2020-09-22

## 2020-09-22 RX ORDER — PROPOFOL 10 MG/ML
INJECTION, EMULSION INTRAVENOUS CONTINUOUS PRN
Status: DISCONTINUED | OUTPATIENT
Start: 2020-09-22 | End: 2020-09-22

## 2020-09-22 RX ADMIN — IOHEXOL 9 ML: 240 INJECTION, SOLUTION INTRATHECAL; INTRAVASCULAR; INTRAVENOUS; ORAL at 14:30

## 2020-09-22 RX ADMIN — METRONIDAZOLE 500 MG: 500 INJECTION, SOLUTION INTRAVENOUS at 17:00

## 2020-09-22 RX ADMIN — FENTANYL CITRATE 25 MCG: 50 INJECTION, SOLUTION INTRAMUSCULAR; INTRAVENOUS at 14:45

## 2020-09-22 RX ADMIN — LIDOCAINE HYDROCHLORIDE 90 MG: 20 INJECTION, SOLUTION EPIDURAL; INFILTRATION; INTRACAUDAL; PERINEURAL at 14:32

## 2020-09-22 RX ADMIN — PROPOFOL 50 MG: 10 INJECTION, EMULSION INTRAVENOUS at 14:38

## 2020-09-22 RX ADMIN — CEFAZOLIN SODIUM 2000 MG: 2 SOLUTION INTRAVENOUS at 15:30

## 2020-09-22 RX ADMIN — CEFAZOLIN SODIUM 2000 MG: 2 SOLUTION INTRAVENOUS at 21:36

## 2020-09-22 RX ADMIN — METRONIDAZOLE 500 MG: 500 INJECTION, SOLUTION INTRAVENOUS at 23:34

## 2020-09-22 RX ADMIN — FENTANYL CITRATE 25 MCG: 50 INJECTION, SOLUTION INTRAMUSCULAR; INTRAVENOUS at 14:48

## 2020-09-22 RX ADMIN — ACETAMINOPHEN 650 MG: 325 TABLET, FILM COATED ORAL at 10:12

## 2020-09-22 RX ADMIN — PROPOFOL 50 MG: 10 INJECTION, EMULSION INTRAVENOUS at 14:35

## 2020-09-22 RX ADMIN — HEPARIN SODIUM 5000 UNITS: 5000 INJECTION INTRAVENOUS; SUBCUTANEOUS at 05:13

## 2020-09-22 RX ADMIN — CEFAZOLIN SODIUM 2000 MG: 2 SOLUTION INTRAVENOUS at 05:13

## 2020-09-22 RX ADMIN — INDOMETHACIN 100 MG: 50 SUPPOSITORY RECTAL at 14:48

## 2020-09-22 RX ADMIN — PROPOFOL 150 MCG/KG/MIN: 10 INJECTION, EMULSION INTRAVENOUS at 14:38

## 2020-09-22 RX ADMIN — PROPOFOL 50 MG: 10 INJECTION, EMULSION INTRAVENOUS at 14:32

## 2020-09-22 RX ADMIN — ONDANSETRON 4 MG: 2 INJECTION INTRAMUSCULAR; INTRAVENOUS at 21:38

## 2020-09-22 RX ADMIN — HEPARIN SODIUM 5000 UNITS: 5000 INJECTION INTRAVENOUS; SUBCUTANEOUS at 21:36

## 2020-09-22 RX ADMIN — METRONIDAZOLE 500 MG: 500 INJECTION, SOLUTION INTRAVENOUS at 05:14

## 2020-09-22 RX ADMIN — OXYCODONE HYDROCHLORIDE 5 MG: 5 TABLET ORAL at 19:43

## 2020-09-22 RX ADMIN — FENTANYL CITRATE 50 MCG: 50 INJECTION, SOLUTION INTRAMUSCULAR; INTRAVENOUS at 14:32

## 2020-09-22 RX ADMIN — GLYCOPYRROLATE 0.2 MG: 0.2 INJECTION, SOLUTION INTRAMUSCULAR; INTRAVENOUS at 14:32

## 2020-09-22 RX ADMIN — ONDANSETRON 4 MG: 2 INJECTION INTRAMUSCULAR; INTRAVENOUS at 14:28

## 2020-09-22 NOTE — UTILIZATION REVIEW
Notification of Inpatient Admission/Inpatient Authorization Request   This is a Notification of Inpatient Admission for [address] [facility]  Be advised that this patient was admitted to our facility under Inpatient Status  Contact Judy Naylor at [phone] for additional admission information  [facility]  DEDICATED -[phone]  Patient Name:   Ava Sam   YOB: 1990       [address]   [address]:   [place]  Tax ID: [id number]  NPI: [id number] Attending Provider/NPI:  Phone:  Address: Corazon Rodríguez [[id number]]   [phone]  Same as JIN/Thuy Torres [address] of Service Code: 24 Place of Service Name:  [address] [place] [address]   Start Date: 9/21/20 0234 Discharge Date & Time: No discharge date for patient encounter  Type of Admission: Inpatient Status Discharge Disposition (if discharged): Home/Self Care   Patient Diagnoses: Abdominal pain [R10 9]  Symptomatic cholelithiasis [K80 20]     Orders: Admission Orders (From admission, onward)     Ordered        09/21/20 0236  Inpatient Admission  Once                    Assigned Utilization Review Contact: Judy Naylor  Utilization   Network Utilization Review Department  Phone: [phone]; Fax [phone]  Email: [email or web address]   ATTENTION PAYERS: Please call the assigned Utilization  directly with any questions or concerns ALL voicemails in the department are confidential  Send all requests for admission clinical reviews, approved or denied determinations and any other requests to dedicated fax number belonging to the campus where the patient is receiving treatment

## 2020-09-22 NOTE — PROGRESS NOTES
Progress Note - General Surgery   Hazeline Klinefelter 26 y o  female MRN: [id number]  Unit/Bed#: [facility] 823-01 Encounter: [id number]    Assessment:  27 yo female with choledocholithiasis on MRCP yesterday, doing well  Plan:  ERCP  today with GI  Lap salina planning after ERCP  NPO    Continue IVF    Continue IV ancef and flagyl    Continue analgesia    Continue DVT ppx    Subjective/Objective   Chief Complaint: Abdominal pain    Subjective: No acute events overnight  Pt reports improvement in abdominal pain  NPO  No nausea vomiting  Having bowel movements, passing flatus  Denies fevers, chills, chest pain, shortness of breath  Objective: Physical exam    Blood pressure 91/52, pulse 56, temperature 98 8 °F (37 1 °C), resp  rate 16, height 5' 3" (1 6 m), weight 68 kg (150 lb), last menstrual period 09/18/2020, SpO2 100 %  ,Body mass index is 26 57 kg/m²  Intake/Output Summary (Last 24 hours) at 9/22/2020 0737  Last data filed at 9/22/2020 0549  Gross per 24 hour   Intake 2687 5 ml   Output 1400 ml   Net 1287 5 ml       Invasive Devices     Peripheral Intravenous Line            Peripheral IV 09/21/20 Left Antecubital 1 day                Physical Exam:   General appearance: alert and oriented, in no acute distress  Lungs: clear to auscultation bilaterally  Heart: regular rate and rhythm  Abdomen: Bowel sounds present, nondistended, soft, RUQ tenderness, negative Pascal sign      Lab, Imaging and other studies:  CBC:   Lab Results   Component Value Date    WBC 3 59 (L) 09/22/2020    HGB 7 1 (L) 09/22/2020    HCT 25 3 (L) 09/22/2020    MCV 78 (L) 09/22/2020     09/22/2020    MCH 21 2 (L) 09/22/2020    MCHC 27 3 (L) 09/22/2020    RDW 17 0 (H) 09/22/2020    MPV 10 7 09/22/2020    NRBC 0 09/22/2020     , CMP:   Lab Results   Component Value Date    SODIUM 142 09/22/2020    K 4 0 09/22/2020     (H) 09/22/2020    CO2 28 09/22/2020    BUN 7 09/22/2020    CREATININE 0 57 (L) 09/22/2020    CALCIUM 8 3 09/22/2020     (H) 09/22/2020     (H) 09/22/2020    ALKPHOS 73 09/22/2020    EGFR 125 09/22/2020     VTE Pharmacologic Prophylaxis: Heparin  VTE Mechanical Prophylaxis: sequential compression device

## 2020-09-22 NOTE — PROGRESS NOTES
Progress Note - [facility]  Krystal Saldana 26 y o  female MRN: [id number]  Unit/Bed#: [facility] 823-01 Encounter: [id number]      ASSESSMENT AND PLAN:    1  RUQ due to choledocolithiasis, and symptomatic cholelithiasis   - Continue NPO status   - Planned for ERCP this afternoon  -  IV fluids and analgesia per primary   - Will require interval  cholecystectomy as well-  Will defer to gen surgery  2  Iron deficiency anemia - likely due to h/o menorrhagia, but scheduled for outpatient EGD/ C- scope for 10/8 to r/o GI source  ___________________________________________________    SUBJECTIVE:     Pt assessed at bedside this morning  She reports pain is persistent, but mild in the RUQ as compared to yesterday  Has a HA  Has remained afebrile  No nausea or vomiting/ diarrhea/ abdominal distention noted  Was hypotensive overnight,but responded to fluid resuscitation       Medication Administration - last 24 hours from 09/21/2020 1001 to 09/22/2020 1001       Date/Time Order Dose Route Action Action by     09/22/2020 0107 lactated ringers infusion 125 mL/hr Intravenous Restarted Gingernadine GAURANG Miramontes     09/21/2020 2353 lactated ringers infusion 0 mL/hr Intravenous Paused Francie Miramontes RN     09/21/2020 2122 lactated ringers infusion 125 mL/hr Intravenous [address] Vernadine GAURANG Miramontes     09/21/2020 1128 lactated ringers infusion 125 mL/hr Intravenous [address] Erlinda GAURANG Cosme     09/21/2020 1127 lactated ringers infusion 0 mL/hr Intravenous Stopped Erlinda Cosme RN     09/22/2020 0513 heparin (porcine) subcutaneous injection 5,000 Units 5,000 Units Subcutaneous Given Vernadine GAURANG Miramontes     09/21/2020 2123 heparin (porcine) subcutaneous injection 5,000 Units 5,000 Units Subcutaneous Given Vernadine GAURANG Miramontes     09/21/2020 1452 heparin (porcine) subcutaneous injection 5,000 Units 5,000 Units Subcutaneous Given Erlinda Cosme RN     09/21/2020 1305 potassium chloride 20 mEq IVPB (premix) 20 mEq Intravenous [address] Y[address], [facility]     09/21/2020 1119 potassium chloride 20 mEq IVPB (premix) 20 mEq Intravenous [address] Yaa Carmona, RN     09/22/2020 2110 ceFAZolin (ANCEF) IVPB (premix in dextrose) 2,000 mg 50 mL 2,000 mg Intravenous [address] Jeanella Peace, [address] [place] [address]     09/21/2020 2124 ceFAZolin (ANCEF) IVPB (premix in dextrose) 2,000 mg 50 mL 2,000 mg Intravenous [address] Jeanella Peace, [address] [place] [address]     09/22/2020 2845 metroNIDAZOLE (FLAGYL) IVPB (premix) 500 mg 100 mL 500 mg Intravenous [address] Jeanella Peace, [address] [place] [address]     09/21/2020 2124 metroNIDAZOLE (FLAGYL) IVPB (premix) 500 mg 100 mL 500 mg Intravenous [address] Jamieanella Rosmery, [address] [place] [address]     09/22/2020 0107 lactated ringers bolus 1,000 mL 0 mL Intravenous Stopped Harley Botello RN     09/21/2020 2353 lactated ringers bolus 1,000 mL 1,000 mL Intravenous New Bag Harley Botello, GAURANG          OBJECTIVE:     Objective   Blood pressure 95/63, pulse 56, temperature 98 5 °F (36 9 °C), resp  rate 14, height 5' 3" (1 6 m), weight 68 kg (150 lb), last menstrual period 09/18/2020, SpO2 100 %  Body mass index is 26 57 kg/m²  Intake/Output Summary (Last 24 hours) at 9/22/2020 1001  Last data filed at 9/22/2020 0816  Gross per 24 hour   Intake 2687 5 ml   Output 1400 ml   Net 1287 5 ml       PHYSICAL EXAM:   General Appearance: Awake and alert, in no acute distress  Abdomen: Soft, Tenderness in RUQ, hypoactive bowel sounds  No guarding or rigidity  Non laboured breathing  Dry mucosa  Anicteric sclera       Invasive Devices     Peripheral Intravenous Line            Peripheral IV 09/21/20 Left Antecubital 1 day                LAB RESULTS:  Admission on 09/21/2020   Component Date Value    WBC 09/21/2020 4 61     RBC 09/21/2020 3 87     Hemoglobin 09/21/2020 8 3*    Hematocrit 09/21/2020 29 3*    MCV 09/21/2020 76*    MCH 09/21/2020 21 4*    MCHC 09/21/2020 28 3*    RDW 09/21/2020 16 7*    MPV 09/21/2020 10 0     Platelets 39/39/0248 355     nRBC 09/21/2020 0     Neutrophils Relative 09/21/2020 56     Immat GRANS % 09/21/2020 0     Lymphocytes Relative 09/21/2020 36     Monocytes Relative 09/21/2020 7     Eosinophils Relative 09/21/2020 1     Basophils Relative 09/21/2020 0     Neutrophils Absolute 09/21/2020 2 54     Immature Grans Absolute 09/21/2020 0 01     Lymphocytes Absolute 09/21/2020 1 67     Monocytes Absolute 09/21/2020 0 32     Eosinophils Absolute 09/21/2020 0 05     Basophils Absolute 09/21/2020 0 02     Sodium 09/21/2020 142     Potassium 09/21/2020 3 4*    Chloride 09/21/2020 109*    CO2 09/21/2020 26     ANION GAP 09/21/2020 7     BUN 09/21/2020 10     Creatinine 09/21/2020 0 58*    Glucose 09/21/2020 91     Calcium 09/21/2020 9 0     AST 09/21/2020 295*    ALT 09/21/2020 261*    Alkaline Phosphatase 09/21/2020 76     Total Protein 09/21/2020 7 9     Albumin 09/21/2020 4 1     Total Bilirubin 09/21/2020 0 77     eGFR 09/21/2020 124     Lipase 09/21/2020 174     GGT 09/21/2020 92*    Platelets 64/83/4849 279     MPV 09/21/2020 10 5     Sodium 09/21/2020 141     Potassium 09/21/2020 3 6     Chloride 09/21/2020 111*    CO2 09/21/2020 25     ANION GAP 09/21/2020 5     BUN 09/21/2020 9     Creatinine 09/21/2020 0 44*    Glucose 09/21/2020 88     Calcium 09/21/2020 8 6     AST 09/21/2020 382*    ALT 09/21/2020 294*    Alkaline Phosphatase 09/21/2020 83     Total Protein 09/21/2020 7 2     Albumin 09/21/2020 3 6     Total Bilirubin 09/21/2020 1 07*    eGFR 09/21/2020 136     WBC 09/21/2020 3 40*    RBC 09/21/2020 3 69*    Hemoglobin 09/21/2020 7 7*    Hematocrit 09/21/2020 28 2*    MCV 09/21/2020 76*    MCH 09/21/2020 20 9*    MCHC 09/21/2020 27 3*    RDW 09/21/2020 16 8*    MPV 09/21/2020 10 2     Platelets 64/07/6798 276     nRBC 09/21/2020 0     Neutrophils Relative 09/21/2020 69     Immat GRANS % 09/21/2020 0     Lymphocytes Relative 09/21/2020 24     Monocytes Relative 09/21/2020 6     Eosinophils Relative 09/21/2020 1  Basophils Relative 09/21/2020 0     Neutrophils Absolute 09/21/2020 2 34     Immature Grans Absolute 09/21/2020 0 01     Lymphocytes Absolute 09/21/2020 0 83     Monocytes Absolute 09/21/2020 0 19     Eosinophils Absolute 09/21/2020 0 02     Basophils Absolute 09/21/2020 0 01     Preg, Serum 09/21/2020 Negative     WBC 09/22/2020 3 59*    RBC 09/22/2020 3 25*    Hemoglobin 09/22/2020 7 1*    Hematocrit 09/22/2020 25 3*    MCV 09/22/2020 78*    MCH 09/22/2020 21 2*    MCHC 09/22/2020 27 3*    RDW 09/22/2020 17 0*    MPV 09/22/2020 10 7     Platelets 13/82/2920 250     nRBC 09/22/2020 0     Neutrophils Relative 09/22/2020 38*    Immat GRANS % 09/22/2020 0     Lymphocytes Relative 09/22/2020 50*    Monocytes Relative 09/22/2020 10     Eosinophils Relative 09/22/2020 2     Basophils Relative 09/22/2020 0     Neutrophils Absolute 09/22/2020 1 36*    Immature Grans Absolute 09/22/2020 0 01     Lymphocytes Absolute 09/22/2020 1 79     Monocytes Absolute 09/22/2020 0 36     Eosinophils Absolute 09/22/2020 0 06     Basophils Absolute 09/22/2020 0 01     Sodium 09/22/2020 142     Potassium 09/22/2020 4 0     Chloride 09/22/2020 112*    CO2 09/22/2020 28     ANION GAP 09/22/2020 2*    BUN 09/22/2020 7     Creatinine 09/22/2020 0 57*    Glucose 09/22/2020 75     Calcium 09/22/2020 8 3     Corrected Calcium 09/22/2020 9 2     AST 09/22/2020 144*    ALT 09/22/2020 191*    Alkaline Phosphatase 09/22/2020 73     Total Protein 09/22/2020 5 7*    Albumin 09/22/2020 2 9*    Total Bilirubin 09/22/2020 0 37     eGFR 09/22/2020 125        RADIOLOGY RESULTS: I have personally reviewed pertinent imaging studies  MRCP/ MRI abdomen wo contrast 9/21/2020 -   IMPRESSION:     Cholelithiasis with gallbladder wall thickening and pericholecystic fluid in keeping with acute cholecystitis      5 mm CBD calculus  CBD diameter measuring up to 11 mm   #10/59 through 64       US Abdomen 9/21/2020 - IMPRESSION:     Cholelithiasis with gallbladder distention, diffuse wall thickening, and trace pericholecystic fluid, which may be due to acute cholecystitis in the appropriate clinical setting    Dilatation of the common bile duct with findings suspicious for choledocholithiasis  MRCP may be considered for further evaluation

## 2020-09-22 NOTE — ANESTHESIA POSTPROCEDURE EVALUATION
Post-Op Assessment Note    CV Status:  Stable  Pain Score: 0    Pain management: adequate     Mental Status:  Alert   Hydration Status:  Stable   PONV Controlled:  None   Airway Patency:  Patent      Post Op Vitals Reviewed: Yes      Staff: CRNA         No complications documented      BP      Temp      Pulse     Resp      SpO2

## 2020-09-22 NOTE — PLAN OF CARE
Problem: PAIN - ADULT  Goal: Verbalizes/displays adequate comfort level or baseline comfort level  Description: Interventions:  - Encourage patient to monitor pain and request assistance  - Assess pain using appropriate pain scale  - Administer analgesics based on type and severity of pain and evaluate response  - Implement non-pharmacological measures as appropriate and evaluate response  - Consider cultural and social influences on pain and pain management  - Notify physician/advanced practitioner if interventions unsuccessful or patient reports new pain  Outcome: Progressing     Problem: INFECTION - ADULT  Goal: Absence or prevention of progression during hospitalization  Description: INTERVENTIONS:  - Assess and monitor for signs and symptoms of infection  - Monitor lab/diagnostic results  - Monitor all insertion sites, i e  indwelling lines, tubes, and drains  - Monitor endotracheal if appropriate and nasal secretions for changes in amount and color  - [facility] appropriate cooling/warming therapies per order  - Administer medications as ordered  - Instruct and encourage patient and family to use good hand hygiene technique  - Identify and instruct in appropriate isolation precautions for identified infection/condition  Outcome: Progressing  Goal: Absence of fever/infection during neutropenic period  Description: INTERVENTIONS:  - Monitor WBC    Outcome: Progressing     Problem: SAFETY ADULT  Goal: Patient will remain free of falls  Description: INTERVENTIONS:  - Assess patient frequently for physical needs  -  Identify cognitive and physical deficits and behaviors that affect risk of falls    -  [facility] fall precautions as indicated by assessment   - Educate patient/family on patient safety including physical limitations  - Instruct patient to call for assistance with activity based on assessment  - Modify environment to reduce risk of injury  - Consider OT/PT consult to assist with strengthening/mobility  Outcome: Progressing  Goal: Maintain or return to baseline ADL function  Description: INTERVENTIONS:  -  Assess patient's ability to carry out ADLs; assess patient's baseline for ADL function and identify physical deficits which impact ability to perform ADLs (bathing, care of mouth/teeth, toileting, grooming, dressing, etc )  - Assess/evaluate cause of self-care deficits   - Assess range of motion  - Assess patient's mobility; develop plan if impaired  - Assess patient's need for assistive devices and provide as appropriate  - Encourage maximum independence but intervene and supervise when necessary  - Involve family in performance of ADLs  - Assess for home care needs following discharge   - Consider OT consult to assist with ADL evaluation and planning for discharge  - Provide patient education as appropriate  Outcome: Progressing  Goal: Maintain or return mobility status to optimal level  Description: INTERVENTIONS:  - Assess patient's baseline mobility status (ambulation, transfers, stairs, etc )    - Identify cognitive and physical deficits and behaviors that affect mobility  - Identify mobility aids required to assist with transfers and/or ambulation (gait belt, sit-to-stand, lift, walker, cane, etc )  - [place] fall precautions as indicated by assessment  - Record patient progress and toleration of activity level on Mobility SBAR; progress patient to next Phase/Stage  - Instruct patient to call for assistance with activity based on assessment  - Consider rehabilitation consult to assist with strengthening/weightbearing, etc   Outcome: Progressing     Problem: DISCHARGE PLANNING  Goal: Discharge to home or other facility with appropriate resources  Description: INTERVENTIONS:  - Identify barriers to discharge w/patient and caregiver  - Arrange for needed discharge resources and transportation as appropriate  - Identify discharge learning needs (meds, wound care, etc )  - Arrange for interpretive services to assist at discharge as needed  - Refer to Case Management Department for coordinating discharge planning if the patient needs post-hospital services based on physician/advanced practitioner order or complex needs related to functional status, cognitive ability, or social support system  Outcome: Progressing     Problem: Knowledge Deficit  Goal: Patient/family/caregiver demonstrates understanding of disease process, treatment plan, medications, and discharge instructions  Description: Complete learning assessment and assess knowledge base    Interventions:  - Provide teaching at level of understanding  - Provide teaching via preferred learning methods  Outcome: Progressing

## 2020-09-22 NOTE — UTILIZATION REVIEW
Initial Clinical Review    Admission: Date/Time/Statement:   Admission Orders (From admission, onward)     Ordered        09/21/20 0236  Inpatient Admission  Once                   Orders Placed This Encounter   Procedures    Inpatient Admission     Standing Status:   Standing     Number of Occurrences:   1     Order Specific Question:   Admitting Physician     Answer:   Carolyn Portillo     Order Specific Question:   Level of Care     Answer:   Med Surg [16]     Order Specific Question:   Estimated length of stay     Answer:   More than 2 Midnights     Order Specific Question:   Certification     Answer:   I certify that inpatient services are medically necessary for this patient for a duration of greater than two midnights  See H&P and MD Progress Notes for additional information about the patient's course of treatment  ED Arrival Information     Expected Arrival Acuity Means of Arrival Escorted By Service Admission Type    - 9/21/2020 00:52 Urgent Walk-In Self Surgery-General Urgent    Arrival Complaint    Abdominal pain        Chief Complaint   Patient presents with    Abdominal Pain     "i'm having a gallbladder attack", took an oxycodone and now reports "my gallbladder is swollen" pain increased today, nausea no vomiting, no diarrhea, no urinary distress     Assessment/Plan:   Ms Naman Vasquez is a 25 yo female who presents to the ED from home with c/o intermittent, sharp, colicky R mid back and RUQ abd pain since 0500 on morning of admission with nausea  She has had this pain before and was here in July but was Covid + so surgery was not done and she was to set up appt for elective lap [place]  US abd showed cholelithiasis w/o GB wall thickening or pericholecystic fluid  She had elevated AST and ALT  Negative Pascal sign and no leukocytosis  PMH: laparoscopic gastric sleeve approximately 1 year ago    She is admitted to INPATIENT status with Symptomatic cholelithiasis - will have Lap [place], NPO, IV fluids, PRN analgesics and antiemetics  9/21 GI Consult - symptomatic cholelithiasis with dilate CBD and elevated LFTs, concern for choledocholelithiasis, MRI, MRCP, may need ERCP, interval cholecystectomy  9/22 will have ERCP today followed by Marlys Daniels (may be done 9/23)  Continue NPO,IV antibioitics, PRN analgesia  Improved abd pain, No N/V   + stool and flatus  9/22 pt was asymptomatic with lower BP over night       ED Triage Vitals   Temperature Pulse Respirations Blood Pressure SpO2   09/21/20 0100 09/21/20 0100 09/21/20 0100 09/21/20 0100 09/21/20 0100   97 8 °F (36 6 °C) 101 18 118/60 98 %      Temp Source Heart Rate Source Patient Position - Orthostatic VS BP Location FiO2 (%)   09/21/20 0100 09/21/20 0100 09/21/20 0100 09/21/20 0100 --   Oral Monitor Sitting Left arm       Pain Score       09/21/20 0112       Worst Possible Pain          Wt Readings from Last 1 Encounters:   09/21/20 68 kg (150 lb)     Additional Vital Signs:   09/22/20 08:16:02   98 5 °F (36 9 °C)   --   14   95/63   74   --   --   --    09/22/20 0108   --   --   --   91/52   --   --   --   Lying    09/22/20 0100   --   --   --   80/49Abnormal     59   --   --   --    09/21/20 2324   --   --   16   88/38Abnormal     --   100 %   --   Lying    09/21/20 23:14:18   98 8 °F (37 1 °C)   --   --   78/45Abnormal     56   --   --   --    09/21/20 21:54:51   97 7 °F (36 5 °C)   --   18   75/44Abnormal     54   --   --   --    09/21/20 15:23:24   98 1 °F (36 7 °C)   56   16   101/65   77   100 %   --   --    09/21/20 08:23:25   98 3 °F (36 8 °C)   63   18   83/54Abnormal     64   98 %   --   --    09/21/20 0321   --   --   --   --   --   --   None (Room air)   --    09/21/20 03:07:56   --   81   --   104/69   81   98 %   --   --    09/21/20 03:07:01   --   --   --   104/69   81   --   --   --      Pertinent Labs/Diagnostic Test Results:     9/21 US RUQ - Cholelithiasis with gallbladder distention, diffuse wall thickening, and trace pericholecystic fluid, which may be due to acute cholecystitis in the appropriate clinical setting  Dilatation of the common bile duct with findings suspicious for choledocholithiasis  MRCP may be considered for further evaluation  9/21 MRI ABD/MRCP - Cholelithiasis with gallbladder wall thickening and pericholecystic fluid in keeping with acute cholecystitis  5 mm CBD calculus  CBD diameter measuring up to 11 mm      Results from last 7 days   Lab Units 09/22/20  0436 09/21/20  0610 09/21/20  0108   WBC Thousand/uL 3 59* 3 40* 4 61   HEMOGLOBIN g/dL 7 1* 7 7* 8 3*   HEMATOCRIT % 25 3* 28 2* 29 3*   PLATELETS Thousands/uL 250 279  276 355   NEUTROS ABS Thousands/µL 1 36* 2 34 2 54         Results from last 7 days   Lab Units 09/22/20  0436 09/21/20  0610 09/21/20  0108   SODIUM mmol/L 142 141 142   POTASSIUM mmol/L 4 0 3 6 3 4*   CHLORIDE mmol/L 112* 111* 109*   CO2 mmol/L 28 25 26   ANION GAP mmol/L 2* 5 7   BUN mg/dL 7 9 10   CREATININE mg/dL 0 57* 0 44* 0 58*   EGFR ml/min/1 73sq m 125 136 124   CALCIUM mg/dL 8 3 8 6 9 0     Results from last 7 days   Lab Units 09/22/20  0436 09/21/20  0610 09/21/20  0108   AST U/L 144* 382* 295*   ALT U/L 191* 294* 261*   ALK PHOS U/L 73 83 76   TOTAL PROTEIN g/dL 5 7* 7 2 7 9   ALBUMIN g/dL 2 9* 3 6 4 1   TOTAL BILIRUBIN mg/dL 0 37 1 07* 0 77         Results from last 7 days   Lab Units 09/22/20  0436 09/21/20  0610 09/21/20  0108   GLUCOSE RANDOM mg/dL 75 88 91     Results from last 7 days   Lab Units 09/21/20  0108   LIPASE u/L 174     ED Treatment:   Medication Administration from 09/21/2020 0051 to 09/21/2020 0301    Date/Time Order Dose Route Action   09/21/2020 0111 sodium chloride 0 9 % bolus 1,000 mL 1,000 mL Intravenous New Bag   09/21/2020 0112 ondansetron (ZOFRAN) injection 4 mg 4 mg Intravenous Given   09/21/2020 0112 HYDROmorphone (DILAUDID) injection 0 5 mg 0 5 mg Intravenous Given        Past Medical History:   Diagnosis Date    Anemia     Asthma Present on Admission:   Choledocholithiasis    Admitting Diagnosis: Abdominal pain [R10 9]  Symptomatic cholelithiasis [K80 20]     Age/Sex: 26 y o  female     Admission Orders:    Scheduled Medications:  cefazolin, 2,000 mg, Intravenous, Q8H  heparin (porcine), 5,000 Units, Subcutaneous, Q8H [facility]  metroNIDAZOLE, 500 mg, Intravenous, Q8H    Continuous IV Infusions:  lactated ringers, 125 mL/hr, Intravenous, Continuous    PRN Meds:  acetaminophen, 650 mg, Oral, Q4H PRN - X1 9/22  HYDROmorphone, 0 2 mg, Intravenous, Q3H PRN  ondansetron, 4 mg, Intravenous, Q6H PRN - X1 9/21  oxyCODONE, 10 mg, Oral, Q4H PRN   oxyCODONE, 5 mg, Oral, Q4H PRN - X1 9/21    SCDs  Up as selvin   ERCP   Diet NPO   IP CONSULT TO GASTROENTEROLOGY    Network Utilization Review Department  [email or web address]  ATTENTION: Please call with any questions or concerns to [phone] and carefully listen to the prompts so that you are directed to the right person  All voicemails are confidential   Jimmy Delude all requests for admission clinical reviews, approved or denied determinations and any other requests to dedicated fax number below belonging to the campus where the patient is receiving treatment   List of dedicated fax numbers for the Facilities:  [address] DENIALS (Administrative/Medical Necessity) [phone]   [address] (Maternity/NICU/Pediatrics) [phone]   Ninfa Anderson [phone]     [address] [phone]   [facility] [phone]   Evy Munoz [phone]   [facility] [address] [facility] [phone]   [facility]  [phone]   [address] [facility] [address]  [address] [facility] [address] [facility] [phone]

## 2020-09-22 NOTE — ANESTHESIA PREPROCEDURE EVALUATION
Procedure:  CHOLECYSTECTOMY LAPAROSCOPIC W/ INTRAOP CHOLANGIOGRAM (N/A Abdomen)    Relevant Problems   PULMONARY   (+) Asthma      Other   (+) Choledocholithiasis      Recent labs personally reviewed:  Lab Results   Component Value Date    WBC 2 79 (L) 09/23/2020    HGB 6 9 (LL) 09/23/2020     09/23/2020     Lab Results   Component Value Date    K 3 9 09/23/2020    BUN 5 09/23/2020    CREATININE 0 46 (L) 09/23/2020     No results found for: PTT   No results found for: INR    Blood type B    Lab Results   Component Value Date    HGBA1C 5 2 04/09/2019     Physical Exam    Airway    Mallampati score: II  TM Distance: >3 FB  Neck ROM: full     Dental       Cardiovascular  Rhythm: regular, Rate: normal,     Pulmonary  Breath sounds clear to auscultation,     Other Findings        Anesthesia Plan  ASA Score- 2     Anesthesia Type- general with ASA Monitors  Additional Monitors:   Airway Plan: ETT  Comment: Pierre Morris MD, have personally seen and evaluated the patient prior to anesthetic care  I have reviewed the pre-anesthetic record, and other medical records if appropriate to the anesthetic care  If a CRNA is involved in the case, I have reviewed the CRNA assessment, if present, and agree  All risks/benefits and alternatives were discussed with the patient including the possibility of aspiration, PONV, sore throat, as well as the possibility of other rare anesthetic and surgical emergencies  Risks involved with airway management, line placement, postoperative vision loss, neuropathy secondary to surgical positioning, as well as possible memory/awareness were discussed  Post-operative management was also discussed including the possibility of post-operative mechanical ventilation  Patient agreed and had no further questions prior to procedure          Plan Factors-Exercise tolerance (METS): >4 METS  Chart reviewed  EKG reviewed  Existing labs reviewed  Patient summary reviewed      Patient is not a current smoker  Patient did not smoke on day of surgery  Induction- intravenous  Postoperative Plan- Plan for postoperative opioid use  Planned trial extubation    Informed Consent- Anesthetic plan and risks discussed with patient  I personally reviewed this patient with the CRNA  Discussed and agreed on the Anesthesia Plan with the CRNA  Michelle Ellison

## 2020-09-22 NOTE — QUICK NOTE
Nurse-Patient-Provider rounds were completed with the patient's nurse today, Lilian Salas  We discussed the plan is to   - ERCP today  - likely lap salina tomorrow    We reviewed all of the invasive devices/lines/telemetry orders   - None  DVT prophylaxis:  - heparin    Diet:  - NPO    Pain Assessment / Plan:  - Continue current pain management regimen    Mobility Assessment / Plan:  - Activity as tolerated          Sarah Wills PA-C

## 2020-09-22 NOTE — ANESTHESIA PREPROCEDURE EVALUATION
Procedure:  ERCP    Relevant Problems   PULMONARY   (+) Asthma      Other   (+) Choledocholithiasis        Physical Exam    Airway    Mallampati score: I  TM Distance: >3 FB  Neck ROM: full     Dental   No notable dental hx     Cardiovascular  Rhythm: regular, Rate: normal, Cardiovascular exam normal    Pulmonary  Pulmonary exam normal Breath sounds clear to auscultation,     Other Findings        Anesthesia Plan  ASA Score- 2     Anesthesia Type- IV sedation with anesthesia and general with ASA Monitors  Additional Monitors:   Airway Plan: ETT  Plan Factors-Exercise tolerance (METS): >4 METS  Chart reviewed  Existing labs reviewed  Patient summary reviewed  Patient is not a current smoker  Patient did not smoke on day of surgery  Induction- intravenous and rapid sequence induction  Postoperative Plan-   Planned trial extubation    Informed Consent- Anesthetic plan and risks discussed with patient  I personally reviewed this patient with the CRNA  Discussed and agreed on the Anesthesia Plan with the CRNA  Mike Stephenson

## 2020-09-23 ENCOUNTER — ANESTHESIA (INPATIENT)
Dept: PERIOP | Facility: HOSPITAL | Age: 30
DRG: 263 | End: 2020-09-23
Payer: COMMERCIAL

## 2020-09-23 VITALS — HEART RATE: 83 BPM

## 2020-09-23 LAB
ABO GROUP BLD: NORMAL
ALBUMIN SERPL BCP-MCNC: 3.1 G/DL (ref 3.5–5)
ALP SERPL-CCNC: 77 U/L (ref 46–116)
ALT SERPL W P-5'-P-CCNC: 144 U/L (ref 12–78)
ANION GAP SERPL CALCULATED.3IONS-SCNC: 8 MMOL/L (ref 4–13)
ANISOCYTOSIS BLD QL SMEAR: PRESENT
AST SERPL W P-5'-P-CCNC: 81 U/L (ref 5–45)
BASOPHILS # BLD AUTO: 0.01 THOUSANDS/ΜL (ref 0–0.1)
BASOPHILS # BLD MANUAL: 0 THOUSAND/UL (ref 0–0.1)
BASOPHILS NFR BLD AUTO: 0 % (ref 0–1)
BASOPHILS NFR MAR MANUAL: 0 % (ref 0–1)
BILIRUB SERPL-MCNC: 0.53 MG/DL (ref 0.2–1)
BLD GP AB SCN SERPL QL: NEGATIVE
BUN SERPL-MCNC: 5 MG/DL (ref 5–25)
CALCIUM ALBUM COR SERPL-MCNC: 9.2 MG/DL (ref 8.3–10.1)
CALCIUM SERPL-MCNC: 8.5 MG/DL (ref 8.3–10.1)
CHLORIDE SERPL-SCNC: 108 MMOL/L (ref 100–108)
CO2 SERPL-SCNC: 26 MMOL/L (ref 21–32)
CREAT SERPL-MCNC: 0.46 MG/DL (ref 0.6–1.3)
EOSINOPHIL # BLD AUTO: 0.03 THOUSAND/ΜL (ref 0–0.61)
EOSINOPHIL # BLD MANUAL: 0 THOUSAND/UL (ref 0–0.4)
EOSINOPHIL NFR BLD AUTO: 1 % (ref 0–6)
EOSINOPHIL NFR BLD MANUAL: 0 % (ref 0–6)
ERYTHROCYTE [DISTWIDTH] IN BLOOD BY AUTOMATED COUNT: 16.5 % (ref 11.6–15.1)
ERYTHROCYTE [DISTWIDTH] IN BLOOD BY AUTOMATED COUNT: 16.6 % (ref 11.6–15.1)
GFR SERPL CREATININE-BSD FRML MDRD: 134 ML/MIN/1.73SQ M
GIANT PLATELETS BLD QL SMEAR: PRESENT
GLUCOSE SERPL-MCNC: 74 MG/DL (ref 65–140)
HCT VFR BLD AUTO: 24.8 % (ref 34.8–46.1)
HCT VFR BLD AUTO: 32.2 % (ref 34.8–46.1)
HGB BLD-MCNC: 6.9 G/DL (ref 11.5–15.4)
HGB BLD-MCNC: 9.1 G/DL (ref 11.5–15.4)
IMM GRANULOCYTES # BLD AUTO: 0.02 THOUSAND/UL (ref 0–0.2)
IMM GRANULOCYTES NFR BLD AUTO: 1 % (ref 0–2)
LYMPHOCYTES # BLD AUTO: 0 % (ref 14–44)
LYMPHOCYTES # BLD AUTO: 0 THOUSAND/UL (ref 0.6–4.47)
LYMPHOCYTES # BLD AUTO: 0.9 THOUSANDS/ΜL (ref 0.6–4.47)
LYMPHOCYTES NFR BLD AUTO: 32 % (ref 14–44)
MCH RBC QN AUTO: 21.4 PG (ref 26.8–34.3)
MCH RBC QN AUTO: 22.4 PG (ref 26.8–34.3)
MCHC RBC AUTO-ENTMCNC: 27.8 G/DL (ref 31.4–37.4)
MCHC RBC AUTO-ENTMCNC: 28.3 G/DL (ref 31.4–37.4)
MCV RBC AUTO: 77 FL (ref 82–98)
MCV RBC AUTO: 79 FL (ref 82–98)
MONOCYTES # BLD AUTO: 0.13 THOUSAND/UL (ref 0–1.22)
MONOCYTES # BLD AUTO: 0.22 THOUSAND/ΜL (ref 0.17–1.22)
MONOCYTES NFR BLD AUTO: 8 % (ref 4–12)
MONOCYTES NFR BLD: 2 % (ref 4–12)
NEUTROPHILS # BLD AUTO: 1.61 THOUSANDS/ΜL (ref 1.85–7.62)
NEUTROPHILS # BLD MANUAL: 6.38 THOUSAND/UL (ref 1.85–7.62)
NEUTS SEG NFR BLD AUTO: 58 % (ref 43–75)
NEUTS SEG NFR BLD AUTO: 98 % (ref 43–75)
NRBC BLD AUTO-RTO: 0 /100 WBCS
NRBC BLD AUTO-RTO: 0 /100 WBCS
PLATELET # BLD AUTO: 238 THOUSANDS/UL (ref 149–390)
PLATELET # BLD AUTO: 243 THOUSANDS/UL (ref 149–390)
PLATELET BLD QL SMEAR: ADEQUATE
PMV BLD AUTO: 10.5 FL (ref 8.9–12.7)
PMV BLD AUTO: 11.5 FL (ref 8.9–12.7)
POIKILOCYTOSIS BLD QL SMEAR: PRESENT
POLYCHROMASIA BLD QL SMEAR: PRESENT
POTASSIUM SERPL-SCNC: 3.9 MMOL/L (ref 3.5–5.3)
PROT SERPL-MCNC: 6 G/DL (ref 6.4–8.2)
RBC # BLD AUTO: 3.22 MILLION/UL (ref 3.81–5.12)
RBC # BLD AUTO: 4.07 MILLION/UL (ref 3.81–5.12)
RBC MORPH BLD: PRESENT
RH BLD: POSITIVE
SMUDGE CELLS BLD QL SMEAR: PRESENT
SODIUM SERPL-SCNC: 142 MMOL/L (ref 136–145)
SPECIMEN EXPIRATION DATE: NORMAL
WBC # BLD AUTO: 2.79 THOUSAND/UL (ref 4.31–10.16)
WBC # BLD AUTO: 6.51 THOUSAND/UL (ref 4.31–10.16)

## 2020-09-23 PROCEDURE — 85027 COMPLETE CBC AUTOMATED: CPT | Performed by: SURGERY

## 2020-09-23 PROCEDURE — 80053 COMPREHEN METABOLIC PANEL: CPT | Performed by: STUDENT IN AN ORGANIZED HEALTH CARE EDUCATION/TRAINING PROGRAM

## 2020-09-23 PROCEDURE — 86900 BLOOD TYPING SEROLOGIC ABO: CPT | Performed by: SURGERY

## 2020-09-23 PROCEDURE — 86923 COMPATIBILITY TEST ELECTRIC: CPT

## 2020-09-23 PROCEDURE — 0FT44ZZ RESECTION OF GALLBLADDER, PERCUTANEOUS ENDOSCOPIC APPROACH: ICD-10-PCS | Performed by: SURGERY

## 2020-09-23 PROCEDURE — P9016 RBC LEUKOCYTES REDUCED: HCPCS

## 2020-09-23 PROCEDURE — 86850 RBC ANTIBODY SCREEN: CPT | Performed by: SURGERY

## 2020-09-23 PROCEDURE — 30233N1 TRANSFUSION OF NONAUTOLOGOUS RED BLOOD CELLS INTO PERIPHERAL VEIN, PERCUTANEOUS APPROACH: ICD-10-PCS | Performed by: SURGERY

## 2020-09-23 PROCEDURE — 86901 BLOOD TYPING SEROLOGIC RH(D): CPT | Performed by: SURGERY

## 2020-09-23 PROCEDURE — 85007 BL SMEAR W/DIFF WBC COUNT: CPT | Performed by: SURGERY

## 2020-09-23 PROCEDURE — 88304 TISSUE EXAM BY PATHOLOGIST: CPT | Performed by: PATHOLOGY

## 2020-09-23 PROCEDURE — 85025 COMPLETE CBC W/AUTO DIFF WBC: CPT | Performed by: STUDENT IN AN ORGANIZED HEALTH CARE EDUCATION/TRAINING PROGRAM

## 2020-09-23 RX ORDER — PROPOFOL 10 MG/ML
INJECTION, EMULSION INTRAVENOUS AS NEEDED
Status: DISCONTINUED | OUTPATIENT
Start: 2020-09-23 | End: 2020-09-23

## 2020-09-23 RX ORDER — MAGNESIUM HYDROXIDE 1200 MG/15ML
LIQUID ORAL AS NEEDED
Status: DISCONTINUED | OUTPATIENT
Start: 2020-09-23 | End: 2020-09-23 | Stop reason: HOSPADM

## 2020-09-23 RX ORDER — BUPIVACAINE HYDROCHLORIDE 5 MG/ML
INJECTION, SOLUTION PERINEURAL AS NEEDED
Status: DISCONTINUED | OUTPATIENT
Start: 2020-09-23 | End: 2020-09-23 | Stop reason: HOSPADM

## 2020-09-23 RX ORDER — ROCURONIUM BROMIDE 10 MG/ML
INJECTION, SOLUTION INTRAVENOUS AS NEEDED
Status: DISCONTINUED | OUTPATIENT
Start: 2020-09-23 | End: 2020-09-23

## 2020-09-23 RX ORDER — METHOCARBAMOL 500 MG/1
500 TABLET, FILM COATED ORAL EVERY 6 HOURS SCHEDULED
Status: DISCONTINUED | OUTPATIENT
Start: 2020-09-23 | End: 2020-09-26 | Stop reason: HOSPADM

## 2020-09-23 RX ORDER — KETAMINE HYDROCHLORIDE 50 MG/ML
INJECTION, SOLUTION, CONCENTRATE INTRAMUSCULAR; INTRAVENOUS AS NEEDED
Status: DISCONTINUED | OUTPATIENT
Start: 2020-09-23 | End: 2020-09-23

## 2020-09-23 RX ORDER — SODIUM CHLORIDE, SODIUM LACTATE, POTASSIUM CHLORIDE, CALCIUM CHLORIDE 600; 310; 30; 20 MG/100ML; MG/100ML; MG/100ML; MG/100ML
75 INJECTION, SOLUTION INTRAVENOUS CONTINUOUS
Status: DISCONTINUED | OUTPATIENT
Start: 2020-09-23 | End: 2020-09-25

## 2020-09-23 RX ORDER — ONDANSETRON 2 MG/ML
INJECTION INTRAMUSCULAR; INTRAVENOUS AS NEEDED
Status: DISCONTINUED | OUTPATIENT
Start: 2020-09-23 | End: 2020-09-23

## 2020-09-23 RX ORDER — FENTANYL CITRATE/PF 50 MCG/ML
50 SYRINGE (ML) INJECTION
Status: COMPLETED | OUTPATIENT
Start: 2020-09-23 | End: 2020-09-23

## 2020-09-23 RX ORDER — HYDROMORPHONE HCL/PF 1 MG/ML
0.5 SYRINGE (ML) INJECTION
Status: DISCONTINUED | OUTPATIENT
Start: 2020-09-23 | End: 2020-09-23 | Stop reason: HOSPADM

## 2020-09-23 RX ORDER — ACETAMINOPHEN 325 MG/1
650 TABLET ORAL EVERY 6 HOURS SCHEDULED
Status: DISCONTINUED | OUTPATIENT
Start: 2020-09-23 | End: 2020-09-26 | Stop reason: HOSPADM

## 2020-09-23 RX ORDER — SODIUM CHLORIDE, SODIUM LACTATE, POTASSIUM CHLORIDE, CALCIUM CHLORIDE 600; 310; 30; 20 MG/100ML; MG/100ML; MG/100ML; MG/100ML
75 INJECTION, SOLUTION INTRAVENOUS CONTINUOUS
Status: DISCONTINUED | OUTPATIENT
Start: 2020-09-23 | End: 2020-09-23

## 2020-09-23 RX ORDER — SODIUM CHLORIDE 9 MG/ML
INJECTION, SOLUTION INTRAVENOUS CONTINUOUS PRN
Status: DISCONTINUED | OUTPATIENT
Start: 2020-09-23 | End: 2020-09-23

## 2020-09-23 RX ORDER — OXYCODONE HYDROCHLORIDE 5 MG/1
5 TABLET ORAL EVERY 4 HOURS PRN
Status: DISCONTINUED | OUTPATIENT
Start: 2020-09-23 | End: 2020-09-26 | Stop reason: HOSPADM

## 2020-09-23 RX ORDER — LIDOCAINE HYDROCHLORIDE 10 MG/ML
INJECTION, SOLUTION EPIDURAL; INFILTRATION; INTRACAUDAL; PERINEURAL AS NEEDED
Status: DISCONTINUED | OUTPATIENT
Start: 2020-09-23 | End: 2020-09-23

## 2020-09-23 RX ORDER — MIDAZOLAM HYDROCHLORIDE 2 MG/2ML
INJECTION, SOLUTION INTRAMUSCULAR; INTRAVENOUS AS NEEDED
Status: DISCONTINUED | OUTPATIENT
Start: 2020-09-23 | End: 2020-09-23

## 2020-09-23 RX ORDER — ALBUTEROL SULFATE 2.5 MG/3ML
2.5 SOLUTION RESPIRATORY (INHALATION) ONCE AS NEEDED
Status: DISCONTINUED | OUTPATIENT
Start: 2020-09-23 | End: 2020-09-23 | Stop reason: HOSPADM

## 2020-09-23 RX ORDER — ONDANSETRON 2 MG/ML
4 INJECTION INTRAMUSCULAR; INTRAVENOUS ONCE AS NEEDED
Status: DISCONTINUED | OUTPATIENT
Start: 2020-09-23 | End: 2020-09-23 | Stop reason: HOSPADM

## 2020-09-23 RX ORDER — KETAMINE HCL IN NACL, ISO-OSM 100MG/10ML
SYRINGE (ML) INJECTION AS NEEDED
Status: DISCONTINUED | OUTPATIENT
Start: 2020-09-23 | End: 2020-09-23

## 2020-09-23 RX ORDER — HYDROMORPHONE HCL 110MG/55ML
PATIENT CONTROLLED ANALGESIA SYRINGE INTRAVENOUS AS NEEDED
Status: DISCONTINUED | OUTPATIENT
Start: 2020-09-23 | End: 2020-09-23

## 2020-09-23 RX ORDER — DEXAMETHASONE SODIUM PHOSPHATE 10 MG/ML
INJECTION, SOLUTION INTRAMUSCULAR; INTRAVENOUS AS NEEDED
Status: DISCONTINUED | OUTPATIENT
Start: 2020-09-23 | End: 2020-09-23

## 2020-09-23 RX ORDER — FENTANYL CITRATE 50 UG/ML
INJECTION, SOLUTION INTRAMUSCULAR; INTRAVENOUS AS NEEDED
Status: DISCONTINUED | OUTPATIENT
Start: 2020-09-23 | End: 2020-09-23

## 2020-09-23 RX ADMIN — FENTANYL CITRATE 50 MCG: 50 INJECTION, SOLUTION INTRAMUSCULAR; INTRAVENOUS at 09:12

## 2020-09-23 RX ADMIN — DEXAMETHASONE SODIUM PHOSPHATE 10 MG: 10 INJECTION, SOLUTION INTRAMUSCULAR; INTRAVENOUS at 08:50

## 2020-09-23 RX ADMIN — FENTANYL CITRATE 100 MCG: 50 INJECTION, SOLUTION INTRAMUSCULAR; INTRAVENOUS at 08:46

## 2020-09-23 RX ADMIN — HYDROMORPHONE HYDROCHLORIDE 0.2 MG: 1 INJECTION, SOLUTION INTRAMUSCULAR; INTRAVENOUS; SUBCUTANEOUS at 20:23

## 2020-09-23 RX ADMIN — OXYCODONE HYDROCHLORIDE 10 MG: 10 TABLET ORAL at 13:14

## 2020-09-23 RX ADMIN — FENTANYL CITRATE 50 MCG: 50 INJECTION, SOLUTION INTRAMUSCULAR; INTRAVENOUS at 09:42

## 2020-09-23 RX ADMIN — ROCURONIUM BROMIDE 10 MG: 10 INJECTION, SOLUTION INTRAVENOUS at 09:37

## 2020-09-23 RX ADMIN — SODIUM CHLORIDE, SODIUM LACTATE, POTASSIUM CHLORIDE, AND CALCIUM CHLORIDE 75 ML/HR: .6; .31; .03; .02 INJECTION, SOLUTION INTRAVENOUS at 17:56

## 2020-09-23 RX ADMIN — HYDROMORPHONE HYDROCHLORIDE 0.5 MG: 2 INJECTION, SOLUTION INTRAMUSCULAR; INTRAVENOUS; SUBCUTANEOUS at 10:54

## 2020-09-23 RX ADMIN — SUGAMMADEX 200 MG: 100 INJECTION, SOLUTION INTRAVENOUS at 10:36

## 2020-09-23 RX ADMIN — CEFAZOLIN 2000 MG: 1 INJECTION, POWDER, FOR SOLUTION INTRAMUSCULAR; INTRAVENOUS at 08:34

## 2020-09-23 RX ADMIN — ONDANSETRON 4 MG: 2 INJECTION INTRAMUSCULAR; INTRAVENOUS at 08:50

## 2020-09-23 RX ADMIN — HYDROMORPHONE HYDROCHLORIDE 0.5 MG: 1 INJECTION, SOLUTION INTRAMUSCULAR; INTRAVENOUS; SUBCUTANEOUS at 12:10

## 2020-09-23 RX ADMIN — MIDAZOLAM 2 MG: 1 INJECTION INTRAMUSCULAR; INTRAVENOUS at 08:38

## 2020-09-23 RX ADMIN — METHOCARBAMOL TABLETS 500 MG: 500 TABLET, COATED ORAL at 23:45

## 2020-09-23 RX ADMIN — HYDROMORPHONE HYDROCHLORIDE 0.5 MG: 1 INJECTION, SOLUTION INTRAMUSCULAR; INTRAVENOUS; SUBCUTANEOUS at 11:58

## 2020-09-23 RX ADMIN — ONDANSETRON 4 MG: 2 INJECTION INTRAMUSCULAR; INTRAVENOUS at 22:26

## 2020-09-23 RX ADMIN — SODIUM CHLORIDE, SODIUM LACTATE, POTASSIUM CHLORIDE, AND CALCIUM CHLORIDE: .6; .31; .03; .02 INJECTION, SOLUTION INTRAVENOUS at 08:38

## 2020-09-23 RX ADMIN — CEFAZOLIN SODIUM 2000 MG: 2 SOLUTION INTRAVENOUS at 05:17

## 2020-09-23 RX ADMIN — OXYCODONE HYDROCHLORIDE 10 MG: 10 TABLET ORAL at 23:45

## 2020-09-23 RX ADMIN — FENTANYL CITRATE 50 MCG: 50 INJECTION, SOLUTION INTRAMUSCULAR; INTRAVENOUS at 10:39

## 2020-09-23 RX ADMIN — ACETAMINOPHEN 650 MG: 325 TABLET, FILM COATED ORAL at 23:45

## 2020-09-23 RX ADMIN — Medication 50 MG: at 08:46

## 2020-09-23 RX ADMIN — Medication 50 MCG: at 11:30

## 2020-09-23 RX ADMIN — ACETAMINOPHEN 650 MG: 325 TABLET, FILM COATED ORAL at 17:54

## 2020-09-23 RX ADMIN — LIDOCAINE HYDROCHLORIDE 50 MG: 10 INJECTION, SOLUTION EPIDURAL; INFILTRATION; INTRACAUDAL; PERINEURAL at 08:46

## 2020-09-23 RX ADMIN — PROPOFOL 50 MG: 10 INJECTION, EMULSION INTRAVENOUS at 08:46

## 2020-09-23 RX ADMIN — SODIUM CHLORIDE, SODIUM LACTATE, POTASSIUM CHLORIDE, AND CALCIUM CHLORIDE 75 ML/HR: .6; .31; .03; .02 INJECTION, SOLUTION INTRAVENOUS at 12:10

## 2020-09-23 RX ADMIN — HEPARIN SODIUM 5000 UNITS: 5000 INJECTION INTRAVENOUS; SUBCUTANEOUS at 22:16

## 2020-09-23 RX ADMIN — FENTANYL CITRATE 50 MCG: 50 INJECTION, SOLUTION INTRAMUSCULAR; INTRAVENOUS at 09:04

## 2020-09-23 RX ADMIN — HEPARIN SODIUM 5000 UNITS: 5000 INJECTION INTRAVENOUS; SUBCUTANEOUS at 13:14

## 2020-09-23 RX ADMIN — METHOCARBAMOL TABLETS 500 MG: 500 TABLET, COATED ORAL at 17:54

## 2020-09-23 RX ADMIN — OXYCODONE HYDROCHLORIDE 10 MG: 10 TABLET ORAL at 17:53

## 2020-09-23 RX ADMIN — Medication 50 MCG: at 11:10

## 2020-09-23 RX ADMIN — Medication 50 MCG: at 11:50

## 2020-09-23 RX ADMIN — Medication 50 MCG: at 11:20

## 2020-09-23 RX ADMIN — ROCURONIUM BROMIDE 50 MG: 10 INJECTION, SOLUTION INTRAVENOUS at 08:46

## 2020-09-23 RX ADMIN — HEPARIN SODIUM 5000 UNITS: 5000 INJECTION INTRAVENOUS; SUBCUTANEOUS at 05:17

## 2020-09-23 RX ADMIN — SODIUM CHLORIDE: 0.9 INJECTION, SOLUTION INTRAVENOUS at 08:55

## 2020-09-23 RX ADMIN — SODIUM CHLORIDE, SODIUM LACTATE, POTASSIUM CHLORIDE, AND CALCIUM CHLORIDE 125 ML/HR: .6; .31; .03; .02 INJECTION, SOLUTION INTRAVENOUS at 03:24

## 2020-09-23 NOTE — OP NOTE
OPERATIVE REPORT  PATIENT NAME: Constantino Ferro    :  1990  MRN: [id number]  Pt Location: [facility] OR ROOM 06    SURGERY DATE: 2020    Surgeon(s) and Role:     * Senthil De La Rosa MD - Assisting    Preop Diagnosis:  Choledocholithiasis [K80 50]  Acute on chronic cholecystitis [K81 2]    Post-Op Diagnosis Codes:     * Choledocholithiasis [K80 50]     * Acute on chronic cholecystitis [K81 2]    Procedure(s) (LRB):  CHOLECYSTECTOMY LAPAROSCOPIC (N/A)    Specimen(s):  ID Type Source Tests Collected by Time Destination   1 :  Tissue Gallbladder TISSUE EXAM Carlyle Yash 2020 1009        Estimated Blood Loss:   25 mL    Drains:  * No LDAs found *    Anesthesia Type:   General    Operative Indications:  Choledocholithiasis [K80 50]  Acute on chronic cholecystitis [K81 2]      Operative Findings:  Inflamed adhered gallbladder  Critical view of safety obtained  Spillage of bile that was irrigated and suctioned away      Complications:   None    Procedure and Technique  Pt placed supine on table and prepped and draped in usual sterile manner  Timeout performed and all elements of timeout reviewed and confirmed  We began by using a Veress needle at [facility] due to her prior surgeires  Pneumoperitoneum was obtained  An infraumbilical incision was made and camera inserted, direct inspection only showed inflamed gallbladder  The subxiphoid and right sided ports were then placed under direct visualization and the gallbladder was retracted cephalad and laterally  The cystic duct and cystic artery were carefully skeletonized until a critical view could be accomplished  Once this was done these structures were sequentially clipped and divided  The gallbladder was removed from the gallbladder fossa with the laparoscopic spatula and bovie cautery  We had bile spillage from the gallbladder and this was irrigated away   It was placed in an Endocatch bag and removed through the subxiphoid port site without difficulty  The RUQ was irrigated with warm normal saline until the return fluid was clear of blood and bile  The ports were then removed under direct visualization without difficulty  The 11mm port was closed using 0 vicryl  All the skin sites were closed with running 4-0 monocryl subcuticular suture and glue  Pt tolerated the procedure well, was transported to PACU in stable condition and sponge and instrument counts were correct      Dr Cali Dobbins was present for the entire procedure    Patient Disposition:  PACU     SIGNATURE: Swapna Pastrana MD  DATE: September 23, 2020  TIME: 10:58 AM

## 2020-09-23 NOTE — QUICK NOTE
Nurse-Patient-Provider rounds were completed with the patient's nurse today, Viktoriya Flores  We discussed the plan is to resume a regular diet and monitor abdominal exam   Continue IV fluids secondary to persistent back pain postoperatively in setting of recent ERCP, patient may be experiencing mild post ERCP pancreatitis  Encourage activity as tolerated as well as use of incentive spirometry  Plan for repeat H&H postoperatively in setting of acute blood loss anemia and transfusion of 1 unit packed red blood cells earlier today; further transfusions only as indicated  Once tolerating oral diet, transition to oral analgesics  Postoperative evaluation and physical exam:    Patient reports that she is doing well  She is sleepy and nervous about eating, but has tolerated liquids and some crackers  She has some pain around her incisions, particularly on the right side of abdomen, and also notes some pain in her back  GENERAL APPEARANCE: Patient in no acute distress  HEENT: NCAT; EOMs intact; Mucous membranes moist  NECK / BACK: Nontender  CV: Regular rate and rhythm; no murmur/gallops/rubs appreciated  CHEST / LUNGS: Clear to auscultation; no wheezes/rales/rhonci  ABD: NABS; soft; non-distended; mild-to-moderate tenderness in the right upper quadrant and around her incisions  Incisions are clean/dry/intact with skin glue in place  No evidence of peritonitis  EXT: +2 pulses bilaterally upper & lower extremities; no edema  NEURO: GCS 15; no focal neurologic deficits; neurovascularly intact  SKIN: Warm, dry and well perfused; no rash; no jaundice  We reviewed all of the invasive devices/lines/telemetry orders   - None  DVT Prophylaxis:  - Subcutaneous heparin and SCDs  Pain Assessment / Plan:  - Continue current analgesic regimen  Mobility Assessment / Plan:  - Activity as tolerated      Goals / Barriers for discharge:  - Anticipate discharge on 09/24/2020 tolerating diet, hemoglobin stable, patient has adequate analgesia with oral medications  - Case management following; case and discharge needs discussed  All questions and concerns were addressed  I spent greater than 26 minutes complaining her postoperative re-evaluation, reviewing the plan with the patient and the nurse, and coordinating care for the day      Ernie Gonzalez PA-C  9/23/2020 03:02 PM

## 2020-09-23 NOTE — ANESTHESIA POSTPROCEDURE EVALUATION
Post-Op Assessment Note    CV Status:  Stable  Pain Score: 0    Pain management: adequate     Mental Status:  Arousable   Hydration Status:  Stable   PONV Controlled:  None   Airway Patency:  Patent      Post Op Vitals Reviewed: Yes      Staff: Anesthesiologist, CRNA         No complications documented      /72 (09/23/20 1101)    Temp 97 8 °F (36 6 °C) (09/23/20 1101)    Pulse 70 (09/23/20 1101)   Resp 16 (09/23/20 1101)    SpO2 100 % (09/23/20 1101)

## 2020-09-23 NOTE — QUICK NOTE
27 y/o F with h/o gastric sleeve, cholelithiasis , currently admitted for choledocholithiasis and symptomatic cholelithiasis  She is s/p ERCP with stone removal on 9/22/2020  Attempted to see patient for follow up afterERCP, but she was being transported to OR for cholecystectomy  VS and labs reviewed, Labs showed improvement in AST/ALT and normalization of T  Bilirubin  Gi service will now sign off  Thank you for letting us participate in care of this patient  Please call as needed  To be discussed with attending

## 2020-09-23 NOTE — PROGRESS NOTES
Progress Note - General Surgery   Governor Ends 26 y o  female MRN: [id number]  Unit/Bed#: OR POOL Encounter: [id number]    Assessment:  27 yo female with choledocolithiasis POD 1 from ERCP, plan for lap cholecystectomy today  Plan:  OR today for laparoscopic cholecystectomy    Continue antibiotics    Continue IVF    NPO    Follow up labs    Subjective/Objective   Chief Complaint: Abdominal pain    Subjective: Mild nausea and RUQ pain overnight  No episodes of vomiting  Having bowel movements and passing flatus  Denies fevers, chills, chest pain, shortness of breath  Objective: Physical exam    Blood pressure 94/56, pulse 55, temperature 98 5 °F (36 9 °C), resp  rate 18, height 5' 3" (1 6 m), weight 68 kg (150 lb), last menstrual period 09/18/2020, SpO2 100 %  ,Body mass index is 26 57 kg/m²  Intake/Output Summary (Last 24 hours) at 9/23/2020 0851  Last data filed at 9/23/2020 8608  Gross per 24 hour   Intake 500 ml   Output 600 ml   Net -100 ml       Invasive Devices     Peripheral Intravenous Line            Peripheral IV 09/21/20 Left Antecubital 2 days                Physical Exam: General appearance: alert and oriented, in no acute distress  Lungs: clear to auscultation bilaterally  Heart: regular rate and rhythm  Abdomen: Bowel sounds present, tenderness in RUQ to deep palpation, no rebound tenderness or guarding      Lab, Imaging and other studies:  CBC:   Lab Results   Component Value Date    WBC 2 79 (L) 09/23/2020    HGB 6 9 (LL) 09/23/2020    HCT 24 8 (L) 09/23/2020    MCV 77 (L) 09/23/2020     09/23/2020    MCH 21 4 (L) 09/23/2020    MCHC 27 8 (L) 09/23/2020    RDW 16 6 (H) 09/23/2020    MPV 11 5 09/23/2020    NRBC 0 09/23/2020   , CMP:   Lab Results   Component Value Date    SODIUM 142 09/23/2020    K 3 9 09/23/2020     09/23/2020    CO2 26 09/23/2020    BUN 5 09/23/2020    CREATININE 0 46 (L) 09/23/2020    CALCIUM 8 5 09/23/2020    AST 81 (H) 09/23/2020     (H) 09/23/2020    ALKPHOS 77 09/23/2020    EGFR 134 09/23/2020     VTE Pharmacologic Prophylaxis: Heparin  VTE Mechanical Prophylaxis: sequential compression device

## 2020-09-24 ENCOUNTER — APPOINTMENT (INPATIENT)
Dept: RADIOLOGY | Facility: HOSPITAL | Age: 30
DRG: 263 | End: 2020-09-24
Payer: COMMERCIAL

## 2020-09-24 ENCOUNTER — TELEPHONE (OUTPATIENT)
Dept: GASTROENTEROLOGY | Facility: CLINIC | Age: 30
End: 2020-09-24

## 2020-09-24 LAB
ABO GROUP BLD BPU: NORMAL
ALBUMIN SERPL BCP-MCNC: 3.4 G/DL (ref 3.5–5)
ALP SERPL-CCNC: 102 U/L (ref 46–116)
ALT SERPL W P-5'-P-CCNC: 159 U/L (ref 12–78)
ANION GAP SERPL CALCULATED.3IONS-SCNC: 8 MMOL/L (ref 4–13)
AST SERPL W P-5'-P-CCNC: 151 U/L (ref 5–45)
BILIRUB DIRECT SERPL-MCNC: 4.2 MG/DL (ref 0–0.2)
BILIRUB SERPL-MCNC: 5.8 MG/DL (ref 0.2–1)
BPU ID: NORMAL
BUN SERPL-MCNC: 4 MG/DL (ref 5–25)
CALCIUM ALBUM COR SERPL-MCNC: 9.5 MG/DL (ref 8.3–10.1)
CALCIUM SERPL-MCNC: 9 MG/DL (ref 8.3–10.1)
CHLORIDE SERPL-SCNC: 105 MMOL/L (ref 100–108)
CO2 SERPL-SCNC: 24 MMOL/L (ref 21–32)
CREAT SERPL-MCNC: 0.56 MG/DL (ref 0.6–1.3)
CROSSMATCH: NORMAL
ERYTHROCYTE [DISTWIDTH] IN BLOOD BY AUTOMATED COUNT: 16.8 % (ref 11.6–15.1)
GFR SERPL CREATININE-BSD FRML MDRD: 126 ML/MIN/1.73SQ M
GLUCOSE SERPL-MCNC: 76 MG/DL (ref 65–140)
HCT VFR BLD AUTO: 35.6 % (ref 34.8–46.1)
HGB BLD-MCNC: 10.2 G/DL (ref 11.5–15.4)
LIPASE SERPL-CCNC: 75 U/L (ref 73–393)
MCH RBC QN AUTO: 22.7 PG (ref 26.8–34.3)
MCHC RBC AUTO-ENTMCNC: 28.7 G/DL (ref 31.4–37.4)
MCV RBC AUTO: 79 FL (ref 82–98)
PLATELET # BLD AUTO: 317 THOUSANDS/UL (ref 149–390)
POTASSIUM SERPL-SCNC: 4.2 MMOL/L (ref 3.5–5.3)
PROT SERPL-MCNC: 6.9 G/DL (ref 6.4–8.2)
RBC # BLD AUTO: 4.5 MILLION/UL (ref 3.81–5.12)
SODIUM SERPL-SCNC: 137 MMOL/L (ref 136–145)
UNIT DISPENSE STATUS: NORMAL
UNIT PRODUCT CODE: NORMAL
UNIT RH: NORMAL
WBC # BLD AUTO: 5.33 THOUSAND/UL (ref 4.31–10.16)

## 2020-09-24 PROCEDURE — 82248 BILIRUBIN DIRECT: CPT | Performed by: SURGERY

## 2020-09-24 PROCEDURE — G1004 CDSM NDSC: HCPCS

## 2020-09-24 PROCEDURE — 74181 MRI ABDOMEN W/O CONTRAST: CPT

## 2020-09-24 PROCEDURE — 85027 COMPLETE CBC AUTOMATED: CPT | Performed by: SURGERY

## 2020-09-24 PROCEDURE — 80053 COMPREHEN METABOLIC PANEL: CPT | Performed by: SURGERY

## 2020-09-24 PROCEDURE — 83690 ASSAY OF LIPASE: CPT | Performed by: SURGERY

## 2020-09-24 RX ORDER — PROMETHAZINE HYDROCHLORIDE 25 MG/ML
12.5 INJECTION, SOLUTION INTRAMUSCULAR; INTRAVENOUS ONCE
Status: COMPLETED | OUTPATIENT
Start: 2020-09-24 | End: 2020-09-24

## 2020-09-24 RX ORDER — ONDANSETRON 4 MG/1
4 TABLET, ORALLY DISINTEGRATING ORAL EVERY 6 HOURS PRN
Status: DISCONTINUED | OUTPATIENT
Start: 2020-09-24 | End: 2020-09-24

## 2020-09-24 RX ORDER — ONDANSETRON 2 MG/ML
4 INJECTION INTRAMUSCULAR; INTRAVENOUS EVERY 4 HOURS PRN
Status: DISCONTINUED | OUTPATIENT
Start: 2020-09-24 | End: 2020-09-26 | Stop reason: HOSPADM

## 2020-09-24 RX ADMIN — METHOCARBAMOL TABLETS 500 MG: 500 TABLET, COATED ORAL at 05:40

## 2020-09-24 RX ADMIN — ONDANSETRON 4 MG: 4 TABLET, ORALLY DISINTEGRATING ORAL at 05:49

## 2020-09-24 RX ADMIN — OXYCODONE HYDROCHLORIDE 5 MG: 5 TABLET ORAL at 19:42

## 2020-09-24 RX ADMIN — ONDANSETRON 4 MG: 4 TABLET, ORALLY DISINTEGRATING ORAL at 20:24

## 2020-09-24 RX ADMIN — OXYCODONE HYDROCHLORIDE 10 MG: 10 TABLET ORAL at 10:33

## 2020-09-24 RX ADMIN — HEPARIN SODIUM 5000 UNITS: 5000 INJECTION INTRAVENOUS; SUBCUTANEOUS at 14:25

## 2020-09-24 RX ADMIN — ACETAMINOPHEN 650 MG: 325 TABLET, FILM COATED ORAL at 17:16

## 2020-09-24 RX ADMIN — SODIUM CHLORIDE, SODIUM LACTATE, POTASSIUM CHLORIDE, AND CALCIUM CHLORIDE 75 ML/HR: .6; .31; .03; .02 INJECTION, SOLUTION INTRAVENOUS at 17:19

## 2020-09-24 RX ADMIN — OXYCODONE HYDROCHLORIDE 5 MG: 5 TABLET ORAL at 05:39

## 2020-09-24 RX ADMIN — SODIUM CHLORIDE, SODIUM LACTATE, POTASSIUM CHLORIDE, AND CALCIUM CHLORIDE 75 ML/HR: .6; .31; .03; .02 INJECTION, SOLUTION INTRAVENOUS at 02:45

## 2020-09-24 RX ADMIN — PROMETHAZINE HYDROCHLORIDE 12.5 MG: 25 INJECTION INTRAMUSCULAR; INTRAVENOUS at 23:43

## 2020-09-24 RX ADMIN — METHOCARBAMOL TABLETS 500 MG: 500 TABLET, COATED ORAL at 17:16

## 2020-09-24 RX ADMIN — HEPARIN SODIUM 5000 UNITS: 5000 INJECTION INTRAVENOUS; SUBCUTANEOUS at 21:37

## 2020-09-24 RX ADMIN — HEPARIN SODIUM 5000 UNITS: 5000 INJECTION INTRAVENOUS; SUBCUTANEOUS at 05:39

## 2020-09-24 RX ADMIN — METHOCARBAMOL TABLETS 500 MG: 500 TABLET, COATED ORAL at 23:28

## 2020-09-24 RX ADMIN — METHOCARBAMOL TABLETS 500 MG: 500 TABLET, COATED ORAL at 11:32

## 2020-09-24 RX ADMIN — ACETAMINOPHEN 650 MG: 325 TABLET, FILM COATED ORAL at 11:32

## 2020-09-24 RX ADMIN — HYDROMORPHONE HYDROCHLORIDE 0.2 MG: 1 INJECTION, SOLUTION INTRAMUSCULAR; INTRAVENOUS; SUBCUTANEOUS at 23:27

## 2020-09-24 RX ADMIN — ACETAMINOPHEN 650 MG: 325 TABLET, FILM COATED ORAL at 05:40

## 2020-09-24 RX ADMIN — ACETAMINOPHEN 650 MG: 325 TABLET, FILM COATED ORAL at 23:28

## 2020-09-24 NOTE — QUICK NOTE
Nurse-Patient-Provider rounds were completed with the patient's nurse today, Mauro Thomas  We discussed the plan is to continue diet as tolerated  Continue to trend labs, specifically LFTs  Await MRI with MRCP  Continue current analgesic regimen  We reviewed all of the invasive devices/lines/telemetry orders   - None  DVT Prophylaxis:  - Subcutaneous heparin and SCDs  Pain Assessment / Plan:  - Continue current analgesic regimen  Mobility Assessment / Plan:  - Activity as tolerated  Goals / Barriers for discharge:  - Not appropriate for discharge today secondary to elevated total bilirubin; awaiting further workup  - Case management following; case and discharge needs discussed  All questions and concerns were addressed  I spent greater than 16 minutes reviewing the plan with the patient and the nurse, and coordinating care for the day      Jazmyn Sanchez PA-C  9/24/2020 12:16 PM

## 2020-09-24 NOTE — PROGRESS NOTES
Progress Note - General Surgery   Annabelle Ortega 26 y o  female MRN: [id number]  [facility] Encounter: [id number]    Assessment:  25 yo female POD 1 from lap choly, physical exam possibly concerning for ERCP related pancreatitis  Will monitor labs today and treat supportively  Plan:  Regular diet    Maintenance IVF    Continue analgesia    Will repeat labs tomorrow AM to trend T bili and lipase  Continue dispo planning    Up and out of bed    Subjective/Objective   Chief Complaint: Choledocolithiasis    Subjective: Pt complaining of continued right upper quadrant pain and right lower back pain, unchanged from yesterday  Some nausea overnight  patient tolerated some food such as jello and saltines but not much PO intake  No bowel movement or flatus at this time  Objective: Physical Exam    Blood pressure 112/67, pulse 66, temperature 98 8 °F (37 1 °C), resp  rate 20, height 5' 3" (1 6 m), weight 68 kg (150 lb), last menstrual period 09/18/2020, SpO2 99 %  ,Body mass index is 26 57 kg/m²  Intake/Output Summary (Last 24 hours) at 9/24/2020 0748  Last data filed at 9/24/2020 0244  Gross per 24 hour   Intake 2080 ml   Output 25 ml   Net 2055 ml       Invasive Devices     Peripheral Intravenous Line            Peripheral IV 09/21/20 Left Antecubital 3 days    Peripheral IV 09/23/20 Right Wrist less than 1 day                Physical Exam:   General appearance: alert and oriented, in no acute distress  Lungs: clear to auscultation bilaterally  Heart: regular rate and rhythm  Abdomen: Bowel sounds present, soft and nondistended, tender to palpation in RUQ, tender to palpation right subcostal region  No guarding, no rebound tenderness  Skin: Well healing laparoscopic surgical incisions present  No erythema or drainage      Lab, Imaging and other studies:  CBC:   Lab Results   Component Value Date    WBC 6 51 09/23/2020    HGB 9 1 (L) 09/23/2020    HCT 32 2 (L) 09/23/2020    MCV 79 (L) 09/23/2020  09/23/2020    MCH 22 4 (L) 09/23/2020    MCHC 28 3 (L) 09/23/2020    RDW 16 5 (H) 09/23/2020    MPV 10 5 09/23/2020    NRBC 0 09/23/2020     , CMP:   Lab Results   Component Value Date    SODIUM 137 09/24/2020    K 4 2 09/24/2020     09/24/2020    CO2 24 09/24/2020    BUN 4 (L) 09/24/2020    CREATININE 0 56 (L) 09/24/2020    CALCIUM 9 0 09/24/2020     (H) 09/24/2020     (H) 09/24/2020    ALKPHOS 102 09/24/2020    EGFR 126 09/24/2020     , Lipase:   Lab Results   Component Value Date    LIPASE 75 09/24/2020     VTE Pharmacologic Prophylaxis: Heparin  VTE Mechanical Prophylaxis: sequential compression device

## 2020-09-25 LAB
ALBUMIN SERPL BCP-MCNC: 2.9 G/DL (ref 3.5–5)
ALP SERPL-CCNC: 85 U/L (ref 46–116)
ALT SERPL W P-5'-P-CCNC: 111 U/L (ref 12–78)
ANION GAP SERPL CALCULATED.3IONS-SCNC: 8 MMOL/L (ref 4–13)
AST SERPL W P-5'-P-CCNC: 76 U/L (ref 5–45)
BASOPHILS # BLD AUTO: 0.01 THOUSANDS/ΜL (ref 0–0.1)
BASOPHILS NFR BLD AUTO: 0 % (ref 0–1)
BILIRUB DIRECT SERPL-MCNC: 3.13 MG/DL (ref 0–0.2)
BILIRUB SERPL-MCNC: 3.69 MG/DL (ref 0.2–1)
BUN SERPL-MCNC: 4 MG/DL (ref 5–25)
CALCIUM SERPL-MCNC: 8.2 MG/DL (ref 8.3–10.1)
CHLORIDE SERPL-SCNC: 107 MMOL/L (ref 100–108)
CO2 SERPL-SCNC: 26 MMOL/L (ref 21–32)
CREAT SERPL-MCNC: 0.32 MG/DL (ref 0.6–1.3)
EOSINOPHIL # BLD AUTO: 0.02 THOUSAND/ΜL (ref 0–0.61)
EOSINOPHIL NFR BLD AUTO: 0 % (ref 0–6)
ERYTHROCYTE [DISTWIDTH] IN BLOOD BY AUTOMATED COUNT: 17.3 % (ref 11.6–15.1)
GFR SERPL CREATININE-BSD FRML MDRD: 151 ML/MIN/1.73SQ M
GLUCOSE SERPL-MCNC: 70 MG/DL (ref 65–140)
HCT VFR BLD AUTO: 30.3 % (ref 34.8–46.1)
HGB BLD-MCNC: 8.4 G/DL (ref 11.5–15.4)
IMM GRANULOCYTES # BLD AUTO: 0.01 THOUSAND/UL (ref 0–0.2)
IMM GRANULOCYTES NFR BLD AUTO: 0 % (ref 0–2)
LYMPHOCYTES # BLD AUTO: 0.9 THOUSANDS/ΜL (ref 0.6–4.47)
LYMPHOCYTES NFR BLD AUTO: 20 % (ref 14–44)
MCH RBC QN AUTO: 22.1 PG (ref 26.8–34.3)
MCHC RBC AUTO-ENTMCNC: 27.7 G/DL (ref 31.4–37.4)
MCV RBC AUTO: 80 FL (ref 82–98)
MONOCYTES # BLD AUTO: 0.41 THOUSAND/ΜL (ref 0.17–1.22)
MONOCYTES NFR BLD AUTO: 9 % (ref 4–12)
NEUTROPHILS # BLD AUTO: 3.16 THOUSANDS/ΜL (ref 1.85–7.62)
NEUTS SEG NFR BLD AUTO: 71 % (ref 43–75)
NRBC BLD AUTO-RTO: 0 /100 WBCS
PLATELET # BLD AUTO: 229 THOUSANDS/UL (ref 149–390)
PMV BLD AUTO: 10.9 FL (ref 8.9–12.7)
POTASSIUM SERPL-SCNC: 3.9 MMOL/L (ref 3.5–5.3)
PROT SERPL-MCNC: 5.4 G/DL (ref 6.4–8.2)
RBC # BLD AUTO: 3.8 MILLION/UL (ref 3.81–5.12)
SODIUM SERPL-SCNC: 141 MMOL/L (ref 136–145)
WBC # BLD AUTO: 4.51 THOUSAND/UL (ref 4.31–10.16)

## 2020-09-25 PROCEDURE — 80048 BASIC METABOLIC PNL TOTAL CA: CPT | Performed by: SURGERY

## 2020-09-25 PROCEDURE — 99232 SBSQ HOSP IP/OBS MODERATE 35: CPT | Performed by: INTERNAL MEDICINE

## 2020-09-25 PROCEDURE — 80076 HEPATIC FUNCTION PANEL: CPT | Performed by: SURGERY

## 2020-09-25 PROCEDURE — 85025 COMPLETE CBC W/AUTO DIFF WBC: CPT | Performed by: SURGERY

## 2020-09-25 RX ORDER — SCOLOPAMINE TRANSDERMAL SYSTEM 1 MG/1
1 PATCH, EXTENDED RELEASE TRANSDERMAL
Status: DISCONTINUED | OUTPATIENT
Start: 2020-09-25 | End: 2020-09-26 | Stop reason: HOSPADM

## 2020-09-25 RX ADMIN — HEPARIN SODIUM 5000 UNITS: 5000 INJECTION INTRAVENOUS; SUBCUTANEOUS at 05:41

## 2020-09-25 RX ADMIN — METHOCARBAMOL TABLETS 500 MG: 500 TABLET, COATED ORAL at 05:41

## 2020-09-25 RX ADMIN — METHOCARBAMOL TABLETS 500 MG: 500 TABLET, COATED ORAL at 18:35

## 2020-09-25 RX ADMIN — ACETAMINOPHEN 650 MG: 325 TABLET, FILM COATED ORAL at 23:42

## 2020-09-25 RX ADMIN — SCOPALAMINE 1 PATCH: 1 PATCH, EXTENDED RELEASE TRANSDERMAL at 09:31

## 2020-09-25 RX ADMIN — METHOCARBAMOL TABLETS 500 MG: 500 TABLET, COATED ORAL at 11:45

## 2020-09-25 RX ADMIN — METHOCARBAMOL TABLETS 500 MG: 500 TABLET, COATED ORAL at 23:42

## 2020-09-25 RX ADMIN — ACETAMINOPHEN 650 MG: 325 TABLET, FILM COATED ORAL at 18:34

## 2020-09-25 RX ADMIN — ACETAMINOPHEN 650 MG: 325 TABLET, FILM COATED ORAL at 05:41

## 2020-09-25 RX ADMIN — HEPARIN SODIUM 5000 UNITS: 5000 INJECTION INTRAVENOUS; SUBCUTANEOUS at 13:36

## 2020-09-25 RX ADMIN — HEPARIN SODIUM 5000 UNITS: 5000 INJECTION INTRAVENOUS; SUBCUTANEOUS at 22:51

## 2020-09-25 RX ADMIN — ACETAMINOPHEN 650 MG: 325 TABLET, FILM COATED ORAL at 11:42

## 2020-09-25 RX ADMIN — SODIUM CHLORIDE, SODIUM LACTATE, POTASSIUM CHLORIDE, AND CALCIUM CHLORIDE 75 ML/HR: .6; .31; .03; .02 INJECTION, SOLUTION INTRAVENOUS at 05:42

## 2020-09-25 NOTE — PROGRESS NOTES
Progress Note - General Surgery   Krystal Saldana 26 y o  female MRN: [id number]  Unit/Bed#: [facility] 823-01 Encounter: [id number]    Assessment:  25 yo female POD 2  from lap choly, still somewhat tender  Lipase normal, T bili significantly elevated  No output recorded    Plan:  Lo-fat diet  Discontinue IVF  F/u MRCP  Reinforce importance of strict I and O with nursing staff  PRN analgesia  Ambulate  SQH dvt ppx    Subjective/Objective     Subjective: Feels well, endorses nausea and some emesis      Objective:     Vitals: Temp:  [98 2 °F (36 8 °C)-99 °F (37 2 °C)] 99 °F (37 2 °C)  HR:  [64-73] 64  Resp:  [18] 18  BP: (111-119)/(66-76) 119/76  Body mass index is 26 57 kg/m²  I/O       09/23 0701 - 09/24 0700 09/24 0701 - 09/25 0700    P  O  120 120    I V  (mL/kg) 1660 (24 4) 1888 8 (27 8)    Blood 300     Total Intake(mL/kg) 2080 (30 6) 2008 8 (29 5)    Blood 25     Total Output 25     Net +2055 +2008 8                Physical Exam:  GEN: NAD  HEENT: MMM  CV: RRR  Lung: Normal effort  Ab: Soft, ND, mild LLQ/RLQ tenderness, moderate epigastric and RUQ tenderness, no LUQ tenderness  Extrem: No CCE  Neuro: A+Ox3  No scleral icterus    Lab, Imaging and other studies: I have personally reviewed pertinent reports    , CBC with diff:   Lab Results   Component Value Date    WBC 5 33 09/24/2020    HGB 10 2 (L) 09/24/2020    HCT 35 6 09/24/2020    MCV 79 (L) 09/24/2020     09/24/2020    MCH 22 7 (L) 09/24/2020    MCHC 28 7 (L) 09/24/2020    RDW 16 8 (H) 09/24/2020   , BMP/CMP:   Lab Results   Component Value Date    SODIUM 137 09/24/2020    K 4 2 09/24/2020     09/24/2020    CO2 24 09/24/2020    BUN 4 (L) 09/24/2020    CREATININE 0 56 (L) 09/24/2020    CALCIUM 9 0 09/24/2020     (H) 09/24/2020     (H) 09/24/2020    ALKPHOS 102 09/24/2020    EGFR 126 09/24/2020     VTE Pharmacologic Prophylaxis: Heparin  VTE Mechanical Prophylaxis: sequential compression device    No new Assessment & Plan notes have been filed under this hospital service since the last note was generated    Service: Toby Naranjo MD  9/25/2020 5:54 AM

## 2020-09-25 NOTE — PROGRESS NOTES
Progress Note - [facility]  Janine Desai 26 y o  female MRN: [id number]  Unit/Bed#: [facility] 823-01 Encounter: [id number]      ASSESSMENT AND PLAN:    1  Choledocolithiasis, and symptomatic cholelithiasis s/p ERCP and lap cholecystectomy  2  Hyperbilirubinemia post procedure, likely due to possible bile leak vs passage of microlithiasis  - MRCP on 9/24 did not show any residual choledocolithiasis  - She is starting to feel better and is tolerating a regular diet    - T  Bilirubin levels are down trending    - Will recommend to continue monitoring T  Bilirubin, AST/ALT levels  - Pain management per primary team      3  H/o iron deficiency anemia, likely from h/o menorrhagia, but is scheduled for outpatient EGD/C-scope on 10/8/2020 to r/o GI source of anemia    4  H/o gastric sleeve surgery in Nov 2019  GI will follow from periphery  Please call / notify as needed  ______________________________________________________________________    SUBJECTIVE:     Pt evaluated at bedside  She reports feeling better  Has mild pain at the site of the lap but is improving  She is eating 25 % of her meal tray without any difficulty  She is passing gas, but has not had a bowel movement yet  No fevers overnight       Medication Administration - last 24 hours from 09/24/2020 0923 to 09/25/2020 8243       Date/Time Order Dose Route Action Action by     09/25/2020 0541 heparin (porcine) subcutaneous injection 5,000 Units 5,000 Units Subcutaneous Given Camille Sabillon RN     09/24/2020 2137 heparin (porcine) subcutaneous injection 5,000 Units 5,000 Units Subcutaneous Given Camille Sabillon RN     09/24/2020 1425 heparin (porcine) subcutaneous injection 5,000 Units 5,000 Units Subcutaneous Given Kirsten Fischer RN     09/24/2020 1033 oxyCODONE (ROXICODONE) immediate release tablet 10 mg 10 mg Oral Given Kirsten Fischer RN     09/24/2020 1634 HYDROmorphone (DILAUDID) injection 0 2 mg 0 2 mg Intravenous Given Lor Correa RN     09/24/2020 1942 oxyCODONE (ROXICODONE) IR tablet 5 mg 5 mg Oral Given Lor Correa RN     09/25/2020 0541 acetaminophen (TYLENOL) tablet 650 mg 650 mg Oral Given Lor Correa RN     09/24/2020 2328 acetaminophen (TYLENOL) tablet 650 mg 650 mg Oral Given Lor Correa RN     09/24/2020 1716 acetaminophen (TYLENOL) tablet 650 mg 650 mg Oral Given Miguelangel Willson RN     09/24/2020 1132 acetaminophen (TYLENOL) tablet 650 mg 650 mg Oral Given Mercy Gift, RN     09/25/2020 0541 methocarbamol (ROBAXIN) tablet 500 mg 500 mg Oral Given Lor Correa RN     09/24/2020 2328 methocarbamol (ROBAXIN) tablet 500 mg 500 mg Oral Given Lor Correa RN     09/24/2020 1716 methocarbamol (ROBAXIN) tablet 500 mg 500 mg Oral Given Miguelangel Willson RN     09/24/2020 1132 methocarbamol (ROBAXIN) tablet 500 mg 500 mg Oral Given Mercy Gift, RN     09/25/2020 0542 lactated ringers infusion 75 mL/hr Intravenous [address], RN     09/25/2020 0541 lactated ringers infusion 0 mL/hr Intravenous Stopped Lor Correa RN     09/24/2020 2141 lactated ringers infusion 75 mL/hr Intravenous Restarted Lor Correa RN     09/24/2020 1958 lactated ringers infusion 0 mL/hr Intravenous Hold Lor Correa RN     09/24/2020 1719 lactated ringers infusion 75 mL/hr Intravenous New Bag Miguelangel Willson RN     09/24/2020 1717 lactated ringers infusion 0 mL/hr Intravenous Stopped Miguelangel Willson RN     09/24/2020 2024 ondansetron (ZOFRAN-ODT) dispersible tablet 4 mg 4 mg Oral Given Lor Correa RN     09/24/2020 2343 promethazine (PHENERGAN) injection 12 5 mg 12 5 mg Intravenous Given Lor Correa RN          OBJECTIVE:     Objective   Blood pressure 101/51, pulse 72, temperature 99 4 °F (37 4 °C), resp  rate 16, height 5' 3" (1 6 m), weight 68 kg (150 lb), last menstrual period 09/18/2020, SpO2 97 %  Body mass index is 26 57 kg/m²      Intake/Output Summary (Last 24 hours) at 9/25/2020 0923  Last data filed at 9/25/2020 0541  Gross per 24 hour   Intake 1888 75 ml   Output 0 ml   Net 1888 75 ml       PHYSICAL EXAM:   General Appearance: Awake and alert, in no acute distress  Abdomen: Soft,  Lap scars appear clean and dry  Mild overlying tenderness  Non distended  Invasive Devices     Peripheral Intravenous Line            Peripheral IV 09/23/20 Right Wrist 2 days                LAB RESULTS:  No results displayed because visit has over 200 results  RADIOLOGY RESULTS: I have personally reviewed pertinent imaging studies  MRCP - 9/25/2020   IMPRESSION:     No residual choledocholithiasis  Mild prominence of intrahepatic biliary tree and common bile duct, improved when compared with September 21, 2020 and probably within normal limits in this patient who has undergone recent cholecystectomy      Small moderate volume of abdominopelvic ascites without wall of collection  Trace costophrenic angle pleural effusions

## 2020-09-26 VITALS
HEART RATE: 62 BPM | WEIGHT: 150 LBS | SYSTOLIC BLOOD PRESSURE: 102 MMHG | HEIGHT: 63 IN | OXYGEN SATURATION: 98 % | DIASTOLIC BLOOD PRESSURE: 57 MMHG | BODY MASS INDEX: 26.58 KG/M2 | TEMPERATURE: 98.3 F | RESPIRATION RATE: 16 BRPM

## 2020-09-26 LAB
ALBUMIN SERPL BCP-MCNC: 2.9 G/DL (ref 3.5–5)
ALP SERPL-CCNC: 82 U/L (ref 46–116)
ALT SERPL W P-5'-P-CCNC: 96 U/L (ref 12–78)
AST SERPL W P-5'-P-CCNC: 55 U/L (ref 5–45)
BILIRUB DIRECT SERPL-MCNC: 0.7 MG/DL (ref 0–0.2)
BILIRUB SERPL-MCNC: 1.04 MG/DL (ref 0.2–1)
PROT SERPL-MCNC: 5.5 G/DL (ref 6.4–8.2)

## 2020-09-26 PROCEDURE — NC001 PR NO CHARGE: Performed by: SURGERY

## 2020-09-26 PROCEDURE — 80076 HEPATIC FUNCTION PANEL: CPT | Performed by: PHYSICIAN ASSISTANT

## 2020-09-26 RX ORDER — OXYCODONE HYDROCHLORIDE 5 MG/1
5 TABLET ORAL EVERY 4 HOURS PRN
Qty: 10 TABLET | Refills: 0 | Status: SHIPPED | OUTPATIENT
Start: 2020-09-26 | End: 2020-10-06

## 2020-09-26 RX ADMIN — ACETAMINOPHEN 650 MG: 325 TABLET, FILM COATED ORAL at 11:11

## 2020-09-26 RX ADMIN — METHOCARBAMOL TABLETS 500 MG: 500 TABLET, COATED ORAL at 05:21

## 2020-09-26 RX ADMIN — HEPARIN SODIUM 5000 UNITS: 5000 INJECTION INTRAVENOUS; SUBCUTANEOUS at 05:21

## 2020-09-26 RX ADMIN — ACETAMINOPHEN 650 MG: 325 TABLET, FILM COATED ORAL at 05:21

## 2020-09-26 RX ADMIN — METHOCARBAMOL TABLETS 500 MG: 500 TABLET, COATED ORAL at 11:11

## 2020-09-26 NOTE — PLAN OF CARE
Problem: PAIN - ADULT  Goal: Verbalizes/displays adequate comfort level or baseline comfort level  Description: Interventions:  - Encourage patient to monitor pain and request assistance  - Assess pain using appropriate pain scale  - Administer analgesics based on type and severity of pain and evaluate response  - Implement non-pharmacological measures as appropriate and evaluate response  - Consider cultural and social influences on pain and pain management  - Notify physician/advanced practitioner if interventions unsuccessful or patient reports new pain  Outcome: Progressing     Problem: INFECTION - ADULT  Goal: Absence or prevention of progression during hospitalization  Description: INTERVENTIONS:  - Assess and monitor for signs and symptoms of infection  - Monitor lab/diagnostic results  - Monitor all insertion sites, i e  indwelling lines, tubes, and drains  - Monitor endotracheal if appropriate and nasal secretions for changes in amount and color  - [place] appropriate cooling/warming therapies per order  - Administer medications as ordered  - Instruct and encourage patient and family to use good hand hygiene technique  - Identify and instruct in appropriate isolation precautions for identified infection/condition  Outcome: Progressing  Goal: Absence of fever/infection during neutropenic period  Description: INTERVENTIONS:  - Monitor WBC    Outcome: Progressing     Problem: SAFETY ADULT  Goal: Patient will remain free of falls  Description: INTERVENTIONS:  - Assess patient frequently for physical needs  -  Identify cognitive and physical deficits and behaviors that affect risk of falls    -  [place] fall precautions as indicated by assessment   - Educate patient/family on patient safety including physical limitations  - Instruct patient to call for assistance with activity based on assessment  - Modify environment to reduce risk of injury  - Consider OT/PT consult to assist with strengthening/mobility  Outcome: Progressing  Goal: Maintain or return to baseline ADL function  Description: INTERVENTIONS:  -  Assess patient's ability to carry out ADLs; assess patient's baseline for ADL function and identify physical deficits which impact ability to perform ADLs (bathing, care of mouth/teeth, toileting, grooming, dressing, etc )  - Assess/evaluate cause of self-care deficits   - Assess range of motion  - Assess patient's mobility; develop plan if impaired  - Assess patient's need for assistive devices and provide as appropriate  - Encourage maximum independence but intervene and supervise when necessary  - Involve family in performance of ADLs  - Assess for home care needs following discharge   - Consider OT consult to assist with ADL evaluation and planning for discharge  - Provide patient education as appropriate  Outcome: Progressing  Goal: Maintain or return mobility status to optimal level  Description: INTERVENTIONS:  - Assess patient's baseline mobility status (ambulation, transfers, stairs, etc )    - Identify cognitive and physical deficits and behaviors that affect mobility  - Identify mobility aids required to assist with transfers and/or ambulation (gait belt, sit-to-stand, lift, walker, cane, etc )  - [place] fall precautions as indicated by assessment  - Record patient progress and toleration of activity level on Mobility SBAR; progress patient to next Phase/Stage  - Instruct patient to call for assistance with activity based on assessment  - Consider rehabilitation consult to assist with strengthening/weightbearing, etc   Outcome: Progressing     Problem: DISCHARGE PLANNING  Goal: Discharge to home or other facility with appropriate resources  Description: INTERVENTIONS:  - Identify barriers to discharge w/patient and caregiver  - Arrange for needed discharge resources and transportation as appropriate  - Identify discharge learning needs (meds, wound care, etc )  - Arrange for interpretive services to assist at discharge as needed  - Refer to Case Management Department for coordinating discharge planning if the patient needs post-hospital services based on physician/advanced practitioner order or complex needs related to functional status, cognitive ability, or social support system  Outcome: Progressing     Problem: Knowledge Deficit  Goal: Patient/family/caregiver demonstrates understanding of disease process, treatment plan, medications, and discharge instructions  Description: Complete learning assessment and assess knowledge base  Interventions:  - Provide teaching at level of understanding  - Provide teaching via preferred learning methods  Outcome: Progressing     Problem: Potential for Falls  Goal: Patient will remain free of falls  Description: INTERVENTIONS:  - Assess patient frequently for physical needs  -  Identify cognitive and physical deficits and behaviors that affect risk of falls    -  [place] fall precautions as indicated by assessment   - Educate patient/family on patient safety including physical limitations  - Instruct patient to call for assistance with activity based on assessment  - Modify environment to reduce risk of injury  - Consider OT/PT consult to assist with strengthening/mobility  Outcome: Progressing

## 2020-09-26 NOTE — DISCHARGE INSTRUCTIONS
Laparoscopic Cholecystectomy   WHAT YOU NEED TO KNOW:   Laparoscopic cholecystectomy is surgery to remove your gallbladder  DISCHARGE INSTRUCTIONS:   Medicines: You may need any of the following:  · Prescription pain medicine  helps decrease pain  Do not wait until the pain is severe before you take this medicine  · NSAIDs  decrease swelling and pain  This medicine can be bought with or without a doctor's order  This medicine can cause stomach bleeding or kidney problems in certain people  If you take blood thinner medicine, always ask your healthcare provider if NSAIDs are safe for you  Read the medicine label and follow the directions on it before using this medicine  · Take your medicine as directed  Contact your healthcare provider if you think your medicine is not helping or if you have side effects  Tell him or her if you are allergic to any medicine  Keep a list of the medicines, vitamins, and herbs you take  Include the amounts, and when and why you take them  Bring the list or the pill bottles to follow-up visits  Carry your medicine list with you in case of an emergency  Follow up with your healthcare provider 2 weeks after surgery, or as directed:  Write down your questions so you remember to ask them during your visits  Wound care:  Care for your surgical wounds as directed  Keep the wounds clean and dry  You may take a shower the day after your surgery  What to eat after surgery:  Eat low-fat foods for 4 to 6 weeks while your body learns to digest fat without a gallbladder  Slowly increase the amount of fat that you eat  Drink plenty of liquids  Ask how much liquid to drink and which liquids are best for you  When to return to work and other activities: You may return to work or other activities as soon as your pain is controlled and you feel comfortable  For many people, this is 5 to 7 days after surgery     Contact your healthcare provider if:   · You have a fever over 101°F (38°C) or chills  · You have pain or nausea that is not relieved by medicine  · You have redness and swelling around your incisions, or blood or pus is leaking from your incisions  · You are constipated or have diarrhea  · Your skin or eyes are yellow, or your bowel movements are pale  · You have questions or concerns about your surgery, condition, or care  Seek care immediately or call 911 if:   · You cannot stop vomiting  · Your bowel movements are black or bloody  · You have pain in your abdomen and it is swollen or hard  · Your arm or leg feels warm, tender, and painful  It may look swollen and red  · You feel lightheaded, short of breath, and have chest pain  · You cough up blood  © 2017 [employer]  Information is for End User's use only and may not be sold, redistributed or otherwise used for commercial purposes  All illustrations and images included in CareNotes® are the copyrighted property of A D A M , Inc  or [employer]  The above information is an  only  It is not intended as medical advice for individual conditions or treatments  Talk to your doctor, nurse or pharmacist before following any medical regimen to see if it is safe and effective for you

## 2020-09-26 NOTE — PROGRESS NOTES
Progress Note - General Surgery   Arthurine Tarzana 31 y o  female MRN: [id number]  Unit/Bed#: [facility] 823-01 Encounter: [id number]    Assessment:  25 yo female POD 3 from [facility] for acute cholecystitis and choledocholithiasis  AVSS  Downtrend tbili 3 69 from 5 8  Hepatic function panel pending this morning  Plan:  Lo-fat diet  PRN analgesia  OOB/ambulate  Pending LFTs, patient can likely be discharged home today  SQH dvt ppx    Subjective/Objective     Subjective: No acute overnight events  Patient feels well this morning  Tolerating lo fat diet  She is ready to go home today  Objective:     Vitals: Temp:  [98 °F (36 7 °C)-99 4 °F (37 4 °C)] 98 4 °F (36 9 °C)  HR:  [66-72] 66  Resp:  [16-18] 17  BP: (101-104)/(51-57) 104/57  Body mass index is 26 57 kg/m²  I/O       09/23 0701 - 09/24 0700 09/24 0701 - 09/25 0700    P  O  120 120    I V  (mL/kg) 1660 (24 4) 1888 8 (27 8)    Blood 300     Total Intake(mL/kg) 2080 (30 6) 2008 8 (29 5)    Blood 25     Total Output 25     Net +2055 +2008 8                Physical Exam:  GEN: NAD  HEENT: MMM, NCAT  CV: RRR  s1 and s2 noted  Lung: Normal effort  Ab: Soft, ND, Mild RUQ tenderness  Incisions c/d/i  Extrem: No edema  Neuro: A+Ox3  No scleral icterus    Lab, Imaging and other studies: I have personally reviewed pertinent reports  , CBC with diff:   No results found for: WBC, HGB, HCT, MCV, PLT, ADJUSTEDWBC, MCH, MCHC, RDW, MPV, NRBC, BMP/CMP:   No results found for: SODIUM, K, CL, CO2, ANIONGAP, BUN, CREATININE, GLUCOSE, CALCIUM, AST, ALT, ALKPHOS, PROT, BILITOT, EGFR  VTE Pharmacologic Prophylaxis: Heparin  VTE Mechanical Prophylaxis: sequential compression device    No new Assessment & Plan notes have been filed under this hospital service since the last note was generated    Service: Ted Fine DO  9/26/2020 7:08 AM

## 2020-09-30 DIAGNOSIS — K80.50 CHOLEDOCHOLITHIASIS: ICD-10-CM

## 2020-10-01 NOTE — UTILIZATION REVIEW
Notification of Discharge  This is a Notification of Discharge from our facility [address]  Please be advised that this patient has been discharge from our facility  Below you will find the admission and discharge date and time including the patients disposition  PRESENTATION DATE: 9/21/2020 12:56 AM  OBS ADMISSION DATE:   IP ADMISSION DATE: 9/21/20 0234   DISCHARGE DATE: 9/26/2020 11:59 AM  DISPOSITION: Home/Self Care Home/Self Care   Admission Orders listed below:  Admission Orders (From admission, onward)     Ordered        09/21/20 0236  Inpatient Admission  Once                   Please contact the UR Department if additional information is required to close this patient's authorization/case  [address] [employer] Utilization Review Department  Main: [phone] x carefully listen to the prompts  All voicemails are confidential   [email or web address]  Send all requests for admission clinical reviews, approved or denied determinations and any other requests to dedicated fax number below belonging to the campus where the patient is receiving treatment   List of dedicated fax numbers:  [address] DENIALS (Administrative/Medical Necessity) [phone]   [address] (Maternity/NICU/Pediatrics) [phone]   Abiodun Everett [phone]   Cherylene Freud [phone]   Juliocesar Faith [phone]   [facility] [address] [place] [address] [phone]   [facility]  [phone]   [address] [facility] [address] [facility]  [address] [facility] [address] [facility] [phone]

## 2020-10-02 RX ORDER — ACETAMINOPHEN 325 MG/1
650 TABLET ORAL EVERY 6 HOURS PRN
Qty: 30 TABLET | Refills: 0 | OUTPATIENT
Start: 2020-10-02

## 2020-10-02 RX ORDER — CEPHALEXIN 500 MG/1
500 CAPSULE ORAL EVERY 6 HOURS SCHEDULED
Qty: 20 CAPSULE | Refills: 0 | OUTPATIENT
Start: 2020-10-02 | End: 2020-10-07

## 2020-10-02 RX ORDER — METRONIDAZOLE 500 MG/1
500 TABLET ORAL EVERY 8 HOURS SCHEDULED
Qty: 15 TABLET | Refills: 0 | OUTPATIENT
Start: 2020-10-02 | End: 2020-10-07

## 2020-10-19 NOTE — DISCHARGE SUMMARY
Discharge Summary - General Surgery  Burns Severin 31 y o  female MRN: [id number]  Unit/Bed#: [facility] 823-01 Encounter: [id number]      Date of admission: 9/21/2020  Date of discharge: 9/26/2020    Admitting diagnosis: Abdominal pain [R10 9]  Symptomatic cholelithiasis [K80 20]  Discharge diagnosis: acute cholecystitis, choledocholithiasis    HPI  Burns Severin is a 31 y o  female who presented to the hospital with symptomatic cholelithiasis  Hospital Course  She was found to have a dilated common bile duct on ultrasound  She underwent an MRCP which revealed acute cholecystitis and cholelithiasis  Gastroenterology was consulted and she underwent successful ERCP and sphincterotomy on HD 1  She underwent a laparoscopic cholecystectomy on HD 2  She was transferred to the floor post-operatively  She did have a jump in her bilirubin post-operatively, prompting a repeat MRCP, which showed no ductal injury and no residual choledocholithiasis  Her bilirubin did improve and she was ultimately discharged on 9/26 in stable condition  Condition at discharge: good      Current Vitals:   Blood Pressure: 102/57 (09/26/20 0720)  Pulse: 62 (09/26/20 0720)  Temperature: 98 3 °F (36 8 °C) (09/26/20 0720)  Temp Source: Oral (09/26/20 0720)  Respirations: 16 (09/26/20 0720)  Height: 5' 3" (160 cm) (09/21/20 1300)  Weight - Scale: 68 kg (150 lb) (09/21/20 1300)  SpO2: 98 % (09/25/20 2349)    No intake or output data in the 24 hours ending 10/19/20 0902    Lab Results: CBC: No results found for: WBC, HGB, HCT, MCV, PLT, ADJUSTEDWBC, MCH, MCHC, RDW, MPV, NRBC, CMP: No results found for: SODIUM, K, CL, CO2, ANIONGAP, BUN, CREATININE, GLUCOSE, CALCIUM, AST, ALT, ALKPHOS, PROT, BILITOT, EGFR  Imaging: I have personally reviewed pertinent reports  EKG, Pathology, and Other Studies: I have personally reviewed pertinent reports        Disposition: Home  Planned Readmission: No    Discharge Medications:  See after visit summary for reconciled discharge medications provided to patient and family  Discharge instructions/Information to patient and family:   See after visit summary for information provided to patient and family  Provisions for Follow-Up Care:  See after visit summary for information related to follow-up care and any pertinent home health orders  Discharge Statement   I spent 30 minutes discharging the patient  This time was spent on the day of discharge  I had direct contact with the patient on the day of discharge  Additional documentation is required if more than 30 minutes were spent on discharge

## 2021-03-02 DIAGNOSIS — K80.50 CHOLEDOCHOLITHIASIS: ICD-10-CM

## 2021-03-30 DIAGNOSIS — Z23 ENCOUNTER FOR IMMUNIZATION: ICD-10-CM

## 2021-04-01 ENCOUNTER — IMMUNIZATIONS (OUTPATIENT)
Dept: FAMILY MEDICINE CLINIC | Facility: HOSPITAL | Age: 31
End: 2021-04-01

## 2021-04-01 DIAGNOSIS — Z23 ENCOUNTER FOR IMMUNIZATION: Primary | ICD-10-CM

## 2021-04-01 PROCEDURE — 91300 SARS-COV-2 / COVID-19 MRNA VACCINE (PFIZER-BIONTECH) 30 MCG: CPT

## 2021-04-01 PROCEDURE — 0001A SARS-COV-2 / COVID-19 MRNA VACCINE (PFIZER-BIONTECH) 30 MCG: CPT

## 2021-04-22 ENCOUNTER — IMMUNIZATIONS (OUTPATIENT)
Dept: FAMILY MEDICINE CLINIC | Facility: HOSPITAL | Age: 31
End: 2021-04-22

## 2021-04-22 DIAGNOSIS — Z23 ENCOUNTER FOR IMMUNIZATION: Primary | ICD-10-CM

## 2021-04-22 PROCEDURE — 91300 SARS-COV-2 / COVID-19 MRNA VACCINE (PFIZER-BIONTECH) 30 MCG: CPT

## 2021-04-22 PROCEDURE — 0002A SARS-COV-2 / COVID-19 MRNA VACCINE (PFIZER-BIONTECH) 30 MCG: CPT

## 2021-05-07 RX ORDER — CEPHALEXIN 500 MG/1
500 CAPSULE ORAL EVERY 6 HOURS SCHEDULED
Qty: 20 CAPSULE | Refills: 0 | OUTPATIENT
Start: 2021-05-07 | End: 2021-05-12

## 2021-05-07 RX ORDER — ACETAMINOPHEN 325 MG/1
650 TABLET ORAL EVERY 6 HOURS PRN
Qty: 30 TABLET | Refills: 0 | OUTPATIENT
Start: 2021-05-07

## 2021-05-07 RX ORDER — METRONIDAZOLE 500 MG/1
500 TABLET ORAL EVERY 8 HOURS SCHEDULED
Qty: 15 TABLET | Refills: 0 | OUTPATIENT
Start: 2021-05-07 | End: 2021-05-12

## 2022-01-06 ENCOUNTER — OFFICE VISIT (OUTPATIENT)
Dept: OBGYN CLINIC | Facility: CLINIC | Age: 32
End: 2022-01-06
Payer: COMMERCIAL

## 2022-01-06 VITALS
WEIGHT: 163 LBS | SYSTOLIC BLOOD PRESSURE: 110 MMHG | BODY MASS INDEX: 28.88 KG/M2 | DIASTOLIC BLOOD PRESSURE: 70 MMHG | HEIGHT: 63 IN

## 2022-01-06 DIAGNOSIS — B37.9 CANDIDIASIS: Primary | ICD-10-CM

## 2022-01-06 PROCEDURE — 99203 OFFICE O/P NEW LOW 30 MIN: CPT | Performed by: OBSTETRICS & GYNECOLOGY

## 2022-01-06 RX ORDER — CLOTRIMAZOLE AND BETAMETHASONE DIPROPIONATE 10; .64 MG/G; MG/G
CREAM TOPICAL 2 TIMES DAILY
Qty: 30 G | Refills: 0 | Status: SHIPPED | OUTPATIENT
Start: 2022-01-06

## 2022-01-06 RX ORDER — HYDROGEN PEROXIDE 2.65 ML/100ML
LIQUID TOPICAL
COMMUNITY
Start: 2021-11-18 | End: 2022-07-01

## 2022-01-06 RX ORDER — FLUCONAZOLE 150 MG/1
150 TABLET ORAL
Qty: 2 TABLET | Refills: 2 | Status: SHIPPED | OUTPATIENT
Start: 2022-01-06 | End: 2022-01-10

## 2022-01-06 NOTE — PROGRESS NOTES
Assessment/Plan:  Cultures were obtained for Candida as well as bacteria vaginosis  She will start Diflucan 1 p o  then repeat in 3 days followed by 1 tablet Q 30 days for 3 consecutive months  She is also instructed to use Lotrisone cream prn  We discussed if this is unsuccessful would then try boric acid regiment  All questions answered at this time  She will call for results in 1 week as well as update  No problem-specific Assessment & Plan notes found for this encounter  Diagnoses and all orders for this visit:    Candidiasis  -     fluconazole (DIFLUCAN) 150 mg tablet; Take 1 tablet (150 mg total) by mouth every 3 (three) days for 2 doses  -     clotrimazole-betamethasone (LOTRISONE) 1-0 05 % cream; Apply topically 2 (two) times a day    Other orders  -     Miconazole 7 2 % vaginal cream;  (Patient not taking: Reported on 2022 )          Subjective:      Patient ID: Roni Patel is a 27 y o  female  HPI     This is a very pleasant 58-year-old female  ( x4 with tubal ligation, age 14, 14, 5, 4) presents as a new patient complaining of chronic external vulvar irritation, intermittently over the last 1-2 years  She denies any vaginal discharge  She was evaluated by her gyn and treated with antifungal topical   Her symptoms would improve and then come back after treatment  Patient has been in a monogamous relationship with her  for over 14 years  No history of STDs  Her Pap smears have been normal   Method of contraception is tubal ligation  She underwent laparoscopic sleeve with weight reduction 270 down to 155  She has maintained her weight  She has not been on a recent antibiotic      The following portions of the patient's history were reviewed and updated as appropriate: allergies, current medications, past family history, past medical history, past social history, past surgical history and problem list     Review of Systems   Constitutional: Negative for fatigue, fever and unexpected weight change  Respiratory: Negative for cough, chest tightness, shortness of breath and wheezing  Cardiovascular: Negative  Negative for chest pain and palpitations  Gastrointestinal: Negative  Negative for abdominal distention, abdominal pain, blood in stool, constipation, diarrhea, nausea and vomiting  Genitourinary: Negative  Negative for difficulty urinating, dyspareunia, dysuria, flank pain, frequency, genital sores, hematuria, pelvic pain, urgency, vaginal bleeding, vaginal discharge and vaginal pain  Skin: Negative for rash  Objective:      /70   Ht 5' 3" (1 6 m)   Wt 73 9 kg (163 lb)   LMP 12/06/2021   BMI 28 87 kg/m²          Physical Exam  Constitutional:       Appearance: Normal appearance  Abdominal:      General: Bowel sounds are normal  There is no distension  Palpations: Abdomen is soft  Tenderness: There is no abdominal tenderness  There is no guarding or rebound  Genitourinary:     Labia:         Right: No rash, tenderness, lesion or injury  Left: No rash, tenderness, lesion or injury  Vagina: No signs of injury  Erythema present  No vaginal discharge, tenderness, bleeding or lesions  Cervix: No cervical motion tenderness, discharge, friability, lesion, erythema or cervical bleeding  Uterus: Not enlarged and not tender  Adnexa:         Right: No mass, tenderness or fullness  Left: No mass, tenderness or fullness  Comments: External genitalia is evident of erythema bilaterally along labia, perineum  The vagina is well estrogenized  Scant adherent discharge consistent with candidiasis, cervix is small, scant spotting likely due for menses  There is no pelvic floor prolapse  Neurological:      Mental Status: She is alert and oriented to person, place, and time     Psychiatric:         Behavior: Behavior normal

## 2022-01-11 LAB
A VAGINAE DNA VAG NAA+PROBE-LOG#: 7.3 LOG (CELLS/ML)
C GLABRATA DNA VAG QL NAA+PROBE: NOT DETECTED
CANDIDA DNA VAG QL NAA+PROBE: DETECTED
G VAGINALIS DNA VAG NAA+PROBE-LOG#: 7.8 LOG (CELLS/ML)
LACTOBACILLUS DNA VAG NAA+PROBE-LOG#: 4.8 LOG (CELLS/ML)
MEGASPHAERA SP DNA VAG NAA+PROBE-LOG#: NOT DETECTED LOG CELLS/ML
SL AMB BV CATEGORY:: ABNORMAL
SL AMB C. PARAPSILOSIS, DNA: DETECTED
SL AMB C. TROPICALIS, DNA: NOT DETECTED
T VAGINALIS RRNA SPEC QL NAA+PROBE: NOT DETECTED

## 2022-01-12 ENCOUNTER — TELEPHONE (OUTPATIENT)
Dept: OBGYN CLINIC | Facility: CLINIC | Age: 32
End: 2022-01-12

## 2022-01-12 DIAGNOSIS — N76.0 BV (BACTERIAL VAGINOSIS): Primary | ICD-10-CM

## 2022-01-12 DIAGNOSIS — B96.89 BV (BACTERIAL VAGINOSIS): Primary | ICD-10-CM

## 2022-01-12 RX ORDER — METRONIDAZOLE 500 MG/1
500 TABLET ORAL EVERY 12 HOURS SCHEDULED
Qty: 14 TABLET | Refills: 0 | Status: SHIPPED | OUTPATIENT
Start: 2022-01-12 | End: 2022-01-19

## 2022-01-12 NOTE — TELEPHONE ENCOUNTER
----- Message from Lexis Machuca DO sent at 1/12/2022  8:06 AM EST -----  Inform patient cultures positive for Candida, equivocal BV  She was already given a prescription for Diflucan instructions for boric acid  Before she starts boric acid, would recommend Flagyl b i d  x7 days

## 2022-01-12 NOTE — TELEPHONE ENCOUNTER
Pt informed of vag culture results - has taken Diflucan x 2 doses, recom Flagyl tx for equivocal BV  Please sign off on presc for same to [facility] ([address])  Pt will recall to start boric acid tx after completing Flagy tx

## 2022-02-16 DIAGNOSIS — B37.9 CANDIDIASIS: ICD-10-CM

## 2022-02-16 DIAGNOSIS — B96.89 BV (BACTERIAL VAGINOSIS): ICD-10-CM

## 2022-02-16 DIAGNOSIS — N76.0 BV (BACTERIAL VAGINOSIS): ICD-10-CM

## 2022-02-16 RX ORDER — METRONIDAZOLE 500 MG/1
TABLET ORAL
Qty: 14 TABLET | Refills: 0 | OUTPATIENT
Start: 2022-02-16

## 2022-02-16 RX ORDER — CLOTRIMAZOLE AND BETAMETHASONE DIPROPIONATE 10; .64 MG/G; MG/G
CREAM TOPICAL
Qty: 30 G | Refills: 0 | OUTPATIENT
Start: 2022-02-16

## 2022-02-16 RX ORDER — FLUCONAZOLE 150 MG/1
TABLET ORAL
Qty: 1 TABLET | Refills: 2 | Status: SHIPPED | OUTPATIENT
Start: 2022-02-16 | End: 2022-04-30

## 2022-02-16 NOTE — TELEPHONE ENCOUNTER
Pt only need presc for Diflucan to take q month x 3 months (rfs ended on prev presc)  Please sign off on presc for Diflucan to [facility] ([address])

## 2022-05-12 ENCOUNTER — TELEPHONE (OUTPATIENT)
Dept: FAMILY MEDICINE CLINIC | Facility: CLINIC | Age: 32
End: 2022-05-12

## 2022-05-23 ENCOUNTER — OFFICE VISIT (OUTPATIENT)
Dept: FAMILY MEDICINE CLINIC | Facility: CLINIC | Age: 32
End: 2022-05-23

## 2022-05-23 VITALS
HEIGHT: 63 IN | DIASTOLIC BLOOD PRESSURE: 66 MMHG | HEART RATE: 86 BPM | SYSTOLIC BLOOD PRESSURE: 99 MMHG | TEMPERATURE: 98.1 F | WEIGHT: 164.6 LBS | BODY MASS INDEX: 29.16 KG/M2

## 2022-05-23 DIAGNOSIS — R10.11 RUQ ABDOMINAL PAIN: Primary | ICD-10-CM

## 2022-05-23 DIAGNOSIS — Z23 ENCOUNTER FOR IMMUNIZATION: ICD-10-CM

## 2022-05-23 DIAGNOSIS — Z11.59 ENCOUNTER FOR HEPATITIS C SCREENING TEST FOR LOW RISK PATIENT: ICD-10-CM

## 2022-05-23 DIAGNOSIS — Z11.4 SCREENING FOR HIV (HUMAN IMMUNODEFICIENCY VIRUS): ICD-10-CM

## 2022-05-23 DIAGNOSIS — Z23 NEED FOR TDAP VACCINATION: ICD-10-CM

## 2022-05-23 PROCEDURE — 90472 IMMUNIZATION ADMIN EACH ADD: CPT | Performed by: FAMILY MEDICINE

## 2022-05-23 PROCEDURE — 99213 OFFICE O/P EST LOW 20 MIN: CPT | Performed by: FAMILY MEDICINE

## 2022-05-23 PROCEDURE — 90715 TDAP VACCINE 7 YRS/> IM: CPT | Performed by: FAMILY MEDICINE

## 2022-05-23 PROCEDURE — 90471 IMMUNIZATION ADMIN: CPT | Performed by: FAMILY MEDICINE

## 2022-05-23 PROCEDURE — 90677 PCV20 VACCINE IM: CPT | Performed by: FAMILY MEDICINE

## 2022-05-23 RX ORDER — TOPIRAMATE 25 MG/1
TABLET ORAL
COMMUNITY
Start: 2022-05-23

## 2022-05-23 NOTE — PROGRESS NOTES
Assessment/Plan:    RUQ abdominal pain  RUQ abd pain for a few months  Prev hx of cholecystectomy 1 year ago  Unclear etiology, differential includes residual stone, hepatic etiology, lower likelihood pancreatic   - continue supportive care, tylenol PRN  - RUQ U/S for further evaluation  - labwork including CMP, CBC, lipase   - return to care/ED precautions reviewed  - follow up 1 month for results and annual physical        Diagnoses and all orders for this visit:    RUQ abdominal pain  -     US right upper quadrant; Future  -     Comprehensive metabolic panel; Future  -     CBC and differential; Future  -     Lipase; Future    Need for Tdap vaccination  -     TDAP VACCINE GREATER THAN OR EQUAL TO 6YO IM    Encounter for immunization  -     Pneumococcal Conjugate Vaccine 20-valent (Pcv20)    Screening for HIV (human immunodeficiency virus)  -     HIV 1/2 ANTIGEN/ANTIBODY (4TH GENERATION) W REFLEX [facility]; Future    Encounter for hepatitis C screening test for low risk patient  -     Hepatitis C antibody; Future    Other orders  -     topiramate (TOPAMAX) 25 mg tablet          Subjective:      Patient ID: Ingrid Givens is a 27 y o  female  Patient is a 27year old female who presents with RUQ abdominal pain  The pain started 3 months ago and started out dull  The pain became a sharp, crampy pain around 1 month ago  The pain starts in the morning and lasts the whole day  This happens around 2x a week  Patient reports no fever, no nausea, vomiting  Patient has taken some tylenol for the pain and it helps sometimes  Otherwise she tries to distract herself from the pain by staying active  Patient had her gallbladder removed 1 year ago and she said she had some stones at the time  Feels like this pain may be similar to that time however it is not as severe  Pt reports drinking alcohol only socially  Flank Pain  This is a new problem  The current episode started more than 1 month ago   The problem occurs every several days  The problem has been gradually worsening since onset  The quality of the pain is described as cramping  The pain does not radiate  The pain is at a severity of 6/10  The pain is moderate  The pain is the same all the time  Pertinent negatives include no bladder incontinence, dysuria, fever or headaches  Risk factors include obesity  She has tried analgesics and walking for the symptoms  The treatment provided mild relief  The following portions of the patient's history were reviewed and updated as appropriate: allergies, current medications, past family history, past medical history, past social history, past surgical history and problem list     Review of Systems   Constitutional: Negative for fever  HENT: Negative for facial swelling and mouth sores  Eyes: Negative for discharge and redness  Respiratory: Negative for cough, shortness of breath and wheezing  Gastrointestinal: Negative for diarrhea, nausea and vomiting  Endocrine: Negative for polyuria  Genitourinary: Positive for flank pain  Negative for bladder incontinence and dysuria  Musculoskeletal: Negative for gait problem  Skin: Negative for pallor, rash and wound  Neurological: Negative for dizziness, light-headedness and headaches  Psychiatric/Behavioral: Negative for confusion and dysphoric mood  Objective:      BP 99/66 (BP Location: Left arm, Patient Position: Sitting, Cuff Size: Large)   Pulse 86   Temp 98 1 °F (36 7 °C) (Temporal)   Ht 5' 2 5" (1 588 m)   Wt 74 7 kg (164 lb 9 6 oz)   BMI 29 63 kg/m²          Physical Exam  Constitutional:       Appearance: She is not ill-appearing  HENT:      Nose: No congestion or rhinorrhea  Eyes:      General: No scleral icterus  Cardiovascular:      Rate and Rhythm: Normal rate and regular rhythm  Heart sounds: No murmur heard  Pulmonary:      Effort: Pulmonary effort is normal  No respiratory distress  Breath sounds: Normal breath sounds   No wheezing or rales  Abdominal:      General: There is no distension  Tenderness: There is abdominal tenderness  There is no guarding or rebound  Comments: Mild abdominal TTP RUQ  Negative levy sign    Musculoskeletal:         General: Normal range of motion  Right lower leg: No edema  Left lower leg: No edema  Skin:     General: Skin is warm and dry  Findings: No rash  Neurological:      General: No focal deficit present  Mental Status: Mental status is at baseline     Psychiatric:         Mood and Affect: Mood normal          Behavior: Behavior normal            Kingsley Robbins MD, PGY2  [facility]  Date: 5/23/2022 Time: 4:17 PM

## 2022-05-23 NOTE — PATIENT INSTRUCTIONS
Abdominal Pain   AMBULATORY CARE:   Abdominal pain  can be dull, achy, or sharp  You may have pain in one area of your abdomen, or in your entire abdomen  Your pain may be caused by a condition such as constipation, food sensitivity or poisoning, infection, or a blockage  Abdominal pain can also be from a hernia, appendicitis, or an ulcer  Liver, gallbladder, or kidney conditions can also cause abdominal pain  The cause of your abdominal pain may not be known  Call your local emergency number (911 in the [address] [facility]) if:   You have new chest pain or shortness of breath  Seek care immediately if:   You have pulsing pain in your upper abdomen or lower back that suddenly becomes constant  Your pain is in the right lower abdominal area and worsens with movement  You have a fever over 100 4°F (38°C) or shaking chills  You are vomiting and cannot keep food or liquids down  Your pain does not improve or gets worse over the next 8 to 12 hours  You see blood in your vomit or bowel movements, or they look black and tarry  Your skin or the whites of your eyes turn yellow  You are a woman and have a large amount of vaginal bleeding that is not your monthly period  Call your doctor if:   You have pain in your lower back  You are a man and have pain in your testicles  You have pain when you urinate  You have questions or concerns about your condition or care  Treatment for abdominal pain  may include any of the following:  Prescription pain medicine  may be given  Ask your healthcare provider how to take this medicine safely  Some prescription pain medicines contain acetaminophen  Do not take other medicines that contain acetaminophen without talking to your healthcare provider  Too much acetaminophen may cause liver damage  Prescription pain medicine may cause constipation  Ask your healthcare provider how to prevent or treat constipation       Medicines  may be given to calm your stomach or prevent vomiting  Relaxation therapy  may be used along with pain medicine  Surgery  may be needed, depending on the cause  Manage your symptoms:   Apply heat  on your abdomen for 20 to 30 minutes every 2 hours for as many days as directed  Heat helps decrease pain and muscle spasms  Make changes to the food you eat, if needed  Do not eat foods that cause abdominal pain or other symptoms  Eat small meals more often  The following changes may also help:    Eat more high-fiber foods if you are constipated  High-fiber foods include fruits, vegetables, whole-grain foods, and legumes  Do not eat foods that cause gas if you have bloating  Examples include broccoli, cabbage, and cauliflower  Do not drink soda or carbonated drinks  These may also cause gas  Do not eat foods or drinks that contain sorbitol or fructose if you have diarrhea and bloating  Some examples are fruit juices, candy, jelly, and sugar-free gum  Do not eat high-fat foods  Examples include fried foods, cheeseburgers, hot dogs, and desserts  Limit or do not have caffeine  Caffeine may make symptoms such as heartburn or nausea worse  Drink more liquids to prevent dehydration from diarrhea or vomiting  Ask your healthcare provider how much liquid to drink each day and which liquids are best for you  Keep a diary of your abdominal pain  A diary may help your healthcare provider learn what is causing your abdominal pain  Include when the pain happens, how long it lasts, and what the pain feels like  Write down any other symptoms you have with abdominal pain  Also write down what you eat, and what symptoms you have after you eat  Manage your stress  Stress may cause abdominal pain  Your healthcare provider may recommend relaxation techniques and deep breathing exercises to help decrease your stress   Your healthcare provider may recommend you talk to someone about your stress or anxiety, such as a counselor or a trusted friend  Get plenty of sleep and exercise regularly  Limit or do not drink alcohol  Alcohol can make your abdominal pain worse  Ask your healthcare provider if it is safe for you to drink alcohol  Also ask how much is safe for you to drink  Do not smoke  Nicotine and other chemicals in cigarettes can damage your esophagus and stomach  Ask your healthcare provider for information if you currently smoke and need help to quit  E-cigarettes or smokeless tobacco still contain nicotine  Talk to your healthcare provider before you use these products  Follow up with your doctor within 24 hours or as directed:  Write down your questions so you remember to ask them during your visits  © [facility] 2022 Information is for End User's use only and may not be sold, redistributed or otherwise used for commercial purposes  All illustrations and images included in CareNotes® are the copyrighted property of [employer]  or [facility] [employer]   The above information is an  only  It is not intended as medical advice for individual conditions or treatments  Talk to your doctor, nurse or pharmacist before following any medical regimen to see if it is safe and effective for you

## 2022-05-23 NOTE — ASSESSMENT & PLAN NOTE
RUQ abd pain for a few months  Prev hx of cholecystectomy 1 year ago   Unclear etiology, differential includes residual stone, hepatic etiology, lower likelihood pancreatic   - continue supportive care, tylenol PRN  - RUQ U/S for further evaluation  - labwork including CMP, CBC, lipase   - return to care/ED precautions reviewed  - follow up 1 month for results and annual physical

## 2022-05-28 ENCOUNTER — HOSPITAL ENCOUNTER (OUTPATIENT)
Dept: RADIOLOGY | Facility: HOSPITAL | Age: 32
Discharge: HOME | End: 2022-05-28
Payer: COMMERCIAL

## 2022-05-28 DIAGNOSIS — R10.11 RUQ ABDOMINAL PAIN: ICD-10-CM

## 2022-05-28 PROCEDURE — 76705 ECHO EXAM OF ABDOMEN: CPT

## 2022-06-20 ENCOUNTER — TELEPHONE (OUTPATIENT)
Dept: FAMILY MEDICINE CLINIC | Facility: CLINIC | Age: 32
End: 2022-06-20

## 2022-07-01 ENCOUNTER — OFFICE VISIT (OUTPATIENT)
Dept: FAMILY MEDICINE CLINIC | Facility: CLINIC | Age: 32
End: 2022-07-01

## 2022-07-01 VITALS
SYSTOLIC BLOOD PRESSURE: 89 MMHG | HEART RATE: 77 BPM | BODY MASS INDEX: 30.07 KG/M2 | TEMPERATURE: 98.4 F | HEIGHT: 62 IN | RESPIRATION RATE: 18 BRPM | DIASTOLIC BLOOD PRESSURE: 55 MMHG | WEIGHT: 163.4 LBS | OXYGEN SATURATION: 99 %

## 2022-07-01 DIAGNOSIS — K59.04 CHRONIC IDIOPATHIC CONSTIPATION: ICD-10-CM

## 2022-07-01 DIAGNOSIS — Z00.00 ANNUAL PHYSICAL EXAM: Primary | ICD-10-CM

## 2022-07-01 DIAGNOSIS — R10.11 RUQ ABDOMINAL PAIN: ICD-10-CM

## 2022-07-01 PROCEDURE — 99395 PREV VISIT EST AGE 18-39: CPT | Performed by: FAMILY MEDICINE

## 2022-07-01 RX ORDER — SENNA AND DOCUSATE SODIUM 50; 8.6 MG/1; MG/1
1 TABLET, FILM COATED ORAL DAILY
Qty: 30 TABLET | Refills: 0 | Status: SHIPPED | OUTPATIENT
Start: 2022-07-01 | End: 2022-10-24

## 2022-07-01 NOTE — ASSESSMENT & PLAN NOTE
U/S reviewed, postchole, incidental small renal cyst on R found  - patient states pain is slightly better, also has hx of constipation    - patient has not done labs yet, will obtain and followup in one month during pap

## 2022-07-01 NOTE — PROGRESS NOTES
[address] [facility]    NAME: Lexis Dennis  AGE: 27 y o  SEX: female  : 1990     DATE: 2022     Assessment and Plan:     Problem List Items Addressed This Visit        Digestive    Chronic idiopathic constipation     Pt has chronic constipation, pt states she has 3-4 bottles of water daily but most likely not enough fiber intake with fruits/veggies  She has a hx of hemorrhoids as well, still does  Has tried anusol in the past, sitz baths but to no help  - encouraged more ambulation and fiber intake  - sent in senna for a one month as a trial for stool softening effect  Relevant Medications    senna-docusate sodium (SENOKOT-S) 8 6-50 mg per tablet       Other    RUQ abdominal pain     U/S reviewed, postchole, incidental small renal cyst on R found  - patient states pain is slightly better, also has hx of constipation  - patient has not done labs yet, will obtain and followup in one month during pap           Annual physical exam - Primary     Normal exam  followup 1 month for PAP smear and labwork (fasting)  - constipation addressed w/ senna+S                 Immunizations and preventive care screenings were discussed with patient today  Appropriate education was printed on patient's after visit summary  Counseling:  Alcohol/drug use: discussed moderation in alcohol intake, the recommendations for healthy alcohol use, and avoidance of illicit drug use  Dental Health: discussed importance of regular tooth brushing, flossing, and dental visits  Injury prevention: discussed safety/seat belts, safety helmets, smoke detectors, carbon dioxide detectors, and smoking near bedding or upholstery  Sexual health: discussed sexually transmitted diseases, partner selection, use of condoms, avoidance of unintended pregnancy, and contraceptive alternatives    Exercise: the importance of regular exercise/physical activity was discussed  Recommend exercise 3-5 times per week for at least 30 minutes  BMI Counseling: Body mass index is 29 89 kg/m²  The BMI is above normal  Nutrition recommendations include decreasing portion sizes, encouraging healthy choices of fruits and vegetables, consuming healthier snacks, limiting drinks that contain sugar, reducing intake of saturated and trans fat and reducing intake of cholesterol  Exercise recommendations include exercising 3-5 times per week and obtaining a gym membership  Rationale for BMI follow-up plan is due to patient being overweight or obese  Return in about 4 weeks (around 7/29/2022) for pap + lab review  Chief Complaint:     Chief Complaint   Patient presents with    Annual Exam    Physical Exam    Flank Pain     Started 3 mo ago, comes and goes      History of Present Illness:     Adult Annual Physical   Patient here for a comprehensive physical exam  The patient reports problems - constipation       Diet and Physical Activity  Diet/Nutrition: well balanced diet, limited junk food and limited fruits/vegetables  Exercise: no formal exercise  Depression Screening  PHQ-2/9 Depression Screening         General Health  Sleep: sleeps well and gets 7-8 hours of sleep on average  Hearing: normal - bilateral   Vision: no vision problems and most recent eye exam <1 year ago  Dental: regular dental visits  /GYN Health  Last menstrual period: within last month  Contraceptive method: none  History of STDs?: no      Review of Systems:     Review of Systems   Constitutional: Negative for chills and fever  HENT: Negative for ear pain and sore throat  Eyes: Negative for pain and visual disturbance  Respiratory: Negative for cough and shortness of breath  Cardiovascular: Negative for chest pain and palpitations  Gastrointestinal: Positive for constipation and rectal pain  Negative for abdominal pain and vomiting  Blood with wiping (hemorrhoids)   Genitourinary: Negative for dysuria and hematuria  Musculoskeletal: Negative for arthralgias and back pain  Skin: Negative for color change and rash  Neurological: Negative for seizures and syncope  All other systems reviewed and are negative  Past Medical History:     Past Medical History:   Diagnosis Date    Anemia     Asthma       Past Surgical History:     Past Surgical History:   Procedure Laterality Date     SECTION      4 times    CHOLECYSTECTOMY LAPAROSCOPIC N/A 2020    Procedure: CHOLECYSTECTOMY LAPAROSCOPIC;  Surgeon: Adrian Gupta; Location: [facility];  Service: General    GASTRECTOMY SLEEVE LAPAROSCOPIC        Social History:     Social History     Socioeconomic History    Marital status: /Civil Union     Spouse name: None    Number of children: None    Years of education: None    Highest education level: None   Occupational History    None   Tobacco Use    Smoking status: Never Smoker    Smokeless tobacco: Never Used   Vaping Use    Vaping Use: Never used   Substance and Sexual Activity    Alcohol use: Yes     Comment: socially    Drug use: No    Sexual activity: Not Currently     Partners: Male   Other Topics Concern    None   Social History Narrative    None     Social Determinants of Health     Financial Resource Strain: Low Risk     Difficulty of Paying Living Expenses: Not hard at all   Food Insecurity: No Food Insecurity    Worried About Running Out of Food in the Last Year: Never true    Aaliyah of Food in the Last Year: Never true   Transportation Needs: No Transportation Needs    Lack of Transportation (Medical): No    Lack of Transportation (Non-Medical):  No   Physical Activity: Not on file   Stress: Not on file   Social Connections: Not on file   Intimate Partner Violence: Not on file   Housing Stability: Low Risk     Unable to Pay for Housing in the Last Year: No    Number of Places Lived in the Last Year: 1    Unstable Housing in the Last Year: No      Family History:     History reviewed  No pertinent family history  Current Medications:     Current Outpatient Medications   Medication Sig Dispense Refill    acetaminophen (TYLENOL) 325 mg tablet Take 2 tablets (650 mg total) by mouth every 6 (six) hours as needed for mild pain 30 tablet 0    albuterol (PROVENTIL HFA,VENTOLIN HFA) 90 mcg/act inhaler Inhale 2 puffs every 6 (six) hours as needed for shortness of breath      clotrimazole-betamethasone (LOTRISONE) 1-0 05 % cream Apply topically 2 (two) times a day 30 g 0    ferrous sulfate 325 (65 Fe) mg tablet Take 1 tablet (325 mg total) by mouth daily with breakfast 30 tablet 0    senna-docusate sodium (SENOKOT-S) 8 6-50 mg per tablet Take 1 tablet by mouth daily 30 tablet 0    topiramate (TOPAMAX) 25 mg tablet        No current facility-administered medications for this visit  Allergies:     No Known Allergies   Physical Exam:     BP (!) 89/55 (BP Location: Right arm, Patient Position: Sitting, Cuff Size: Large)   Pulse 77   Temp 98 4 °F (36 9 °C) (Temporal)   Resp 18   Ht 5' 2" (1 575 m)   Wt 74 1 kg (163 lb 6 4 oz)   SpO2 99%   BMI 29 89 kg/m²     Physical Exam  Vitals reviewed  Constitutional:       General: She is not in acute distress  Appearance: Normal appearance  She is well-developed  HENT:      Head: Normocephalic and atraumatic  Nose: Nose normal  No congestion  Mouth/Throat:      Mouth: Mucous membranes are moist       Pharynx: Oropharynx is clear  No oropharyngeal exudate  Eyes:      Conjunctiva/sclera: Conjunctivae normal    Cardiovascular:      Rate and Rhythm: Normal rate and regular rhythm  Pulses: Normal pulses  Heart sounds: No murmur heard  Pulmonary:      Effort: Pulmonary effort is normal  No respiratory distress  Breath sounds: Normal breath sounds     Abdominal:      General: Bowel sounds are normal       Palpations: Abdomen is soft       Tenderness: There is no abdominal tenderness  Musculoskeletal:         General: No tenderness  Normal range of motion  Cervical back: Neck supple  Right lower leg: No edema  Left lower leg: No edema  Skin:     General: Skin is warm and dry  Capillary Refill: Capillary refill takes less than 2 seconds  Neurological:      Mental Status: She is alert and oriented to person, place, and time  Motor: No weakness  Gait: Gait normal    Psychiatric:         Mood and Affect: Mood normal          Behavior: Behavior normal          Thought Content:  Thought content normal          Judgment: Judgment normal           Isamar Art, DO   [address]

## 2022-07-01 NOTE — PATIENT INSTRUCTIONS
Hemorrhoids   AMBULATORY CARE:   Hemorrhoids  are swollen blood vessels inside your rectum (internal hemorrhoids) or on your anus (external hemorrhoids)  Sometimes a hemorrhoid may prolapse  This means it extends out of your anus  Common symptoms include the following:   · Pain or itching around your anus or inside your rectum    · Swelling or bumps around your anus    · Bright red blood in your bowel movement, on the toilet paper, or in the toilet bowl    · Tissue bulging out of your anus (prolapsed hemorrhoids)    · Incontinence (poor control over urine or bowel movements)    Seek care immediately if:   · You have severe pain in your rectum or around your anus  · You have severe pain in your abdomen and you are vomiting  · You have bleeding from your anus that soaks through your underwear  Contact your healthcare provider if:   · You have frequent and painful bowel movements  · Your hemorrhoid looks or feels more swollen than usual      · You do not have a bowel movement for 2 days or more  · You see or feel tissue coming through your anus  · You have questions or concerns about your condition or care  Treatment for hemorrhoids  may include medicines to decrease pain, swelling, and itching  The medicine may be a pill, pad, cream, or ointment  Medicine may also be given to soften your bowel movement  Surgery and other procedures may be needed to shrink or remove your hemorrhoids  Manage your symptoms:   · Apply ice on your anus for 15 to 20 minutes every hour or as directed  Use an ice pack, or put crushed ice in a plastic bag  Cover it with a towel before you apply it to your anus  Ice helps prevent tissue damage and decreases swelling and pain  · Take a sitz bath  Fill a bathtub with 4 to 6 inches of warm water  You may also use a sitz bath pan that fits inside a toilet bowl  Sit in the sitz bath for 15 minutes  Do this 3 times a day, and after each bowel movement   The warm water can help decrease pain and swelling  · Keep your anal area clean  Gently wash the area with warm water daily  Soap may irritate the area  After a bowel movement, wipe with moist towelettes or wet toilet paper  Dry toilet paper can irritate the area  Prevent hemorrhoids:   · Do not strain to have a bowel movement  Do not sit on the toilet too long  These actions can increase pressure on the tissues in your rectum and anus  · Drink plenty of liquids  Liquids can help prevent constipation  Ask how much liquid to drink each day and which liquids are best for you  · Eat a variety of high-fiber foods  Examples include fruits, vegetables, and whole grains  Ask your healthcare provider how much fiber you need each day  You may need to take a fiber supplement  · Exercise as directed  Exercise, such as walking, may make it easier to have a bowel movement  Ask your healthcare provider to help you create an exercise plan  · Do not have anal sex  Anal sex can weaken the skin around your rectum and anus  · Avoid heavy lifting  This can cause straining and increase your risk for another hemorrhoid  Follow up with your doctor as directed:  Write down your questions so you remember to ask them during your visits  © Copyright [employer] 2022 Information is for End User's use only and may not be sold, redistributed or otherwise used for commercial purposes  All illustrations and images included in CareNotes® are the copyrighted property of [employer]  or [facility] [place] Oliva Celis   The above information is an  only  It is not intended as medical advice for individual conditions or treatments  Talk to your doctor, nurse or pharmacist before following any medical regimen to see if it is safe and effective for you  Wellness Visit for Adults   AMBULATORY CARE:   A wellness visit  is when you see your healthcare provider to get screened for health problems   Your healthcare provider will also give you advice on how to stay healthy  Write down your questions so you remember to ask them  Ask your healthcare provider how often you should have a wellness visit  What happens at a wellness visit:  Your healthcare provider will ask about your health, and your family history of health problems  This includes high blood pressure, heart disease, and cancer  He or she will ask if you have symptoms that concern you, if you smoke, and about your mood  You may also be asked about your intake of medicines, supplements, food, and alcohol  Any of the following may be done:  · Your weight  will be checked  Your height may also be checked so your body mass index (BMI) can be calculated  Your BMI shows if you are at a healthy weight  · Your blood pressure  and heart rate will be checked  Your temperature may also be checked  · Blood and urine tests  may be done  Blood tests may be done to check your cholesterol levels  Abnormal cholesterol levels increase your risk for heart disease and stroke  You may also need a blood or urine test to check for diabetes if you are at increased risk  Urine tests may be done to look for signs of an infection or kidney disease  · A physical exam  includes checking your heartbeat and lungs with a stethoscope  Your healthcare provider may also check your skin to look for sun damage  · Screening tests  may be recommended  A screening test is done to check for diseases that may not cause symptoms  The screening tests you may need depend on your age, gender, family history, and lifestyle habits  For example, colorectal screening may be recommended if you are 48years old or older  Screening tests you need if you are a woman:   · A Pap smear  is used to screen for cervical cancer  Pap smears are usually done every 3 to 5 years depending on your age   You may need them more often if you have had abnormal Pap smear test results in the past  Ask your healthcare provider how often you should have a Pap smear  · A mammogram  is an x-ray of your breasts to screen for breast cancer  Experts recommend mammograms every 2 years starting at age 48 years  You may need a mammogram at age 52 years or younger if you have an increased risk for breast cancer  Talk to your healthcare provider about when you should start having mammograms and how often you need them  Vaccines you may need:   · Get an influenza vaccine  every year  The influenza vaccine protects you from the flu  Several types of viruses cause the flu  The viruses change over time, so new vaccines are made each year  · Get a tetanus-diphtheria (Td) booster vaccine  every 10 years  This vaccine protects you against tetanus and diphtheria  Tetanus is a severe infection that may cause painful muscle spasms and lockjaw  Diphtheria is a severe bacterial infection that causes a thick covering in the back of your mouth and throat  · Get a human papillomavirus (HPV) vaccine  if you are female and aged 23 to 32 or male 23 to 24 and never received it  This vaccine protects you from HPV infection  HPV is the most common infection spread by sexual contact  HPV may also cause vaginal, penile, and anal cancers  · Get a pneumococcal vaccine  if you are aged 72 years or older  The pneumococcal vaccine is an injection given to protect you from pneumococcal disease  Pneumococcal disease is an infection caused by pneumococcal bacteria  The infection may cause pneumonia, meningitis, or an ear infection  · Get a shingles vaccine  if you are 60 or older, even if you have had shingles before  The shingles vaccine is an injection to protect you from the varicella-zoster virus  This is the same virus that causes chickenpox  Shingles is a painful rash that develops in people who had chickenpox or have been exposed to the virus  How to eat healthy:  My Plate is a model for planning healthy meals   It shows the types and amounts of foods that should go on your plate  Fruits and vegetables make up about half of your plate, and grains and protein make up the other half  A serving of dairy is included on the side of your plate  The amount of calories and serving sizes you need depends on your age, gender, weight, and height  Examples of healthy foods are listed below:  · Eat a variety of vegetables  such as dark green, red, and orange vegetables  You can also include canned vegetables low in sodium (salt) and frozen vegetables without added butter or sauces  · Eat a variety of fresh fruits , canned fruit in 100% juice, frozen fruit, and dried fruit  · Include whole grains  At least half of the grains you eat should be whole grains  Examples include whole-wheat bread, wheat pasta, brown rice, and whole-grain cereals such as oatmeal     · Eat a variety of protein foods such as seafood (fish and shellfish), lean meat, and poultry without skin (turkey and chicken)  Examples of lean meats include pork leg, shoulder, or tenderloin, and beef round, sirloin, tenderloin, and extra lean ground beef  Other protein foods include eggs and egg substitutes, beans, peas, soy products, nuts, and seeds  · Choose low-fat dairy products such as skim or 1% milk or low-fat yogurt, cheese, and cottage cheese  · Limit unhealthy fats  such as butter, hard margarine, and shortening  Exercise:  Exercise at least 30 minutes per day on most days of the week  Some examples of exercise include walking, biking, dancing, and swimming  You can also fit in more physical activity by taking the stairs instead of the elevator or parking farther away from stores  Include muscle strengthening activities 2 days each week  Regular exercise provides many health benefits  It helps you manage your weight, and decreases your risk for type 2 diabetes, heart disease, stroke, and high blood pressure  Exercise can also help improve your mood   Ask your healthcare provider about the best exercise plan for you  General health and safety guidelines:   · Do not smoke  Nicotine and other chemicals in cigarettes and cigars can cause lung damage  Ask your healthcare provider for information if you currently smoke and need help to quit  E-cigarettes or smokeless tobacco still contain nicotine  Talk to your healthcare provider before you use these products  · Limit alcohol  A drink of alcohol is 12 ounces of beer, 5 ounces of wine, or 1½ ounces of liquor  · Lose weight, if needed  Being overweight increases your risk of certain health conditions  These include heart disease, high blood pressure, type 2 diabetes, and certain types of cancer  · Protect your skin  Do not sunbathe or use tanning beds  Use sunscreen with a SPF 15 or higher  Apply sunscreen at least 15 minutes before you go outside  Reapply sunscreen every 2 hours  Wear protective clothing, hats, and sunglasses when you are outside  · Drive safely  Always wear your seatbelt  Make sure everyone in your car wears a seatbelt  A seatbelt can save your life if you are in an accident  Do not use your cell phone when you are driving  This could distract you and cause an accident  Pull over if you need to make a call or send a text message  · Practice safe sex  Use latex condoms if are sexually active and have more than one partner  Your healthcare provider may recommend screening tests for sexually transmitted infections (STIs)  · Wear helmets, lifejackets, and protective gear  Always wear a helmet when you ride a bike or motorcycle, go skiing, or play sports that could cause a head injury  Wear protective equipment when you play sports  Wear a lifejacket when you are on a boat or doing water sports  © Copyright [employer] 2022 Information is for End User's use only and may not be sold, redistributed or otherwise used for commercial purposes   All illustrations and images included in CareNotes® are the copyrighted property of [employer]  or [facility] Oliva Celis   The above information is an  only  It is not intended as medical advice for individual conditions or treatments  Talk to your doctor, nurse or pharmacist before following any medical regimen to see if it is safe and effective for you

## 2022-07-01 NOTE — ASSESSMENT & PLAN NOTE
Normal exam  followup 1 month for PAP smear and labwork (fasting)  - constipation addressed w/ senna+S

## 2022-07-01 NOTE — ASSESSMENT & PLAN NOTE
Pt has chronic constipation, pt states she has 3-4 bottles of water daily but most likely not enough fiber intake with fruits/veggies  She has a hx of hemorrhoids as well, still does  Has tried anusol in the past, sitz baths but to no help  - encouraged more ambulation and fiber intake  - sent in senna for a one month as a trial for stool softening effect

## 2022-09-06 ENCOUNTER — VBI (OUTPATIENT)
Dept: ADMINISTRATIVE | Facility: OTHER | Age: 32
End: 2022-09-06

## 2022-10-23 DIAGNOSIS — K59.04 CHRONIC IDIOPATHIC CONSTIPATION: ICD-10-CM

## 2022-10-24 RX ORDER — DOCUSATE SODIUM -SENNOSIDES 50; 8.6 MG/1; MG/1
TABLET, COATED ORAL
Qty: 30 TABLET | Refills: 0 | Status: SHIPPED | OUTPATIENT
Start: 2022-10-24 | End: 2022-10-31

## 2022-10-26 NOTE — TELEPHONE ENCOUNTER
Received fax from patient's pharmacy stating [address] 8 6-50mg requires prior authorization  Called [facility] on [address], spoke to pharmacy staff member  She states prior authorization is not needed for medication

## 2022-10-28 NOTE — TELEPHONE ENCOUNTER
Received (fax from patient pharmacy) requesting an prior authorization for the medication (Senexon-S 8 6-50mg tab)  Prior authorization has been started today (10/28/2022), medical support information attached and faxed over to [facility]) at ([phone])  Confirmation has been received and placed in the prior authorization folder at the nurse station  Will follow up in 3 business day

## 2022-10-31 DIAGNOSIS — K59.04 CHRONIC IDIOPATHIC CONSTIPATION: Primary | ICD-10-CM

## 2022-10-31 RX ORDER — AMOXICILLIN 250 MG
1 CAPSULE ORAL DAILY
Qty: 90 TABLET | Refills: 0 | Status: SHIPPED | OUTPATIENT
Start: 2022-10-31

## 2022-10-31 NOTE — TELEPHONE ENCOUNTER
You had ordered this medication and prior auth was denied  Per insurance the formulary medications are either SM stool softner or gnp senna plus  Please send one of these alternatives if appropriate

## 2022-11-15 ENCOUNTER — APPOINTMENT (EMERGENCY)
Dept: RADIOLOGY | Facility: HOSPITAL | Age: 32
End: 2022-11-15

## 2022-11-15 ENCOUNTER — HOSPITAL ENCOUNTER (EMERGENCY)
Facility: HOSPITAL | Age: 32
Discharge: HOME | End: 2022-11-15
Attending: EMERGENCY MEDICINE

## 2022-11-15 VITALS
SYSTOLIC BLOOD PRESSURE: 125 MMHG | DIASTOLIC BLOOD PRESSURE: 118 MMHG | HEART RATE: 77 BPM | TEMPERATURE: 97.9 F | OXYGEN SATURATION: 100 % | RESPIRATION RATE: 20 BRPM

## 2022-11-15 DIAGNOSIS — N20.0 KIDNEY STONE: Primary | ICD-10-CM

## 2022-11-15 DIAGNOSIS — R10.9 RIGHT FLANK PAIN: ICD-10-CM

## 2022-11-15 LAB
BACTERIA UR QL AUTO: ABNORMAL /HPF
BILIRUB UR QL STRIP: NEGATIVE
CLARITY UR: ABNORMAL
COLOR UR: ABNORMAL
EXT PREG TEST URINE: NEGATIVE
EXT. CONTROL ED NAV: NORMAL
GLUCOSE UR STRIP-MCNC: NEGATIVE MG/DL
HGB UR QL STRIP.AUTO: ABNORMAL
KETONES UR STRIP-MCNC: NEGATIVE MG/DL
LEUKOCYTE ESTERASE UR QL STRIP: ABNORMAL
MUCOUS THREADS UR QL AUTO: ABNORMAL
NITRITE UR QL STRIP: NEGATIVE
NON-SQ EPI CELLS URNS QL MICRO: ABNORMAL /HPF
PH UR STRIP.AUTO: 6 [PH]
PROT UR STRIP-MCNC: ABNORMAL MG/DL
RBC #/AREA URNS AUTO: ABNORMAL /HPF
SP GR UR STRIP.AUTO: 1.02 (ref 1–1.03)
UROBILINOGEN UR STRIP-ACNC: <2 MG/DL
WBC #/AREA URNS AUTO: ABNORMAL /HPF

## 2022-11-15 RX ORDER — KETOROLAC TROMETHAMINE 30 MG/ML
15 INJECTION, SOLUTION INTRAMUSCULAR; INTRAVENOUS ONCE
Status: COMPLETED | OUTPATIENT
Start: 2022-11-15 | End: 2022-11-15

## 2022-11-15 RX ADMIN — KETOROLAC TROMETHAMINE 15 MG: 30 INJECTION, SOLUTION INTRAMUSCULAR at 16:03

## 2022-11-15 NOTE — ED PROVIDER NOTES
History  Chief Complaint   Patient presents with   • Flank Pain     Pt has been having pain in R flank  Pt feels urge to urinate, and pressure in bladder  Patient is a 27yoF with no significant past medical history presenting to the emergency department for evaluation of right flank pain patient states that started earlier today however the past couple days she has felt the urge to urinate impression or bladder has been feeling like her in the coming out the [place]  She denies any burning when she pees she denies any fever or chills chest pain or shortness breath she denies any nausea or vomiting diarrhea constipation, vaginal discharge, vaginal rashes  She notes that she has not taken anything for alleviation of her symptoms  Patient states that her last menstrual period ended on the  of this month  Prior to Admission Medications   Prescriptions Last Dose Informant Patient Reported? Taking?   acetaminophen (TYLENOL) 325 mg tablet  Self No No   Sig: Take 2 tablets (650 mg total) by mouth every 6 (six) hours as needed for mild pain   albuterol (PROVENTIL HFA,VENTOLIN HFA) 90 mcg/act inhaler  Self Yes No   Sig: Inhale 2 puffs every 6 (six) hours as needed for shortness of breath   clotrimazole-betamethasone (LOTRISONE) 1-0 05 % cream  Self No No   Sig: Apply topically 2 (two) times a day   ferrous sulfate 325 (65 Fe) mg tablet  Self No No   Sig: Take 1 tablet (325 mg total) by mouth daily with breakfast   senna-docusate sodium (SENOKOT S) 8 6-50 mg per tablet   No No   Sig: Take 1 tablet by mouth daily   topiramate (TOPAMAX) 25 mg tablet  Self Yes No      Facility-Administered Medications: None       Past Medical History:   Diagnosis Date   • Anemia    • Asthma        Past Surgical History:   Procedure Laterality Date   •  SECTION      4 times   • CHOLECYSTECTOMY LAPAROSCOPIC N/A 2020    Procedure: CHOLECYSTECTOMY LAPAROSCOPIC;  Surgeon: Jhon Jones;   Location: [facility]; Service: General   • GASTRECTOMY SLEEVE LAPAROSCOPIC         History reviewed  No pertinent family history  I have reviewed and agree with the history as documented  E-Cigarette/Vaping   • E-Cigarette Use Never User      E-Cigarette/Vaping Substances   • Nicotine No    • THC No    • CBD No    • Flavoring No    • Other No      Social History     Tobacco Use   • Smoking status: Never Smoker   • Smokeless tobacco: Never Used   Vaping Use   • Vaping Use: Never used   Substance Use Topics   • Alcohol use: Yes     Comment: socially   • Drug use: No        Review of Systems   Constitutional: Positive for activity change  Negative for chills and fever  Eyes: Negative for pain and visual disturbance  Respiratory: Negative for cough, chest tightness and shortness of breath  Cardiovascular: Negative for chest pain and palpitations  Gastrointestinal: Negative for abdominal pain, constipation, diarrhea, nausea and vomiting  Genitourinary: Positive for difficulty urinating, flank pain, frequency and urgency  Negative for decreased urine volume, dysuria, hematuria, pelvic pain, vaginal bleeding, vaginal discharge and vaginal pain  Musculoskeletal: Negative for arthralgias, back pain, neck pain and neck stiffness  Skin: Negative for color change and rash  Neurological: Negative for dizziness, seizures, syncope, weakness, light-headedness, numbness and headaches  Psychiatric/Behavioral: Negative for agitation and behavioral problems  All other systems reviewed and are negative        Physical Exam  ED Triage Vitals   Temperature Pulse Respirations Blood Pressure SpO2   11/15/22 1444 11/15/22 1444 11/15/22 1444 11/15/22 1444 11/15/22 1444   97 9 °F (36 6 °C) 77 20 (!) 125/118 100 %      Temp Source Heart Rate Source Patient Position - Orthostatic VS BP Location FiO2 (%)   11/15/22 1444 11/15/22 1444 -- 11/15/22 1444 --   Oral Monitor  Left arm       Pain Score       11/15/22 1603       8 Orthostatic Vital Signs  Vitals:    11/15/22 1444   BP: (!) 125/118   Pulse: 77       Physical Exam  Vitals and nursing note reviewed  Constitutional:       General: She is not in acute distress  Appearance: Normal appearance  She is well-developed  HENT:      Head: Normocephalic and atraumatic  Right Ear: External ear normal       Left Ear: External ear normal       Nose: Nose normal       Mouth/Throat:      Mouth: Mucous membranes are moist    Eyes:      Extraocular Movements: Extraocular movements intact  Conjunctiva/sclera: Conjunctivae normal       Pupils: Pupils are equal, round, and reactive to light  Cardiovascular:      Rate and Rhythm: Normal rate and regular rhythm  Heart sounds: Normal heart sounds  No murmur heard  Pulmonary:      Effort: Pulmonary effort is normal  No respiratory distress  Breath sounds: Normal breath sounds  No rhonchi  Chest:      Chest wall: No tenderness  Abdominal:      General: Abdomen is flat  Palpations: Abdomen is soft  Tenderness: There is no abdominal tenderness  There is no right CVA tenderness, left CVA tenderness, guarding or rebound  Musculoskeletal:         General: Normal range of motion  Cervical back: Normal range of motion and neck supple  Right lower leg: No edema  Left lower leg: No edema  Skin:     General: Skin is warm and dry  Capillary Refill: Capillary refill takes less than 2 seconds  Neurological:      General: No focal deficit present  Mental Status: She is alert and oriented to person, place, and time     Psychiatric:         Mood and Affect: Mood normal          Behavior: Behavior normal          ED Medications  Medications   ketorolac (TORADOL) injection 15 mg (15 mg Intramuscular Given 11/15/22 1603)       Diagnostic Studies  Results Reviewed     Procedure Component Value Units Date/Time    Urine Microscopic [[id number]]  (Abnormal) Collected: 11/15/22 4168    Lab Status: Final result Specimen: Urine, Other Updated: 11/15/22 1612     RBC, UA 30-50 /hpf      WBC, UA 10-20 /hpf      Epithelial Cells Occasional /hpf      Bacteria, UA None Seen /hpf      MUCUS THREADS Innumerable    Urine culture [[id number]] Collected: 11/15/22 1555    Lab Status: In process Specimen: Urine, Other Updated: 11/15/22 1612    UA w Reflex to Microscopic w Reflex to Culture [[id number]]  (Abnormal) Collected: 11/15/22 1555    Lab Status: Final result Specimen: Urine, Other Updated: 11/15/22 1608     Color, UA Light Yellow     Clarity, UA Turbid     Specific [place], UA 1 024     pH, UA 6 0     Leukocytes, UA Small     Nitrite, UA Negative     Protein, UA Trace mg/dl      Glucose, UA Negative mg/dl      Ketones, UA Negative mg/dl      Urobilinogen, UA <2 0 mg/dl      Bilirubin, UA Negative     Occult Blood, UA Moderate    POCT pregnancy, urine [[id number]]  (Normal) Resulted: 11/15/22 1557    Lab Status: Final result Updated: 11/15/22 1557     EXT PREG TEST UR (Ref: Negative) negative     Control valid                 CT renal stone study abdomen pelvis wo contrast   Final Result by Maksim Sotomayor MD (11/15 1701)      3 mm distal right ureteral calculus eliciting mild hydroureteronephrosis         Workstation performed: [id number]               Procedures  Procedures      ED Course  ED Course as of 11/15/22 1711   Tue Nov 15, 2022   1535 Blood Pressure(!): 125/118   1535 Temperature: 97 9 °F (36 6 °C)   1535 Temp Source: Oral   1535 Pulse: 77   1535 Respirations: 20   1535 SpO2: 100 %   1544 Patient is a 39-year-old female presenting to the emergency department for evaluation of right flank pain  Patient states started today and is a dull sensation in her lower back  Patient has no CVA tenderness to palpation has no abdomen tenderness to palpation  Will evaluate patient with UA with reflex culture as well as point of care urine pregnancy    The will treat patient's symptoms with Toradol and frequently re-evaluate  Patient is aware she has no questions or concerns she is stable will frequently re-evaluate  1600 PREGNANCY TEST URINE: negative   1603 POC renal us showed slight hydro on the right  Will put in for CT renal stone study  Patient is aware she has no questions or concerns at this time  1611 Blood, UA(!): Moderate   1611 POCT URINE PROTEIN(!): Trace   1611 Leukocytes, UA(!): Small   1611 Nitrite, UA: Negative   1702 3 mm distal right ureteral calculus eliciting mild hydroureteronephrosis   1702 Pt re-examined and evaluated after testing and treatment  Patient informed of all lab and imaging findings  That she has a kidney stone  Spoke with the patient and feeling improved and sxs have resolved  Will discharge home with close f/u with pcp and instructed to return to the ED if sxs worsen or continue  Pt agrees with the plan for discharge and feels comfortable to go home with proper f/u  Advised to return for worsening or additional problems  Diagnostic tests were reviewed and questions answered  Diagnosis, care plan and treatment options were discussed  The patient understand instructions and will follow up as directed  Advised to follow up with their pcp in a few days for re-evaluation  Also advised to call and schedule an appointment with urology for further evaluation and workup  Advised to continue symptomatic care with over the counter mediations  Patient is stable for discharge                                           MDM    Disposition  Final diagnoses:   Kidney stone   Right flank pain     Time reflects when diagnosis was documented in both MDM as applicable and the Disposition within this note     Time User Action Codes Description Comment    11/15/2022  5:05 PM Klaudia Teran [N20 0] Kidney stone     11/15/2022  5:05 PM Klaudia Teran [R10 9] Right flank pain       ED Disposition     ED Disposition   Discharge    Condition   Stable    Date/Time   Tue Nov 15, 2022  5:05 PM Tania[address] 688 discharge to home/self care  Follow-up Information     Follow up With Specialties Details Why Contact Info Additional [address] Emergency Department Emergency Medicine Go to  As needed, If symptoms worsen [address] [phone] [id number] [facility] 64 [facility] Emergency Department, [address], [facility] For Urology [facility] Urology Schedule an appointment as soon as possible for a visit  for follow up [address] 1400 [facility] [phone]  [facility] [place][note 2]6 [facility] For Urology [facility], [address], [place] [address],  [facility]          Patient's Medications   Discharge Prescriptions    No medications on file         PDMP Review     None           ED Provider  Attending physically available and evaluated Leixs Dennis I managed the patient along with the ED Attending      Electronically Signed by         Emilia Shukla DO  11/15/22 9594

## 2022-11-15 NOTE — DISCHARGE INSTRUCTIONS
Thank you for coming to the ER today  Please follow up with your primary care doctor in 1-2 days to be re-evaluated  If at any point you experience any new or worsening symptoms do not hesitate to come back to the hospital to be evaluated  Please take over the counter medications and try to stay hydrated for symptomatic relief  Thank you and hope you have a great rest of your day

## 2022-11-15 NOTE — Clinical Note
Mauro Masters was seen and treated in our emergency department on 11/15/2022  Diagnosis:     Angela Mejía    She may return on this date: 11/17/2022         If you have any questions or concerns, please don't hesitate to call        Emilia Shukla DO    ______________________________           _______________          _______________  Hospital Representative                              Date                                Time

## 2022-11-15 NOTE — ED PROCEDURE NOTE
Procedure  POC Renal US    Date/Time: 11/15/2022 4:15 PM  Performed by: Sonja Li MD  Authorized by: Sonja Li MD     Patient location:  ED  Performed by:  Resident  Procedure details:     Exam Type:  Diagnostic and educational    Indications: flank/back pain      Assessment for:  Suspected hydronephrosis and bladder volume    Views obtained: bladder (transverse and sagittal), left kidney and right kidney      Image quality: diagnostic      Image availability:  Video obtained  Findings:     LEFT kidney findings: unremarkable      LEFT hydronephrosis: none      RIGHT kidney findings: unremarkable      RIGHT hydronephrosis: mild (grade 1)      Bladder:  Visualized  Interpretation:     Renal ultrasound impressions: hydronephrosis                       Sonja Li MD  11/15/22 7764

## 2022-11-16 LAB
BACTERIA UR CULT: ABNORMAL
BACTERIA UR CULT: ABNORMAL

## 2022-11-17 ENCOUNTER — OFFICE VISIT (OUTPATIENT)
Dept: FAMILY MEDICINE CLINIC | Facility: CLINIC | Age: 32
End: 2022-11-17

## 2022-11-17 VITALS
HEIGHT: 62 IN | WEIGHT: 159 LBS | DIASTOLIC BLOOD PRESSURE: 63 MMHG | SYSTOLIC BLOOD PRESSURE: 97 MMHG | RESPIRATION RATE: 19 BRPM | BODY MASS INDEX: 29.26 KG/M2 | TEMPERATURE: 97.6 F | HEART RATE: 76 BPM

## 2022-11-17 DIAGNOSIS — N20.1 URETEROLITHIASIS: Primary | ICD-10-CM

## 2022-11-17 RX ORDER — TAMSULOSIN HYDROCHLORIDE 0.4 MG/1
0.4 CAPSULE ORAL
Qty: 14 CAPSULE | Refills: 0 | Status: SHIPPED | OUTPATIENT
Start: 2022-11-17

## 2022-11-17 RX ORDER — NAPROXEN 500 MG/1
500 TABLET ORAL 2 TIMES DAILY PRN
Qty: 60 TABLET | Refills: 1 | Status: SHIPPED | OUTPATIENT
Start: 2022-11-17

## 2022-11-17 NOTE — PROGRESS NOTES
Name: Sonny Coburn      : 1990      MRN: [id number]  Encounter Provider: Mariela Jackson MD  Encounter Date: 2022   Encounter department: [address] [place] [address]  Ureterolithiasis  Assessment & Plan:  Recent ED visit 11/15 with R flank pain and pressure when urinating  Found to have kidney stones  CT renal stone study 11/15  "RIGHT KIDNEY AND URETER:  3 mm distal ureteral calculus at the vesicoureteral junction (series 601, image 80)  Mild hydroureteronephrosis  Multiple nonobstructing renal calculi measuring up to 3 mm    LEFT KIDNEY AND URETER: There are nonobstructing intrarenal calculi measuring on the order of 5 mm  No hydronephrosis or hydroureter "   Urine cx negative for infection   Pt continues with R flank/back pain managed on tylenol and warm baths  - continue to encourage increased water intake  - trial flomax x2 weeks   - pt may attempt to observe or strain urine for passage of stone however discussed this can be easily missed especially if small  - pt declined toradol injection in office today, will send naproxen BID PRN for pain control  Can continue using tylenol and supportive care  - has urology appt    - f/u 1 month or earlier as needed     Orders:  -     tamsulosin (FLOMAX) 0 4 mg; Take 1 capsule (0 4 mg total) by mouth daily with dinner  -     naproxen (Naprosyn) 500 mg tablet; Take 1 tablet (500 mg total) by mouth 2 (two) times a day as needed for mild pain         Subjective      HPI   33 yo female presenting for kidney stone f/u ED  Presented to ED on 11/15 with R flank pain and urinary urge, bladder pressure  CT renal stone study with stones  Pt felt better with toradol and was discharged to follow up PCP And urology  Continues with R side and half of back pain  Pt is taking tylenol and is tired of using that but it does help take the edge off  Pain currently 7/10, reports tylenol helps a little bit   When it gets bad, pt goes sit in a hot back for 15-20 min which helps but starts again as soon as she comes out  Also reports pressure when urinating but no burning, odor, or cloudiness to urine  Pt has urology appt 12/19  Reports drinking a lot of water, does not drink soda or coffee  Review of Systems   Constitutional: Negative for chills and fever  HENT: Negative for ear pain and sore throat  Eyes: Negative for pain and visual disturbance  Respiratory: Negative for cough and shortness of breath  Cardiovascular: Negative for chest pain and palpitations  Gastrointestinal: Negative for abdominal pain and vomiting  Genitourinary: Positive for flank pain  Negative for dysuria and hematuria  Musculoskeletal: Positive for back pain  Negative for arthralgias  Skin: Negative for color change and rash  Neurological: Negative for seizures and syncope  Psychiatric/Behavioral: Negative for agitation and confusion  Current Outpatient Medications on File Prior to Visit   Medication Sig   • acetaminophen (TYLENOL) 325 mg tablet Take 2 tablets (650 mg total) by mouth every 6 (six) hours as needed for mild pain   • albuterol (PROVENTIL HFA,VENTOLIN HFA) 90 mcg/act inhaler Inhale 2 puffs every 6 (six) hours as needed for shortness of breath   • clotrimazole-betamethasone (LOTRISONE) 1-0 05 % cream Apply topically 2 (two) times a day   • ferrous sulfate 325 (65 Fe) mg tablet Take 1 tablet (325 mg total) by mouth daily with breakfast   • senna-docusate sodium (SENOKOT S) 8 6-50 mg per tablet Take 1 tablet by mouth daily   • topiramate (TOPAMAX) 25 mg tablet        Objective     BP 97/63 (BP Location: Right arm, Patient Position: Sitting, Cuff Size: Standard)   Pulse 76   Temp 97 6 °F (36 4 °C) (Temporal)   Resp 19   Ht 5' 2" (1 575 m)   Wt 72 1 kg (159 lb)   LMP 11/06/2022   BMI 29 08 kg/m²     Physical Exam  Vitals reviewed  Constitutional:       General: She is not in acute distress       Appearance: Normal appearance  She is well-developed and well-nourished  HENT:      Head: Normocephalic and atraumatic  Mouth/Throat:      Mouth: Oropharynx is clear and moist    Eyes:      Extraocular Movements: Extraocular movements intact and EOM normal       Conjunctiva/sclera: Conjunctivae normal    Cardiovascular:      Rate and Rhythm: Normal rate and regular rhythm  Pulses: Intact distal pulses  Heart sounds: Normal heart sounds  No murmur heard  Pulmonary:      Effort: Pulmonary effort is normal  No respiratory distress  Breath sounds: Normal breath sounds  No wheezing or rales  Abdominal:      General: Bowel sounds are normal  There is no distension  Palpations: Abdomen is soft  Tenderness: There is no abdominal tenderness  There is no right CVA tenderness or left CVA tenderness  Musculoskeletal:         General: Tenderness present  No deformity or edema  Normal range of motion  Cervical back: Normal range of motion and neck supple  Comments: TTP L flank   Skin:     General: Skin is warm and dry  Findings: No rash  Neurological:      General: No focal deficit present  Mental Status: She is alert  Mental status is at baseline     Psychiatric:         Mood and Affect: Mood and affect and mood normal          Behavior: Behavior normal           Carol Duncan MD

## 2022-11-17 NOTE — PATIENT INSTRUCTIONS
Kidney Stones   WHAT YOU NEED TO KNOW:   Kidney stones form in the urinary system when the water and waste in your urine are out of balance  When this happens, certain types of waste crystals separate from the urine  The crystals build up and form kidney stones  You may have more than one kidney stone  DISCHARGE INSTRUCTIONS:   Return to the emergency department if:   You are vomiting and it is not relieved with medicine  Call your doctor or kidney specialist if:   You have a fever  You have trouble urinating  You see blood in your urine  You have severe pain  You have any questions or concerns about your condition or care  Medicines: You may need any of the following:  NSAIDs , such as ibuprofen, help decrease swelling, pain, and fever  This medicine is available with or without a doctor's order  NSAIDs can cause stomach bleeding or kidney problems in certain people  If you take blood thinner medicine, always ask your healthcare provider if NSAIDs are safe for you  Always read the medicine label and follow directions  Acetaminophen  decreases pain and fever  It is available without a doctor's order  Ask how much to take and how often to take it  Follow directions  Read the labels of all other medicines you are using to see if they also contain acetaminophen, or ask your doctor or pharmacist  Acetaminophen can cause liver damage if not taken correctly  Do not use more than 4 grams (4,000 milligrams) total of acetaminophen in one day  Prescription pain medicine  may be given  Ask your healthcare provider how to take this medicine safely  Some prescription pain medicines contain acetaminophen  Do not take other medicines that contain acetaminophen without talking to your healthcare provider  Too much acetaminophen may cause liver damage  Prescription pain medicine may cause constipation  Ask your healthcare provider how to prevent or treat constipation       Medicines  to balance your electrolytes may be needed  Take your medicine as directed  Contact your healthcare provider if you think your medicine is not helping or if you have side effects  Tell him or her if you are allergic to any medicine  Keep a list of the medicines, vitamins, and herbs you take  Include the amounts, and when and why you take them  Bring the list or the pill bottles to follow-up visits  Carry your medicine list with you in case of an emergency  What you can do to manage kidney stones:   Drink more liquids  Your healthcare provider may tell you to drink at least 8 to 12 (eight-ounce) cups of liquids each day  This helps flush out the kidney stones when you urinate  Water is the best liquid to drink  Strain your urine every time you go to the bathroom  Urinate through a strainer or a piece of thin cloth to catch the stones  Take the stones to your healthcare provider so they can be sent to the lab for tests  This will help your healthcare providers plan the best treatment for you  Ask if you should avoid any foods  You may need to limit oxalate  Oxalate is a chemical found in some plant foods  The most common type of kidney stone is made up of crystals that contain calcium and oxalate  Your healthcare provider or dietitian may recommend that you limit oxalate if you get this type of kidney stone often  You may need to limit how much sodium (salt) or protein you eat  Ask for information about the best foods for you  Be physically active as directed  Your stones may pass more easily if you stay active  Physical activity can also help you manage your weight  Ask about the best activities for you  After you pass the kidney stones: Your healthcare provider may  order a 24-hour urine test  Results from a 24-hour urine test will help your healthcare provider plan ways to prevent more stones from forming  Your healthcare provider will give you more instructions    Follow up with your doctor or kidney specialist as directed:  Write down your questions so you remember to ask them during your visits  © Copyright [address] 2022 Information is for End User's use only and may not be sold, redistributed or otherwise used for commercial purposes  All illustrations and images included in CareNotes® are the copyrighted property of A D A M , Inc  or [facility] Oliva Celis   The above information is an  only  It is not intended as medical advice for individual conditions or treatments  Talk to your doctor, nurse or pharmacist before following any medical regimen to see if it is safe and effective for you

## 2022-11-17 NOTE — ASSESSMENT & PLAN NOTE
Recent ED visit 11/15 with R flank pain and pressure when urinating  Found to have kidney stones  CT renal stone study 11/15  "RIGHT KIDNEY AND URETER:  3 mm distal ureteral calculus at the vesicoureteral junction (series 601, image 80)  Mild hydroureteronephrosis  Multiple nonobstructing renal calculi measuring up to 3 mm    LEFT KIDNEY AND URETER: There are nonobstructing intrarenal calculi measuring on the order of 5 mm  No hydronephrosis or hydroureter "   Urine cx negative for infection   Pt continues with R flank/back pain managed on tylenol and warm baths  - continue to encourage increased water intake  - trial flomax x2 weeks   - pt may attempt to observe or strain urine for passage of stone however discussed this can be easily missed especially if small  - pt declined toradol injection in office today, will send naproxen BID PRN for pain control   Can continue using tylenol and supportive care  - has urology appt 12/19   - f/u 1 month or earlier as needed

## 2022-11-18 NOTE — ED ATTENDING ATTESTATION
11/15/2022  IBaron DO, saw and evaluated the patient  I have discussed the patient with the resident/non-physician practitioner and agree with the resident's/non-physician practitioner's findings, Plan of Care, and MDM as documented in the resident's/non-physician practitioner's note, except where noted  All available labs and Radiology studies were reviewed  I was present for key portions of any procedure(s) performed by the resident/non-physician practitioner and I was immediately available to provide assistance  At this point I agree with the current assessment done in the Emergency Department  I have conducted an independent evaluation of this patient a history and physical is as follows:    78-year-old female presents with right flank pain that started earlier today  Patient states over the past few days has been having increased urge to urinate and feeling like her bladder is not emptying completely  denies dysuria, no fevers or chills, no chest pain or shortness of breath  Denies nausea or vomiting  Denies vaginal discharge  On exam-no acute distress, heart regular, no respiratory distress, abdomen soft and nontender, no CVA tenderness    Plan-check labs and urine, CT stone study, pain control and reassess    ED Course         Critical Care Time  Procedures

## 2022-11-22 ENCOUNTER — APPOINTMENT (EMERGENCY)
Dept: RADIOLOGY | Facility: HOSPITAL | Age: 32
End: 2022-11-22

## 2022-11-22 ENCOUNTER — HOSPITAL ENCOUNTER (EMERGENCY)
Facility: HOSPITAL | Age: 32
Discharge: HOME | End: 2022-11-22
Attending: EMERGENCY MEDICINE

## 2022-11-22 VITALS
DIASTOLIC BLOOD PRESSURE: 58 MMHG | SYSTOLIC BLOOD PRESSURE: 106 MMHG | RESPIRATION RATE: 16 BRPM | TEMPERATURE: 97.8 F | HEART RATE: 70 BPM | OXYGEN SATURATION: 99 %

## 2022-11-22 DIAGNOSIS — D64.9 ANEMIA: Primary | ICD-10-CM

## 2022-11-22 DIAGNOSIS — N39.0 UTI (URINARY TRACT INFECTION): ICD-10-CM

## 2022-11-22 LAB
ALBUMIN SERPL BCP-MCNC: 3.7 G/DL (ref 3.5–5)
ALP SERPL-CCNC: 54 U/L (ref 46–116)
ALT SERPL W P-5'-P-CCNC: 14 U/L (ref 12–78)
ANION GAP SERPL CALCULATED.3IONS-SCNC: 4 MMOL/L (ref 4–13)
AST SERPL W P-5'-P-CCNC: 10 U/L (ref 5–45)
BACTERIA UR QL AUTO: ABNORMAL /HPF
BASOPHILS # BLD AUTO: 0.01 THOUSANDS/ÂΜL (ref 0–0.1)
BASOPHILS NFR BLD AUTO: 0 % (ref 0–1)
BILIRUB SERPL-MCNC: 0.28 MG/DL (ref 0.2–1)
BILIRUB UR QL STRIP: NEGATIVE
BUN SERPL-MCNC: 13 MG/DL (ref 5–25)
CALCIUM SERPL-MCNC: 9 MG/DL (ref 8.3–10.1)
CHLORIDE SERPL-SCNC: 112 MMOL/L (ref 96–108)
CLARITY UR: CLEAR
CO2 SERPL-SCNC: 23 MMOL/L (ref 21–32)
COLOR UR: ABNORMAL
CREAT SERPL-MCNC: 0.46 MG/DL (ref 0.6–1.3)
EOSINOPHIL # BLD AUTO: 0.05 THOUSAND/ÂΜL (ref 0–0.61)
EOSINOPHIL NFR BLD AUTO: 1 % (ref 0–6)
ERYTHROCYTE [DISTWIDTH] IN BLOOD BY AUTOMATED COUNT: 17 % (ref 11.6–15.1)
EXT PREGNANCY TEST URINE: NEGATIVE
EXT. CONTROL: NORMAL
GFR SERPL CREATININE-BSD FRML MDRD: 131 ML/MIN/1.73SQ M
GLUCOSE SERPL-MCNC: 99 MG/DL (ref 65–140)
GLUCOSE UR STRIP-MCNC: NEGATIVE MG/DL
HCT VFR BLD AUTO: 27.2 % (ref 34.8–46.1)
HGB BLD-MCNC: 7.1 G/DL (ref 11.5–15.4)
HGB UR QL STRIP.AUTO: ABNORMAL
IMM GRANULOCYTES # BLD AUTO: 0.01 THOUSAND/UL (ref 0–0.2)
IMM GRANULOCYTES NFR BLD AUTO: 0 % (ref 0–2)
KETONES UR STRIP-MCNC: NEGATIVE MG/DL
LEUKOCYTE ESTERASE UR QL STRIP: ABNORMAL
LIPASE SERPL-CCNC: 228 U/L (ref 73–393)
LYMPHOCYTES # BLD AUTO: 0.8 THOUSANDS/ÂΜL (ref 0.6–4.47)
LYMPHOCYTES NFR BLD AUTO: 20 % (ref 14–44)
MCH RBC QN AUTO: 19.4 PG (ref 26.8–34.3)
MCHC RBC AUTO-ENTMCNC: 26.1 G/DL (ref 31.4–37.4)
MCV RBC AUTO: 74 FL (ref 82–98)
MONOCYTES # BLD AUTO: 0.42 THOUSAND/ÂΜL (ref 0.17–1.22)
MONOCYTES NFR BLD AUTO: 10 % (ref 4–12)
MUCOUS THREADS UR QL AUTO: ABNORMAL
NEUTROPHILS # BLD AUTO: 2.79 THOUSANDS/ÂΜL (ref 1.85–7.62)
NEUTS SEG NFR BLD AUTO: 69 % (ref 43–75)
NITRITE UR QL STRIP: NEGATIVE
NON-SQ EPI CELLS URNS QL MICRO: ABNORMAL /HPF
NRBC BLD AUTO-RTO: 0 /100 WBCS
PH UR STRIP.AUTO: 5.5 [PH]
PLATELET # BLD AUTO: 280 THOUSANDS/UL (ref 149–390)
PMV BLD AUTO: 10.7 FL (ref 8.9–12.7)
POTASSIUM SERPL-SCNC: 3.8 MMOL/L (ref 3.5–5.3)
PROT SERPL-MCNC: 7.1 G/DL (ref 6.4–8.4)
PROT UR STRIP-MCNC: ABNORMAL MG/DL
RBC # BLD AUTO: 3.66 MILLION/UL (ref 3.81–5.12)
RBC #/AREA URNS AUTO: ABNORMAL /HPF
SODIUM SERPL-SCNC: 139 MMOL/L (ref 135–147)
SP GR UR STRIP.AUTO: 1.02 (ref 1–1.03)
UROBILINOGEN UR STRIP-ACNC: <2 MG/DL
WBC # BLD AUTO: 4.08 THOUSAND/UL (ref 4.31–10.16)
WBC #/AREA URNS AUTO: ABNORMAL /HPF

## 2022-11-22 RX ORDER — CEPHALEXIN 500 MG/1
500 CAPSULE ORAL EVERY 8 HOURS SCHEDULED
Qty: 21 CAPSULE | Refills: 0 | Status: SHIPPED | OUTPATIENT
Start: 2022-11-22 | End: 2022-11-29

## 2022-11-22 RX ORDER — CEPHALEXIN 500 MG/1
500 CAPSULE ORAL ONCE
Status: COMPLETED | OUTPATIENT
Start: 2022-11-22 | End: 2022-11-22

## 2022-11-22 RX ADMIN — CEPHALEXIN 500 MG: 500 CAPSULE ORAL at 18:53

## 2022-11-22 RX ADMIN — IOHEXOL 100 ML: 350 INJECTION, SOLUTION INTRAVENOUS at 17:57

## 2022-11-22 RX ADMIN — MORPHINE SULFATE 2 MG: 2 INJECTION, SOLUTION INTRAMUSCULAR; INTRAVENOUS at 12:12

## 2022-11-22 RX ADMIN — SODIUM CHLORIDE 1000 ML: 0.9 INJECTION, SOLUTION INTRAVENOUS at 12:13

## 2022-11-22 NOTE — DISCHARGE INSTRUCTIONS
Return to the ER with any new or worsening of symptoms such as but not limited to increased pain, fevers, difficulty urinating, bloody or black stools, dizziness, chest pain, shortness of breath, dyspnea on exertion, vomiting, or any other concerning symptoms    As discussed you should take your iron supplements you were instructed to previously  Follow up with your doctor at your appointment Friday for your anemia  Return to the ER sooner with any new or worsening of symptoms  Thank you for allowing us to be part of your care today

## 2022-11-22 NOTE — ED PROVIDER NOTES
History  Chief Complaint   Patient presents with   • Flank Pain     Pt reports L flank pain starting today; pt has hx of kidney stones and states pain feels the same      Patient presents to the ER for evaluation of left flank pain  The patient states that the pain began around 10am this morning  States that it is in her left flank/back  Patient states that the pain has been a constant presence since the onset  Patient states that she was seen 1 week ago for right flank pain and was diagnosed with a right kidney stone  Patient states that the right flank pain has subsided for the most part  Patient states that she took tylenol earlier this morning however denies any other medication use  Patient states she has a history of a cholecystectomy as well as a gastric sleeve 3 years ago  Patient states that she has a history of anemia however has not been taking any iron supplements for a long time  Patient denies any trauma to the area  Patient denies any fevers, headaches, dizziness, syncope, chest pain, shortness of breath, dyspnea on exertion, abdominal pain, vomiting, diarrhea, bloody or black stools, vomiting blood or any other concerning symptoms  Patient does note she typically has heavy menses for 1-3 days every month  Prior to Admission Medications   Prescriptions Last Dose Informant Patient Reported?  Taking?   acetaminophen (TYLENOL) 325 mg tablet   No No   Sig: Take 2 tablets (650 mg total) by mouth every 6 (six) hours as needed for mild pain   albuterol (PROVENTIL HFA,VENTOLIN HFA) 90 mcg/act inhaler   Yes No   Sig: Inhale 2 puffs every 6 (six) hours as needed for shortness of breath   clotrimazole-betamethasone (LOTRISONE) 1-0 05 % cream   No No   Sig: Apply topically 2 (two) times a day   naproxen (Naprosyn) 500 mg tablet   No No   Sig: Take 1 tablet (500 mg total) by mouth 2 (two) times a day as needed for mild pain   senna-docusate sodium (SENOKOT S) 8 6-50 mg per tablet   No No   Sig: Take 1 tablet by mouth daily   tamsulosin (FLOMAX) 0 4 mg   No No   Sig: Take 1 capsule (0 4 mg total) by mouth daily with dinner   Patient not taking: Reported on 2022   topiramate (TOPAMAX) 25 mg tablet   Yes No      Facility-Administered Medications: None       Past Medical History:   Diagnosis Date   • Anemia    • Asthma        Past Surgical History:   Procedure Laterality Date   •  SECTION      4 times   • CHOLECYSTECTOMY LAPAROSCOPIC N/A 2020    Procedure: CHOLECYSTECTOMY LAPAROSCOPIC;  Surgeon: Orvan Guardian; Location: [facility];  Service: General   • GASTRECTOMY SLEEVE LAPAROSCOPIC         No family history on file  I have reviewed and agree with the history as documented  E-Cigarette/Vaping   • E-Cigarette Use Never User      E-Cigarette/Vaping Substances   • Nicotine No    • THC No    • CBD No    • Flavoring No    • Other No      Social History     Tobacco Use   • Smoking status: Never   • Smokeless tobacco: Never   Vaping Use   • Vaping Use: Never used   Substance Use Topics   • Alcohol use: Yes     Comment: socially   • Drug use: No       Review of Systems   Constitutional: Negative for fever  HENT: Negative for congestion, rhinorrhea and sore throat  Respiratory: Negative for shortness of breath  Cardiovascular: Negative for chest pain  Gastrointestinal: Negative for abdominal pain, nausea and vomiting  Genitourinary: Positive for flank pain  Negative for dysuria  Musculoskeletal: Negative for back pain and neck pain  Skin: Negative for rash  Neurological: Negative for weakness, numbness and headaches  All other systems reviewed and are negative  Physical Exam  Physical Exam  Constitutional:       General: She is not in acute distress  Appearance: Normal appearance  She is well-developed and well-nourished  She is not ill-appearing  HENT:      Head: Normocephalic and atraumatic        Nose: Nose normal    Eyes:      Extraocular Movements: EOM normal  Conjunctiva/sclera: Conjunctivae normal    Cardiovascular:      Rate and Rhythm: Normal rate  Heart sounds: Normal heart sounds  Pulmonary:      Effort: Pulmonary effort is normal  No respiratory distress  Breath sounds: Normal breath sounds  No stridor  No wheezing, rhonchi or rales  Abdominal:      Palpations: Abdomen is soft  Tenderness: There is no abdominal tenderness  Comments: Mild TTP of left flank/back  No TTP of RUQ, LUQ, epigastric area, LLQ, RLQ, suprapubic, or periumbilical area   Musculoskeletal:         General: Normal range of motion  Cervical back: Normal range of motion  Skin:     General: Skin is warm  Capillary Refill: Capillary refill takes less than 2 seconds  Neurological:      Mental Status: She is alert and oriented to person, place, and time     Psychiatric:         Mood and Affect: Mood and affect normal          Vital Signs  ED Triage Vitals   Temperature Pulse Respirations Blood Pressure SpO2   11/22/22 1110 11/22/22 1110 11/22/22 1110 11/22/22 1110 11/22/22 1110   97 8 °F (36 6 °C) 78 18 110/80 100 %      Temp Source Heart Rate Source Patient Position - Orthostatic VS BP Location FiO2 (%)   11/22/22 1110 11/22/22 1110 11/22/22 1110 11/22/22 1110 --   Oral Monitor Sitting Left arm       Pain Score       11/22/22 1212       9           Vitals:    11/22/22 1110 11/22/22 1517 11/22/22 1519 11/22/22 1836   BP: 110/80 93/54 100/57 106/58   Pulse: 78 87  70   Patient Position - Orthostatic VS: Sitting   Sitting         Visual Acuity      ED Medications  Medications   sodium chloride 0 9 % bolus 1,000 mL (0 mL Intravenous Stopped 11/22/22 1718)   morphine injection 2 mg (2 mg Intravenous Given 11/22/22 1212)   iohexol (OMNIPAQUE) 350 MG/ML injection (SINGLE-DOSE) 100 mL (100 mL Intravenous Given 11/22/22 1757)   cephalexin (KEFLEX) capsule 500 mg (500 mg Oral Given 11/22/22 1853)       Diagnostic Studies  Results Reviewed     Procedure Component Value Units Date/Time    Comprehensive metabolic panel [[id number]]  (Abnormal) Collected: 11/22/22 1214    Lab Status: Final result Specimen: Blood from Arm, Left Updated: 11/22/22 1247     Sodium 139 mmol/L      Potassium 3 8 mmol/L      Chloride 112 mmol/L      CO2 23 mmol/L      ANION GAP 4 mmol/L      BUN 13 mg/dL      Creatinine 0 46 mg/dL      Glucose 99 mg/dL      Calcium 9 0 mg/dL      AST 10 U/L      ALT 14 U/L      Alkaline Phosphatase 54 U/L      Total Protein 7 1 g/dL      Albumin 3 7 g/dL      Total Bilirubin 0 28 mg/dL      eGFR 131 ml/min/1 73sq m     Narrative:      [place] guidelines for Chronic Kidney Disease (CKD):   •  Stage 1 with normal or high GFR (GFR > 90 mL/min/1 73 square meters)  •  Stage 2 Mild CKD (GFR = 60-89 mL/min/1 73 square meters)  •  Stage 3A Moderate CKD (GFR = 45-59 mL/min/1 73 square meters)  •  Stage 3B Moderate CKD (GFR = 30-44 mL/min/1 73 square meters)  •  Stage 4 Severe CKD (GFR = 15-29 mL/min/1 73 square meters)  •  Stage 5 End Stage CKD (GFR <15 mL/min/1 73 square meters)  Note: GFR calculation is accurate only with a steady state creatinine    Lipase [[id number]]  (Normal) Collected: 11/22/22 1214    Lab Status: Final result Specimen: Blood from Arm, Left Updated: 11/22/22 1247     Lipase 228 u/L     Urine Microscopic [[id number]]  (Abnormal) Collected: 11/22/22 1221    Lab Status: Final result Specimen: Urine, Clean Catch Updated: 11/22/22 1242     RBC, UA 30-50 /hpf      WBC, UA 2-4 /hpf      Epithelial Cells Occasional /hpf      Bacteria, UA Occasional /hpf      MUCUS THREADS Innumerable    UA w Reflex to Microscopic w Reflex to Culture [[id number]]  (Abnormal) Collected: 11/22/22 1221    Lab Status: Final result Specimen: Urine, Clean Catch Updated: 11/22/22 1239     Color, UA Light Yellow     Clarity, UA Clear     Specific [place], UA 1 020     pH, UA 5 5     Leukocytes, UA Trace     Nitrite, UA Negative     Protein, UA Trace mg/dl      Glucose, UA Negative mg/dl      Ketones, UA Negative mg/dl      Urobilinogen, UA <2 0 mg/dl      Bilirubin, UA Negative     Occult Blood, UA Moderate    POCT pregnancy, urine [[id number]]  (Normal) Resulted: 11/22/22 1228    Lab Status: Final result Updated: 11/22/22 1228     EXT Preg Test, Ur Negative     Control Valid    CBC and differential [[id number]]  (Abnormal) Collected: 11/22/22 1214    Lab Status: Final result Specimen: Blood from Arm, Left Updated: 11/22/22 1224     WBC 4 08 Thousand/uL      RBC 3 66 Million/uL      Hemoglobin 7 1 g/dL      Hematocrit 27 2 %      MCV 74 fL      MCH 19 4 pg      MCHC 26 1 g/dL      RDW 17 0 %      MPV 10 7 fL      Platelets 003 Thousands/uL      nRBC 0 /100 WBCs      Neutrophils Relative 69 %      Immat GRANS % 0 %      Lymphocytes Relative 20 %      Monocytes Relative 10 %      Eosinophils Relative 1 %      Basophils Relative 0 %      Neutrophils Absolute 2 79 Thousands/µL      Immature Grans Absolute 0 01 Thousand/uL      Lymphocytes Absolute 0 80 Thousands/µL      Monocytes Absolute 0 42 Thousand/µL      Eosinophils Absolute 0 05 Thousand/µL      Basophils Absolute 0 01 Thousands/µL                  CT abdomen pelvis with contrast   Final Result by Regina Jimenez MD (11/22 1819)         1  Bilateral nonobstructing renal calculi  2   Calcification in the right pelvis is unchanged, uncertain whether this is in the right ureter  3   Status post cholecystectomy with mild intrahepatic and extrahepatic biliary ductal dilatation which is frequently a normal finding postcholecystectomy  Workstation performed: [id number]                    Procedures  Procedures         ED Course  ED Course as of 11/26/22 1716 Tue Nov 22, 2022   1159 Blood Pressure: 110/80   1159 Temperature: 97 8 °F (36 6 °C)   1159 Pulse: 78   1159 Respirations: 18   1159 SpO2: 100 %   1237 WBC(!): 4 08   1237 Hemoglobin(!): 7 1  8 4 two years ago   Patient states she is anemic, does not take supplements  Asymptomatic at this time   1238 PREGNANCY TEST URINE: Negative   1241 Leukocytes, UA(!): Trace   1241 Blood, UA(!): Moderate   1259 Creatinine(!): 0 46   1259 TOTAL BILIRUBIN: 0 28   1259 Potassium: 3 8   1259 RBC, UA(!): 30-50   1259 WBC, UA(!): 2-4   1259 Bacteria, UA: Occasional   1259 MUCUS THREADS(!): Innumerable   1514 Patient resting comfortably  No acute distress  States that she scheduled an appointment for this Friday, 3 days from now for follow up of her anemia   1542 Blood Pressure: 100/57  Per chart review, patient has had numerous office visits with low blood pressure readings  systolic  Patient asymptomatic   1649 Patient resting comfortably in the ER, no acute distress  Declines needing pain medication at this time  1650 Patient signed out to Dr Jorge Morris, awaiting CT scan and re-evaluation  MDM     Patient extremely well appearing in the ER  Hgb did return at 7 1 Patient states that she has a history of anemia however has not been taking any iron supplements for a long time  The patient states that she has no symptoms related to this  Denies headache, dizziness, syncope, chest pain, shortness of breath, dyspnea on exertion, bloody or black stools, vomiting blood or any other concerning symptoms  Patient does note she does have heavy menstrual periods  Discussed case with attending who also saw patient in the ER  Pending CT scan, patient can follow up outpatient for anemia, no indications for emergent transfusion at this time  Patient states that she just called and scheduled an appointment with her PCP on Friday, 3 days from now, to follow up for this  Discussed taking iron supplements in the meantime  Discussed strict return instructions with patient  Patient signed out to Dr Jorge Morris, awaiting CT and re-evaluation       Disposition  Final diagnoses:   Anemia   UTI (urinary tract infection)     Time reflects when diagnosis was documented in both MDM as applicable and the Disposition within this note     Time User Action Codes Description Comment    11/22/2022  3:49 PM Ne Salcido [D64 9] Anemia     11/22/2022  6:41 PM Lula Teran [N39 0] UTI (urinary tract infection)       ED Disposition     ED Disposition   Discharge    Condition   Stable    Date/Time   Tue Nov 22, 2022  6:41 PM    Comment   Jose Martin Diesel discharge to home/self care  Follow-up Information    None         Discharge Medication List as of 11/22/2022  6:42 PM      START taking these medications    Details   cephalexin (KEFLEX) 500 mg capsule Take 1 capsule (500 mg total) by mouth every 8 (eight) hours for 7 days, Starting Tue 11/22/2022, Until Tue 11/29/2022, Normal         CONTINUE these medications which have NOT CHANGED    Details   acetaminophen (TYLENOL) 325 mg tablet Take 2 tablets (650 mg total) by mouth every 6 (six) hours as needed for mild pain, Starting Fri 7/24/2020, Normal      albuterol (PROVENTIL HFA,VENTOLIN HFA) 90 mcg/act inhaler Inhale 2 puffs every 6 (six) hours as needed for shortness of breath, Historical Med      clotrimazole-betamethasone (LOTRISONE) 1-0 05 % cream Apply topically 2 (two) times a day, Starting Thu 1/6/2022, Normal      naproxen (Naprosyn) 500 mg tablet Take 1 tablet (500 mg total) by mouth 2 (two) times a day as needed for mild pain, Starting Thu 11/17/2022, Normal      senna-docusate sodium (SENOKOT S) 8 6-50 mg per tablet Take 1 tablet by mouth daily, Starting Mon 10/31/2022, Normal      tamsulosin (FLOMAX) 0 4 mg Take 1 capsule (0 4 mg total) by mouth daily with dinner, Starting Thu 11/17/2022, Normal      topiramate (TOPAMAX) 25 mg tablet Starting Mon 5/23/2022, Historical Med      ferrous sulfate 325 (65 Fe) mg tablet Take 1 tablet (325 mg total) by mouth daily with breakfast, Starting Sat 7/25/2020, Normal             No discharge procedures on file      PDMP Review     None          ED Provider  Electronically Signed by           Kerline Cain PA-C  11/26/22 8270

## 2022-11-22 NOTE — ED NOTES
[facility]  Rheumatology Report of Consultation  Latasha Seals Patient Status:  Inpatient    10/3/1930 MRN [id number]   [facility] 5NW-A Attending Maximilian Huynh MD   [facility] Day # 1 PCP Reshma Archer MD     Date of Admission: 20 Patient transported to [facility], [address] [place] [address]  11/22/22 2589 

## 2022-11-22 NOTE — Clinical Note
Dillon Duggan was seen and treated in our emergency department on 11/22/2022  No restrictions            Diagnosis:     Joelle Kenny  may return to school on return date  She may return on this date: 11/23/2022         If you have any questions or concerns, please don't hesitate to call        Gianna Cabrera RN    ______________________________           _______________          _______________  Hospital Representative                              Date                                Time

## 2022-11-22 NOTE — ED ATTENDING ATTESTATION
11/22/2022  ILatha MD, saw and evaluated the patient  I have discussed the patient with the resident/non-physician practitioner and agree with the resident's/non-physician practitioner's findings, Plan of Care, and MDM as documented in the resident's/non-physician practitioner's note, except where noted  All available labs and Radiology studies were reviewed  I was present for key portions of any procedure(s) performed by the resident/non-physician practitioner and I was immediately available to provide assistance  At this point I agree with the current assessment done in the Emergency Department  I have conducted an independent evaluation of this patient a history and physical is as follows:    Patient presents to the emergency depart with a complaint of left flank pain  The patient reports she was in the emergency department last week was diagnosed with a right kidney stone  The patient reports the right flank pain she had is largely resolved  The patient reports she was doing well today when she had the sudden onset of left flank pain that felt similar to the pain she had with a kidney stone on the right side  The pain persisted until arrival and treatment here  The patient reported the pain did wax and wane during that time period  The patient denies any nausea, vomiting, chest pain, shortness of breath, headache, fever, melena, hematochezia, or hematemesis  On review of systems the patient denies dyspnea on exertion, any decrease in exercise tolerance, shortness of breath, chest pain, or limited exercise ability  The patient reports she occasionally notices blood on the toilet paper when she strains at hard stools but has not noticed any blood with her stools or any melanotic stools  The patient denies bleeding from the gums or easy bruising  The patient does admit to having heavy menstrual periods every month    The patient reports she had been prescribed iron in the past but stopped it  Physical exam demonstrates a pleasant alert nontoxic female who appears to be very comfortable  HEENT exam is normal   Lungs are clear with equal breath sounds  The heart had a regular rate rhythm  The abdomen is soft and completely nontender  There is no masses  There is no rebound or guarding  The back was nontender except for mild left lower lumbar paraspinal muscle tenderness  There is no midline spinal tenderness  Skin had no rash        ED Course         Critical Care Time  Procedures

## 2022-11-25 ENCOUNTER — OFFICE VISIT (OUTPATIENT)
Dept: FAMILY MEDICINE CLINIC | Facility: CLINIC | Age: 32
End: 2022-11-25

## 2022-11-25 VITALS
WEIGHT: 158.8 LBS | HEART RATE: 89 BPM | BODY MASS INDEX: 29.04 KG/M2 | DIASTOLIC BLOOD PRESSURE: 59 MMHG | OXYGEN SATURATION: 99 % | TEMPERATURE: 98.7 F | SYSTOLIC BLOOD PRESSURE: 102 MMHG

## 2022-11-25 DIAGNOSIS — N92.0 MENORRHAGIA WITH REGULAR CYCLE: ICD-10-CM

## 2022-11-25 DIAGNOSIS — K80.50 CHOLEDOCHOLITHIASIS: ICD-10-CM

## 2022-11-25 DIAGNOSIS — Z13.220 ENCOUNTER FOR LIPID SCREENING FOR CARDIOVASCULAR DISEASE: ICD-10-CM

## 2022-11-25 DIAGNOSIS — R68.89 FORGETFULNESS: ICD-10-CM

## 2022-11-25 DIAGNOSIS — R45.89 DEPRESSED MOOD: ICD-10-CM

## 2022-11-25 DIAGNOSIS — D50.9 IRON DEFICIENCY ANEMIA, UNSPECIFIED IRON DEFICIENCY ANEMIA TYPE: Primary | ICD-10-CM

## 2022-11-25 DIAGNOSIS — Z13.6 ENCOUNTER FOR LIPID SCREENING FOR CARDIOVASCULAR DISEASE: ICD-10-CM

## 2022-11-25 DIAGNOSIS — R68.89 COLD INTOLERANCE: ICD-10-CM

## 2022-11-25 RX ORDER — FERROUS SULFATE 325(65) MG
325 TABLET ORAL
Qty: 30 TABLET | Refills: 0 | Status: SHIPPED | OUTPATIENT
Start: 2022-11-25

## 2022-11-25 NOTE — ASSESSMENT & PLAN NOTE
On 11/22 pt found to have bilateral nonobstructive renal calculi  Continue treatment prescribed at ED: flomax, keflex, and naproxen

## 2022-11-25 NOTE — PROGRESS NOTES
Name: Tomasita Hatchet      : 1990      MRN: [id number]  Encounter Provider: Maryuri Banerjee DO  Encounter Date: 2022   Encounter department: [facility] [address] [place] [address]    Assessment & Plan     1  Iron deficiency anemia, unspecified iron deficiency anemia type  Assessment & Plan:  - continue Iron supplements daily with senokot to treat adverse reaction of constipation  - recheck CBC and retic count in 2 months  - f/u appt with PCP after blood work     Orders:  -     ferrous sulfate 325 (65 Fe) mg tablet; Take 1 tablet (325 mg total) by mouth daily with breakfast  -     CBC and Platelet; Future; Expected date: 2023  -     US pelvis complete w transvaginal; Future; Expected date: 2022  -     Retic Count; Future; Expected date: 2023    2  Choledocholithiasis  Assessment & Plan:  On  pt found to have bilateral nonobstructive renal calculi  Continue treatment prescribed at ED: flomax, keflex, and naproxen  3  Encounter for lipid screening for cardiovascular disease  -     Lipid panel; Future    4  Depressed mood  -     Vitamin D 25 hydroxy; Future  -     TSH, 3rd generation with Free T4 reflex; Future    5  Menorrhagia with regular cycle  -     US pelvis complete w transvaginal; Future; Expected date: 2022    6  Forgetfulness  -     TSH, 3rd generation with Free T4 reflex; Future    7  Cold intolerance  -     TSH, 3rd generation with Free T4 reflex; Future           Subjective     HPI   Tomasita Hatchet is a 34 y o  female for ED follow up  Pt went ot ED on  for flank pain  She was found to have bilateral nonbstructing renal calculi on imaging and was prescribed Flomax, Keflex, and Naproxen  Pt's pain is well controlled and she denies hematuria or dysuria  Patient was also found to have anemia with hgb 7 1  Pt has history of iron deficient anemia  She does not take iron supplements because they cause constipation   She was recommended to take iron supplements with stool softener at the ED and is complaint with these recommendations  Patient admits to cold intolerance, forgetfulness, dizziness, lightheadedness  Patient denies syncope, chest pain, abdominal pain, blood in stool, headaches, hematuria  Patient reports heavy menstrual periods  She goes through about 4-5 pads/tampons per day  Periods last 5-6 days and come monthly  They are not very painful  Pt started her period at age 4  Review of Systems  All systems negative except for what is recorded in HPI  Past Medical History:   Diagnosis Date   • Anemia    • Asthma      Past Surgical History:   Procedure Laterality Date   •  SECTION      4 times   • CHOLECYSTECTOMY LAPAROSCOPIC N/A 2020    Procedure: CHOLECYSTECTOMY LAPAROSCOPIC;  Surgeon: Katherine Vides; Location: [facility];  Service: General   • GASTRECTOMY SLEEVE LAPAROSCOPIC       History reviewed  No pertinent family history  Social History     Socioeconomic History   • Marital status: /Civil Union     Spouse name: None   • Number of children: None   • Years of education: None   • Highest education level: None   Occupational History   • None   Tobacco Use   • Smoking status: Never   • Smokeless tobacco: Never   Vaping Use   • Vaping Use: Never used   Substance and Sexual Activity   • Alcohol use: Yes     Comment: socially   • Drug use: No   • Sexual activity: Not Currently     Partners: Male   Other Topics Concern   • None   Social History Narrative   • None     Social Determinants of Health     Financial Resource Strain: Low Risk    • Difficulty of Paying Living Expenses: Not hard at all   Food Insecurity: No Food Insecurity   • Worried About Running Out of Food in the Last Year: Never true   • Ran Out of Food in the Last Year: Never true   Transportation Needs: No Transportation Needs   • Lack of Transportation (Medical): No   • Lack of Transportation (Non-Medical):  No   Physical Activity: Inactive   • Days of Exercise per Week: 0 days   • Minutes of Exercise per Session: 0 min   Stress: No Stress Concern Present   • Feeling of Stress : Not at all   Social Connections: Not on file   Intimate Partner Violence: Not At Risk   • Fear of Current or Ex-Partner: No   • Emotionally Abused: No   • Physically Abused: No   • Sexually Abused: No   Housing Stability: Low Risk    • Unable to Pay for Housing in the Last Year: No   • Number of Places Lived in the Last Year: 1   • Unstable Housing in the Last Year: No     Current Outpatient Medications on File Prior to Visit   Medication Sig   • acetaminophen (TYLENOL) 325 mg tablet Take 2 tablets (650 mg total) by mouth every 6 (six) hours as needed for mild pain   • albuterol (PROVENTIL HFA,VENTOLIN HFA) 90 mcg/act inhaler Inhale 2 puffs every 6 (six) hours as needed for shortness of breath   • cephalexin (KEFLEX) 500 mg capsule Take 1 capsule (500 mg total) by mouth every 8 (eight) hours for 7 days   • clotrimazole-betamethasone (LOTRISONE) 1-0 05 % cream Apply topically 2 (two) times a day   • naproxen (Naprosyn) 500 mg tablet Take 1 tablet (500 mg total) by mouth 2 (two) times a day as needed for mild pain   • senna-docusate sodium (SENOKOT S) 8 6-50 mg per tablet Take 1 tablet by mouth daily   • topiramate (TOPAMAX) 25 mg tablet    • tamsulosin (FLOMAX) 0 4 mg Take 1 capsule (0 4 mg total) by mouth daily with dinner (Patient not taking: Reported on 11/25/2022)   • [DISCONTINUED] ferrous sulfate 325 (65 Fe) mg tablet Take 1 tablet (325 mg total) by mouth daily with breakfast (Patient not taking: Reported on 11/25/2022)     No Known Allergies  Immunization History   Administered Date(s) Administered   • COVID-19 PFIZER VACCINE 0 3 ML IM 04/01/2021, 04/22/2021, 02/25/2022   • INFLUENZA 02/17/2021   • Influenza, seasonal, injectable 10/17/2014, 10/22/2015   • Pneumococcal Conjugate Vaccine 20-valent (Pcv20), Polysace 05/23/2022   • Tdap 05/23/2022       Objective /59 (BP Location: Left arm, Patient Position: Sitting, Cuff Size: Standard)   Pulse 89   Temp 98 7 °F (37 1 °C) (Temporal)   Wt 72 kg (158 lb 12 8 oz)   LMP 11/06/2022   SpO2 99%   BMI 29 04 kg/m²     Physical Exam  Constitutional:       General: She is not in acute distress  Appearance: Normal appearance  HENT:      Head: Normocephalic and atraumatic  Right Ear: External ear normal       Left Ear: External ear normal       Mouth/Throat:      Pharynx: Oropharynx is clear  Eyes:      Extraocular Movements: Extraocular movements intact  Conjunctiva/sclera: Conjunctivae normal       Comments: Pale conjunctiva   Cardiovascular:      Rate and Rhythm: Normal rate and regular rhythm  Pulses: Normal pulses  Heart sounds: Normal heart sounds  Pulmonary:      Effort: Pulmonary effort is normal  No respiratory distress  Breath sounds: Normal breath sounds  No wheezing, rhonchi or rales  Abdominal:      General: Bowel sounds are normal       Palpations: Abdomen is soft  There is no mass  Tenderness: There is no abdominal tenderness  There is no right CVA tenderness or left CVA tenderness  Musculoskeletal:      Cervical back: Normal range of motion and neck supple  No tenderness  Right lower leg: No edema  Left lower leg: No edema  Lymphadenopathy:      Cervical: No cervical adenopathy  Skin:     General: Skin is warm and dry  Capillary Refill: Capillary refill takes less than 2 seconds  Neurological:      General: No focal deficit present  Mental Status: She is alert and oriented to person, place, and time  Gait: Gait normal    Psychiatric:         Mood and Affect: Mood normal          Behavior: Behavior normal          Thought Content:  Thought content normal          Judgment: Judgment normal        Abram Sensing, DO

## 2022-11-25 NOTE — ASSESSMENT & PLAN NOTE
- continue Iron supplements daily with senokot to treat adverse reaction of constipation  - recheck CBC and retic count in 2 months     - f/u appt with PCP after blood work

## 2022-12-17 DIAGNOSIS — D50.9 IRON DEFICIENCY ANEMIA, UNSPECIFIED IRON DEFICIENCY ANEMIA TYPE: ICD-10-CM

## 2022-12-20 RX ORDER — FERROUS SULFATE 325(65) MG
TABLET ORAL
Qty: 90 TABLET | Refills: 1 | Status: SHIPPED | OUTPATIENT
Start: 2022-12-20

## 2023-02-09 ENCOUNTER — OFFICE VISIT (OUTPATIENT)
Dept: FAMILY MEDICINE CLINIC | Facility: CLINIC | Age: 33
End: 2023-02-09

## 2023-02-09 VITALS
HEIGHT: 62 IN | TEMPERATURE: 97.9 F | BODY MASS INDEX: 30.95 KG/M2 | RESPIRATION RATE: 18 BRPM | SYSTOLIC BLOOD PRESSURE: 104 MMHG | DIASTOLIC BLOOD PRESSURE: 60 MMHG | HEART RATE: 72 BPM | WEIGHT: 168.2 LBS

## 2023-02-09 DIAGNOSIS — N20.1 URETEROLITHIASIS: Primary | ICD-10-CM

## 2023-02-09 DIAGNOSIS — D50.9 IRON DEFICIENCY ANEMIA, UNSPECIFIED IRON DEFICIENCY ANEMIA TYPE: ICD-10-CM

## 2023-02-09 DIAGNOSIS — R51.9 NONINTRACTABLE HEADACHE, UNSPECIFIED CHRONICITY PATTERN, UNSPECIFIED HEADACHE TYPE: ICD-10-CM

## 2023-02-09 DIAGNOSIS — Z11.59 ENCOUNTER FOR HEPATITIS C SCREENING TEST FOR LOW RISK PATIENT: ICD-10-CM

## 2023-02-09 DIAGNOSIS — Z11.4 SCREENING FOR HIV (HUMAN IMMUNODEFICIENCY VIRUS): ICD-10-CM

## 2023-02-09 RX ORDER — NAPROXEN 500 MG/1
500 TABLET ORAL 2 TIMES DAILY WITH MEALS
Qty: 28 TABLET | Refills: 0 | Status: SHIPPED | OUTPATIENT
Start: 2023-02-09 | End: 2023-02-23

## 2023-02-09 NOTE — ASSESSMENT & PLAN NOTE
ED visits in November for flank pain and pressure when urinating  Found to have kidney stones  CT renal stone study 11/15 "RIGHT KIDNEY AND URETER:  3 mm distal ureteral calculus at the vesicoureteral junction (series 601, image 80)  Mild hydroureteronephrosis  Multiple nonobstructing renal calculi measuring up to 3 mm  LEFT KIDNEY AND URETER: There are nonobstructing intrarenal calculi measuring on the order of 5 mm  No hydronephrosis or hydroureter "   CT 11/22:   1  Bilateral nonobstructing renal calculi  2   Calcification in the right pelvis is unchanged, uncertain whether this is in the right ureter  3   Status post cholecystectomy with mild intrahepatic and extrahepatic biliary ductal dilatation which is frequently a normal finding postcholecystectomy  Unclear if this is cause for flank pain however pt reports pain and symptoms are much improved  - continue to encourage adequate hydration, supportive care, tylenol PRN  - completed flomax course    - pt has not observed any passage of stone in urine   - missed urology appt 12/19   Pt will call urology office to reschedule

## 2023-02-09 NOTE — ASSESSMENT & PLAN NOTE
Continue ferrous sulfate 325 mg and senokot   Has headaches and some intermittent lightheadedness which resolves quickly  - recheck CBC and retic count, labwork was ordered previous visit but not completed   Encouraged pt to complete labwork for f/u    - f/u 2 weeks

## 2023-02-09 NOTE — ASSESSMENT & PLAN NOTE
New headaches onset 2 months ago    Starting from back radiating bilaterally, relieved mildly with tylenol and rest in a dark room  Endorses some stress with children, does not work  Symptomatology concerning for tension type headaches vs migraine  Could also be associated with pt's anemia  Mother has hx of migraines treated with botox  - education on headache types reviewed with patient  - prefers conservative management for now  - recommend adequate hydration and nutrition throughout the day as well as stress management  - trial naproxen 500 mg BID x14 days   - f/u 2 weeks for headache   Consider at this time if pt needs abortive vs maintenance medication

## 2023-02-09 NOTE — PATIENT INSTRUCTIONS
Tension Headache   WHAT YOU NEED TO KNOW:   Tension headaches are most often caused by stress, eye strain, or muscle tightness  The pain of a tension headache may start in the forehead or the back of the head  The pain often spreads over the whole head and down into the neck and shoulders  Over-the-counter pain medicine is the most useful and common treatment for a tension headache  Exercise, biofeedback, meditation, or relaxation techniques may also decrease your headache pain  DISCHARGE INSTRUCTIONS:   Call your local emergency number (911 in the [address]) if:   You have difficulty seeing, speaking, or moving  You have a seizure  Call your doctor if:   You pass out or become confused  You have a sudden headache that seems different or much worse than your usual headaches  You have a headache, fever, and a stiff neck  Your headaches continue to get worse  Your headaches happen so often that they affect your ability to do your work or normal activities  You need to take medicine to help your headaches more often than your healthcare provider says you should  Your headaches get so bad that they cause you to vomit  You have questions or concerns about your condition or care  Medicines:   NSAIDs , such as ibuprofen, help decrease swelling, pain, and fever  This medicine is available with or without a doctor's order  NSAIDs can cause stomach bleeding or kidney problems in certain people  If you take blood thinner medicine, always ask your healthcare provider if NSAIDs are safe for you  Always read the medicine label and follow directions  Acetaminophen  decreases pain and fever  It is available without a doctor's order  Ask how much to take and how often to take it  Follow directions  Read the labels of all other medicines you are using to see if they also contain acetaminophen, or ask your doctor or pharmacist  Acetaminophen can cause liver damage if not taken correctly   Do not use more than 4 grams (4,000 milligrams) total of acetaminophen in one day  Take your medicine as directed  Contact your healthcare provider if you think your medicine is not helping or if you have side effects  Tell him of her if you are allergic to any medicine  Keep a list of the medicines, vitamins, and herbs you take  Include the amounts, and when and why you take them  Bring the list or the pill bottles to follow-up visits  Carry your medicine list with you in case of an emergency  Manage your symptoms:   Keep a headache record  Include when the headaches start and stop and what made them better  Describe your symptoms, such as how the pain feels, where it is, and how bad it is  Record anything you ate or drank for the past 24 hours before your headache  Bring this to follow-up visits  Apply heat as directed  Heat may help decrease headache pain and muscle spasms  Apply heat on the area for 20 to 30 minutes every 2 hours for as many days as directed  A warm bath may also help relieve muscle tension and spasms  Apply ice as directed  Ice may help decrease headache pain  Use an ice pack or put crushed ice in a plastic bag  Cover it with a towel and place it on the area for 15 to 20 minutes every hour as directed  Ask about spinal manipulation  This treatment combines movement, massage, exercise, and physical therapy  It can help relieve a tension headache, and may be able to prevent another if done regularly  Talk to your healthcare provider to make sure it is safe for you  To prevent neck injuries, spinal manipulation should only be done by trained providers  Prevent tension headaches:   Avoid muscle tension  Do not stay in one position for long periods of time  Use a different pillow if you wake up with sore neck and shoulder muscles  Find ways to relax your muscles, such as massage or resting in a quiet, dark room  Avoid eye strain    Make sure you have good lighting when you read, sew, or do similar activities  Get yearly eye exams and wear glasses as directed  Get enough sleep  Get 8 to 10 hours of sleep each night  Create a sleep schedule  Go to bed and wake up at the same times each day  It may be helpful to do something relaxing before bed  Do not watch television right before bed  Eat a variety of healthy foods  Healthy foods include fruits, vegetables, whole-grain breads, low-fat dairy products, beans, lean meats, and fish  Do not eat foods that trigger your headaches  Exercise regularly  Exercise helps decrease stress and headaches  Ask about the best exercise plan for you  Drink liquids as directed  You may need to drink more liquid to prevent dehydration  Dehydration can make a tension headache worse  Ask your healthcare provider how much liquid to drink and which liquids are best for you  Limit caffeine as directed  Caffeine may make a tension headache worse  Do not drink alcohol  Alcohol can trigger a headache  It can also prevent medicines from stopping your headache  Do not smoke  Nicotine and other chemicals in cigarettes and cigars can trigger a headache and also cause lung damage  Ask your healthcare provider for information if you currently smoke and need help to quit  E-cigarettes or smokeless tobacco still contain nicotine  Talk to your healthcare provider before you use these products  Follow up with your healthcare provider as directed:  Bring the headache record with you  Write down your questions so you remember to ask them during your visits  © Copyright [employer] 2022 Information is for End User's use only and may not be sold, redistributed or otherwise used for commercial purposes  All illustrations and images included in CareNotes® are the copyrighted property of [employer]  or [employer]   The above information is an  only  It is not intended as medical advice for individual conditions or treatments   Talk to your doctor, nurse or pharmacist before following any medical regimen to see if it is safe and effective for you

## 2023-02-09 NOTE — PROGRESS NOTES
Name: Monserrat Estrella      : 1990      MRN: [id number]  Encounter Provider: Jacklyn Zavala MD  Encounter Date: 2023   Encounter department: [address] [place] [address]     1  Ureterolithiasis  Assessment & Plan:  ED visits in November for flank pain and pressure when urinating  Found to have kidney stones  CT renal stone study 11/15 "RIGHT KIDNEY AND URETER:  3 mm distal ureteral calculus at the vesicoureteral junction (series 601, image 80)  Mild hydroureteronephrosis  Multiple nonobstructing renal calculi measuring up to 3 mm  LEFT KIDNEY AND URETER: There are nonobstructing intrarenal calculi measuring on the order of 5 mm  No hydronephrosis or hydroureter "   CT :   1  Bilateral nonobstructing renal calculi  2   Calcification in the right pelvis is unchanged, uncertain whether this is in the right ureter  3   Status post cholecystectomy with mild intrahepatic and extrahepatic biliary ductal dilatation which is frequently a normal finding postcholecystectomy  Unclear if this is cause for flank pain however pt reports pain and symptoms are much improved  - continue to encourage adequate hydration, supportive care, tylenol PRN  - completed flomax course    - pt has not observed any passage of stone in urine   - missed urology appt   Pt will call urology office to reschedule       2  Nonintractable headache, unspecified chronicity pattern, unspecified headache type  Assessment & Plan:  New headaches onset 2 months ago    Starting from back radiating bilaterally, relieved mildly with tylenol and rest in a dark room  Endorses some stress with children, does not work  Symptomatology concerning for tension type headaches vs migraine   Could also be associated with pt's anemia  Mother has hx of migraines treated with botox  - education on headache types reviewed with patient  - prefers conservative management for now  - recommend adequate hydration and nutrition throughout the day as well as stress management  - trial naproxen 500 mg BID x14 days   - f/u 2 weeks for headache  Consider at this time if pt needs abortive vs maintenance medication     Orders:  -     naproxen (Naprosyn) 500 mg tablet; Take 1 tablet (500 mg total) by mouth 2 (two) times a day with meals for 14 days    3  Screening for HIV (human immunodeficiency virus)  -     : HIV 1/2 AB/AG w Reflex [facility] for 2 yr old and above; Future    4  Encounter for hepatitis C screening test for low risk patient  -     Hepatitis C antibody; Future    5  Iron deficiency anemia, unspecified iron deficiency anemia type  Assessment & Plan:  Continue ferrous sulfate 325 mg and senokot   Has headaches and some intermittent lightheadedness which resolves quickly  - recheck CBC and retic count, labwork was ordered previous visit but not completed  Encouraged pt to complete labwork for f/u    - f/u 2 weeks            Subjective      HPI   33 yo female presenting for f/u ureterolithiasis  Pt reports pain from kidney stones is a lot better  Only coming and going now, but even when it comes it is not as bad as before  Not an every day thing  Keeping up with hydration  Pt is done taking flomax  Never saw urology- she was not aware she had an appointment  Pt reports headaches, really bad headaches that started in November  Wake up, ok for a few hours then it will come out of nowhere  Feels like "something is hitting back of her head" then radiates to front or on her forehead, feels some pulsing at her temple, when she feels it she just wants to go to sleep  When she goes to sleep and wakes up she feels better  Worse with loud noises, not affected by lights  Takes tylenol for the headaches which helps sometimes, pain will still come and go  Tried excedrin and motrin migraines which did not do anything  If she does not take anything, it will last the whole day until she goes to sleep   Occurrence 3-4 times a week, happening a lot  Laying in a dark room and putting ice on forehead helps a bit  Never had headaches like this before  Mom has hx of migraines she gets botox for  Pt reports stress with oldest son however this is not new  Sometimes she will also get dizzy out of nowhere when she is sitting or standing and it will go away very quickly  Pt also sees therapy and asking for referral for neurology  Apparently therapist recommended neurology referral bc "patient is forgetful "    Review of Systems   Constitutional: Negative for chills and fever  HENT: Negative for ear pain and sore throat  Eyes: Negative for pain and visual disturbance  Respiratory: Negative for cough and shortness of breath  Cardiovascular: Negative for chest pain and palpitations  Gastrointestinal: Negative for abdominal pain and vomiting  Genitourinary: Negative for dysuria and hematuria  Musculoskeletal: Negative for arthralgias and back pain  Skin: Negative for color change and rash  Neurological: Positive for light-headedness and headaches  Negative for seizures, syncope and weakness  Psychiatric/Behavioral: Negative for agitation and confusion  All other systems reviewed and are negative        Current Outpatient Medications on File Prior to Visit   Medication Sig   • acetaminophen (TYLENOL) 325 mg tablet Take 2 tablets (650 mg total) by mouth every 6 (six) hours as needed for mild pain   • albuterol (PROVENTIL HFA,VENTOLIN HFA) 90 mcg/act inhaler Inhale 2 puffs every 6 (six) hours as needed for shortness of breath   • clotrimazole-betamethasone (LOTRISONE) 1-0 05 % cream Apply topically 2 (two) times a day   • ferrous sulfate 325 (65 Fe) mg tablet TAKE 1 TABLET BY MOUTH EVERY DAY WITH BREAKFAST   • naproxen (Naprosyn) 500 mg tablet Take 1 tablet (500 mg total) by mouth 2 (two) times a day as needed for mild pain   • senna-docusate sodium (SENOKOT S) 8 6-50 mg per tablet Take 1 tablet by mouth daily   • topiramate (TOPAMAX) 25 mg tablet    • tamsulosin (FLOMAX) 0 4 mg Take 1 capsule (0 4 mg total) by mouth daily with dinner (Patient not taking: Reported on 2/9/2023)       Objective     /60 (BP Location: Left arm, Patient Position: Sitting, Cuff Size: Large)   Pulse 72   Temp 97 9 °F (36 6 °C) (Temporal)   Resp 18   Ht 5' 2" (1 575 m)   Wt 76 3 kg (168 lb 3 2 oz)   LMP  (LMP Unknown)   BMI 30 76 kg/m²     Physical Exam  Vitals reviewed  Constitutional:       General: She is not in acute distress  Appearance: She is well-developed  HENT:      Head: Normocephalic and atraumatic  Eyes:      Extraocular Movements: Extraocular movements intact  Conjunctiva/sclera: Conjunctivae normal       Pupils: Pupils are equal, round, and reactive to light  Cardiovascular:      Rate and Rhythm: Normal rate and regular rhythm  Heart sounds: Normal heart sounds  No murmur heard  Pulmonary:      Effort: Pulmonary effort is normal  No respiratory distress  Breath sounds: Normal breath sounds  No wheezing or rales  Abdominal:      General: Bowel sounds are normal  There is no distension  Palpations: Abdomen is soft  Tenderness: There is no abdominal tenderness  There is no right CVA tenderness or left CVA tenderness  Musculoskeletal:         General: No tenderness or deformity  Normal range of motion  Cervical back: Normal range of motion and neck supple  Skin:     General: Skin is warm and dry  Findings: No rash  Neurological:      General: No focal deficit present  Mental Status: She is alert and oriented to person, place, and time  Mental status is at baseline  Motor: No weakness        Coordination: Coordination normal       Gait: Gait normal    Psychiatric:         Mood and Affect: Mood normal          Behavior: Behavior normal        Bibi Aguilar MD

## 2023-03-01 ENCOUNTER — APPOINTMENT (OUTPATIENT)
Dept: LAB | Facility: CLINIC | Age: 33
End: 2023-03-01

## 2023-03-01 DIAGNOSIS — R68.89 COLD INTOLERANCE: ICD-10-CM

## 2023-03-01 DIAGNOSIS — Z11.59 ENCOUNTER FOR HEPATITIS C SCREENING TEST FOR LOW RISK PATIENT: ICD-10-CM

## 2023-03-01 DIAGNOSIS — D50.9 IRON DEFICIENCY ANEMIA, UNSPECIFIED IRON DEFICIENCY ANEMIA TYPE: ICD-10-CM

## 2023-03-01 DIAGNOSIS — F31.32 MODERATE DEPRESSED BIPOLAR I DISORDER (HCC): ICD-10-CM

## 2023-03-01 DIAGNOSIS — Z11.4 SCREENING FOR HIV (HUMAN IMMUNODEFICIENCY VIRUS): ICD-10-CM

## 2023-03-01 DIAGNOSIS — R10.11 RUQ ABDOMINAL PAIN: ICD-10-CM

## 2023-03-01 DIAGNOSIS — R45.89 DEPRESSED MOOD: ICD-10-CM

## 2023-03-01 DIAGNOSIS — Z13.220 ENCOUNTER FOR LIPID SCREENING FOR CARDIOVASCULAR DISEASE: ICD-10-CM

## 2023-03-01 DIAGNOSIS — R68.89 FORGETFULNESS: ICD-10-CM

## 2023-03-01 DIAGNOSIS — Z13.6 ENCOUNTER FOR LIPID SCREENING FOR CARDIOVASCULAR DISEASE: ICD-10-CM

## 2023-03-01 LAB
25(OH)D3 SERPL-MCNC: 13.2 NG/ML (ref 30–100)
ALBUMIN SERPL BCP-MCNC: 3.5 G/DL (ref 3.5–5)
ALP SERPL-CCNC: 49 U/L (ref 46–116)
ALT SERPL W P-5'-P-CCNC: 13 U/L (ref 12–78)
ANION GAP SERPL CALCULATED.3IONS-SCNC: 5 MMOL/L (ref 4–13)
AST SERPL W P-5'-P-CCNC: 11 U/L (ref 5–45)
BASOPHILS # BLD AUTO: 0.02 THOUSANDS/ÂΜL (ref 0–0.1)
BASOPHILS NFR BLD AUTO: 1 % (ref 0–1)
BILIRUB SERPL-MCNC: 0.37 MG/DL (ref 0.2–1)
BUN SERPL-MCNC: 15 MG/DL (ref 5–25)
CALCIUM SERPL-MCNC: 8.7 MG/DL (ref 8.3–10.1)
CHLORIDE SERPL-SCNC: 111 MMOL/L (ref 96–108)
CHOLEST SERPL-MCNC: 161 MG/DL
CO2 SERPL-SCNC: 23 MMOL/L (ref 21–32)
CREAT SERPL-MCNC: 0.47 MG/DL (ref 0.6–1.3)
EOSINOPHIL # BLD AUTO: 0.09 THOUSAND/ÂΜL (ref 0–0.61)
EOSINOPHIL NFR BLD AUTO: 2 % (ref 0–6)
ERYTHROCYTE [DISTWIDTH] IN BLOOD BY AUTOMATED COUNT: 17.2 % (ref 11.6–15.1)
GFR SERPL CREATININE-BSD FRML MDRD: 130 ML/MIN/1.73SQ M
GLUCOSE P FAST SERPL-MCNC: 93 MG/DL (ref 65–99)
HCT VFR BLD AUTO: 29.4 % (ref 34.8–46.1)
HCV AB SER QL: NORMAL
HDLC SERPL-MCNC: 66 MG/DL
HGB BLD-MCNC: 7.8 G/DL (ref 11.5–15.4)
HIV 1+2 AB+HIV1 P24 AG SERPL QL IA: NORMAL
HIV 2 AB SERPL QL IA: NORMAL
HIV1 AB SERPL QL IA: NORMAL
HIV1 P24 AG SERPL QL IA: NORMAL
IMM GRANULOCYTES # BLD AUTO: 0 THOUSAND/UL (ref 0–0.2)
IMM GRANULOCYTES NFR BLD AUTO: 0 % (ref 0–2)
LDLC SERPL CALC-MCNC: 82 MG/DL (ref 0–100)
LIPASE SERPL-CCNC: 252 U/L (ref 73–393)
LYMPHOCYTES # BLD AUTO: 1 THOUSANDS/ÂΜL (ref 0.6–4.47)
LYMPHOCYTES NFR BLD AUTO: 27 % (ref 14–44)
MCH RBC QN AUTO: 19.5 PG (ref 26.8–34.3)
MCHC RBC AUTO-ENTMCNC: 26.5 G/DL (ref 31.4–37.4)
MCV RBC AUTO: 74 FL (ref 82–98)
MONOCYTES # BLD AUTO: 0.24 THOUSAND/ÂΜL (ref 0.17–1.22)
MONOCYTES NFR BLD AUTO: 6 % (ref 4–12)
NEUTROPHILS # BLD AUTO: 2.38 THOUSANDS/ÂΜL (ref 1.85–7.62)
NEUTS SEG NFR BLD AUTO: 64 % (ref 43–75)
NONHDLC SERPL-MCNC: 95 MG/DL
NRBC BLD AUTO-RTO: 0 /100 WBCS
PLATELET # BLD AUTO: 266 THOUSANDS/UL (ref 149–390)
PMV BLD AUTO: 11.1 FL (ref 8.9–12.7)
POTASSIUM SERPL-SCNC: 3.6 MMOL/L (ref 3.5–5.3)
PROT SERPL-MCNC: 6.9 G/DL (ref 6.4–8.4)
RBC # BLD AUTO: 4 MILLION/UL (ref 3.81–5.12)
RETICS # AUTO: NORMAL 10*3/UL (ref 14097–95744)
RETICS # CALC: 1.35 % (ref 0.37–1.87)
SODIUM SERPL-SCNC: 139 MMOL/L (ref 135–147)
TRIGL SERPL-MCNC: 64 MG/DL
TSH SERPL DL<=0.05 MIU/L-ACNC: 2.66 UIU/ML (ref 0.45–4.5)
WBC # BLD AUTO: 3.73 THOUSAND/UL (ref 4.31–10.16)

## 2023-03-02 ENCOUNTER — OFFICE VISIT (OUTPATIENT)
Dept: FAMILY MEDICINE CLINIC | Facility: CLINIC | Age: 33
End: 2023-03-02

## 2023-03-02 VITALS
WEIGHT: 165.6 LBS | SYSTOLIC BLOOD PRESSURE: 98 MMHG | HEART RATE: 70 BPM | BODY MASS INDEX: 30.29 KG/M2 | DIASTOLIC BLOOD PRESSURE: 62 MMHG | RESPIRATION RATE: 16 BRPM | OXYGEN SATURATION: 100 % | TEMPERATURE: 98.2 F

## 2023-03-02 DIAGNOSIS — D50.9 IRON DEFICIENCY ANEMIA, UNSPECIFIED IRON DEFICIENCY ANEMIA TYPE: ICD-10-CM

## 2023-03-02 DIAGNOSIS — N92.4 EXCESSIVE BLEEDING IN PREMENOPAUSAL PERIOD: ICD-10-CM

## 2023-03-02 DIAGNOSIS — E55.9 VITAMIN D DEFICIENCY: ICD-10-CM

## 2023-03-02 DIAGNOSIS — R51.9 NONINTRACTABLE HEADACHE, UNSPECIFIED CHRONICITY PATTERN, UNSPECIFIED HEADACHE TYPE: Primary | ICD-10-CM

## 2023-03-02 DIAGNOSIS — K59.04 CHRONIC IDIOPATHIC CONSTIPATION: ICD-10-CM

## 2023-03-02 RX ORDER — ERGOCALCIFEROL 1.25 MG/1
50000 CAPSULE ORAL WEEKLY
Qty: 6 CAPSULE | Refills: 0 | Status: SHIPPED | OUTPATIENT
Start: 2023-03-02 | End: 2023-04-07

## 2023-03-02 RX ORDER — AMOXICILLIN 250 MG
1 CAPSULE ORAL DAILY
Qty: 90 TABLET | Refills: 0 | Status: SHIPPED | OUTPATIENT
Start: 2023-03-02

## 2023-03-02 RX ORDER — ERGOCALCIFEROL 1.25 MG/1
50000 CAPSULE ORAL WEEKLY
Qty: 4 CAPSULE | Refills: 1 | Status: SHIPPED | OUTPATIENT
Start: 2023-03-02 | End: 2023-03-02

## 2023-03-02 RX ORDER — FERROUS SULFATE 325(65) MG
1 TABLET ORAL
Qty: 90 TABLET | Refills: 1 | Status: SHIPPED | OUTPATIENT
Start: 2023-03-02

## 2023-03-02 NOTE — ASSESSMENT & PLAN NOTE
New headaches onset 2 months ago - now resolved   Could be associated with anemia and/or vit D deficiency  - finished 1 week of naproxen 500 mg BID, recommend switching it to PRN now that headaches have resolved   - recommend adequate hydration and nutrition throughout the day as well as stress management  - will hold off on other abortive and maintenance medication as symptoms have resolved, and also consider secondary to deficiencies as above  - f/u 1 month

## 2023-03-02 NOTE — PATIENT INSTRUCTIONS
Intrauterine Device   AMBULATORY CARE:   An IUD  is a type of birth control that is inserted into your uterus  It is a small, flexible piece of plastic with a string on the end  It is inserted and removed by your healthcare provider  IUDs prevent sperm from reaching or fertilizing an egg  IUDs also prevent a fertilized egg from attaching to the uterus and developing into a fetus  Common types of IUDs:  Your healthcare provider will recommend the type of IUD that is right for you  This is based on your age and if you have had a child  If you have not had a child, a smaller IUD will be used  A copper IUD  slowly releases a small amount of copper into your uterus  This IUD can remain in place for up to 10 years  A hormone-releasing IUD  slowly releases a small amount of progesterone into your uterus  Progesterone is a hormone that is made by your body to help control your periods  This IUD can remain in place for 3 to 5 years  Seek care immediately if:   You have severe pain or bleeding during your period  You have a fever and severe abdominal pain  Call your doctor or gynecologist if:   You think you are pregnant  The IUD has come out  You have bleeding from your vagina after you have sex, and it is not your period  You have pain during sex  You cannot feel the IUD string, the string feels longer, or you feel the plastic of the IUD itself  You have vaginal discharge that is green, yellow, or has a foul odor  You have questions or concerns about your condition or care  Advantages of an IUD:   An IUD is 98% to 99% effective in preventing pregnancy  The IUD can be removed by your healthcare provider if you decide to have a baby  You may be able to get pregnant as soon as the IUD is removed  An IUD protects you from pregnancy right after it is inserted  You do not have to stop sexual activity to insert it   You do not have to remember to take your birth control pill     Copper IUDs are safer for some women than oral birth control pills  Examples include women who smoke or have a history of blood clots  Hormone-releasing IUDs may decrease certain health problems  Examples include bleeding and cramping that happen with your monthly period  Disadvantages of an IUD:   There is a small chance that you could get pregnant  Sometimes the IUD cannot be removed after you get pregnant  This increases your risk of a miscarriage or an ectopic pregnancy  Ectopic pregnancy is when the fertilized egg starts to grow somewhere other than your uterus  An IUD does not protect you from sexually transmitted infections  You may have cramps during the first weeks after you get the IUD  A copper IUD may cause your monthly period to be heavier or more painful  This is more common within the first 3 months after you get the IUD  You may need to have your IUD removed if your bleeding or pain becomes severe  You may have spotting between periods  There is a small risk of an infection within the first 20 days after the IUD is placed  Infection can lead to pelvic inflammatory disease  This can cause infertility  Your uterus may tear when the IUD is inserted  The IUD may slip part or all of the way out of your uterus  Self-care:   NSAIDs , such as ibuprofen, help decrease swelling, pain, and fever  This medicine is available with or without a doctor's order  NSAIDs can cause stomach bleeding or kidney problems in certain people  If you take blood thinner medicine, always ask if NSAIDs are safe for you  Always read the medicine label and follow directions  Apply heat to relieve pain and cramping  Use a heating pad set on low  Apply heat to your lower abdomen for 20 minutes every hour, or as directed  Return to activities as directed  Your healthcare provider will tell you when it is okay to return to work, school, or other activities      Do not use a tampon or have sex until your provider says it is okay  Make sure your IUD is in place: An IUD has a string that is made of plastic thread  One to 2 inches of this string hangs into your vagina  You cannot see this string, and it should not cause problems when you have sex  Check your IUD string every 3 days for the first 3 months that you have your IUD  After that, check the string after each monthly period  Do the following to check the placement of your IUD:  Wash your hands with soap and warm water  Dry them with a clean towel  Bend your knees and squat low to the ground  Gently put your index finger inside your vagina  The cervix is at the top of the vagina and feels like the tip of your nose  Feel for the IUD string  Do not pull on the string  You should not be able to feel the firm plastic of the IUD itself  Wash your hands after you check your IUD string  For more information:   [facility] [address]  Phone: 2- 948 - [phone]  Web Address: https://[employer]  org    Follow up with your doctor as directed:  Write down your questions so you remember to ask them during your visits  © Copyright [employer] 2022 Information is for End User's use only and may not be sold, redistributed or otherwise used for commercial purposes  The above information is an  only  It is not intended as medical advice for individual conditions or treatments  Talk to your doctor, nurse or pharmacist before following any medical regimen to see if it is safe and effective for you  Anemia   AMBULATORY CARE:   Anemia  is a low number of red blood cells or a low amount of hemoglobin in your red blood cells  Hemoglobin is a protein that helps carry oxygen throughout your body  Red blood cells use iron to create hemoglobin  Anemia may develop if your body does not have enough iron   It may also develop if your body does not make enough red blood cells or they die faster than your body can make them  Common symptoms include the following:   Chest pain or a fast heartbeat    Lightheadedness, dizziness, or shortness of breath    Cold or pale skin    Tiredness, weakness, or confusion    Call your local emergency number (911 in the [address]), or have someone call if:   You lose consciousness  You have severe chest pain  Seek care immediately if:   You have dark or bloody bowel movements  Call your doctor if:   Your symptoms are worse, even after treatment  You have questions or concerns about your condition or care  Treatment for anemia  may include any of the following:  Iron or folic acid supplements  help increase your red blood cell and hemoglobin levels  Vitamin B12 injections  may help boost your red blood cell level and decrease your symptoms  Ask your healthcare provider how to inject B12  Prevent anemia:  Eat healthy foods rich in iron and vitamin C  Nuts, meat, dark leafy green vegetables, and beans are high in iron and protein  Vitamin C helps your body absorb iron  Foods rich in vitamin C include oranges and other citrus fruits  Ask your healthcare provider for a list of other foods that are high in iron or vitamin C  Ask if you need to be on a special diet  Follow up with your doctor as directed:  Write down your questions so you remember to ask them during your visits  © Copyright [facility] 2022 Information is for End User's use only and may not be sold, redistributed or otherwise used for commercial purposes  The above information is an  only  It is not intended as medical advice for individual conditions or treatments  Talk to your doctor, nurse or pharmacist before following any medical regimen to see if it is safe and effective for you

## 2023-03-02 NOTE — ASSESSMENT & PLAN NOTE
Briefly discussion options as pt has heavy periods and cramping along with anemia  Could consider hormonal IUD if no contraindications  If pt is not comfortable with IUD will discuss other options     Information provided today on AVS for pt to review and discuss at next visit

## 2023-03-02 NOTE — ASSESSMENT & PLAN NOTE
Hgb 7 8 on recent CBC improved from 7 1 in 2022   Continue ferrous sulfate 325 mg and senokot, pt reports running out    - discussed continuing iron supplementation and pt agreeable, refilled   Pt had hx of constipation, recommend keeping on stool softeners while taking iron  - likely related to very heavy menstrual cycles which pt has not discussed with a doctor previously, discussed utility of hormonal contraceptive in assisting with menorrhagia juan carlos mirena as pt also has bad cramping   - pt would like to research and discuss further at next visit  - f/u 1 month

## 2023-03-02 NOTE — ASSESSMENT & PLAN NOTE
Vitamin D 13 2 on last labwork and pt endorses fatigue x2 months  - will supplement vit D 12469PO weekly x6 weeks, encourage daily vitamin D supplement thereafter and/or increasing vitamin D exposure during the day including sunlight and dietary   - recommend rechecking vitamin D level after 6 weeks of supplement

## 2023-03-02 NOTE — PROGRESS NOTES
Name: Dao Garcia      : 1990      MRN: [id number]  Encounter Provider: Latrice Bolanos MD  Encounter Date: 3/2/2023   Encounter department: [address] [place] [address]     1  Nonintractable headache, unspecified chronicity pattern, unspecified headache type  Assessment & Plan:  New headaches onset 2 months ago - now resolved   Could be associated with anemia and/or vit D deficiency  - finished 1 week of naproxen 500 mg BID, recommend switching it to PRN now that headaches have resolved   - recommend adequate hydration and nutrition throughout the day as well as stress management  - will hold off on other abortive and maintenance medication as symptoms have resolved, and also consider secondary to deficiencies as above  - f/u 1 month      2  Vitamin D deficiency  Assessment & Plan:  Vitamin D 13 2 on last labwork and pt endorses fatigue x2 months  - will supplement vit D 43809CP weekly x6 weeks, encourage daily vitamin D supplement thereafter and/or increasing vitamin D exposure during the day including sunlight and dietary   - recommend rechecking vitamin D level after 6 weeks of supplement    Orders:  -     ergocalciferol (VITAMIN D2) 50,000 units; Take 1 capsule (50,000 Units total) by mouth once a week for 6 doses    3  Excessive bleeding in premenopausal period  Assessment & Plan:  Briefly discussion options as pt has heavy periods and cramping along with anemia  Could consider hormonal IUD if no contraindications  If pt is not comfortable with IUD will discuss other options  Information provided today on AVS for pt to review and discuss at next visit       4  Iron deficiency anemia, unspecified iron deficiency anemia type  Assessment & Plan:  Hgb 7 8 on recent CBC improved from 7 1 in    Continue ferrous sulfate 325 mg and senokot, pt reports running out    - discussed continuing iron supplementation and pt agreeable, refilled   Pt had hx of constipation, recommend keeping on stool softeners while taking iron  - likely related to very heavy menstrual cycles which pt has not discussed with a doctor previously, discussed utility of hormonal contraceptive in assisting with menorrhagia juan carlos mirena as pt also has bad cramping   - pt would like to research and discuss further at next visit  - f/u 1 month    Orders:  -     ferrous sulfate 325 (65 Fe) mg tablet; Take 1 tablet (325 mg total) by mouth daily with breakfast    5  Chronic idiopathic constipation  -     senna-docusate sodium (SENOKOT S) 8 6-50 mg per tablet; Take 1 tablet by mouth daily         Subjective      HPI   35 yo female presenting for f/u headaches  Pt reports her headaches have resolved  Took 3-4 days to get better  Took naproxen for 1 week and has one week left  Has not taken anything else  Is feeling much better  Got some labwork and wants to review  Continues with anemia  Has vitamin D deficiency  Does endorse fatigue ongoing for 2 months that is not changing  Pt reports very heavy menstrual periods that last a long time, and she has bad cramps  Has not been on contraceptive and never discussed this  Review of Systems   Constitutional: Positive for fatigue  Negative for chills and fever  HENT: Negative for ear pain and sore throat  Eyes: Negative for pain and visual disturbance  Respiratory: Negative for cough and shortness of breath  Cardiovascular: Negative for chest pain and palpitations  Gastrointestinal: Negative for abdominal pain and vomiting  Genitourinary: Positive for menstrual problem  Negative for dysuria and hematuria  Musculoskeletal: Negative for arthralgias and back pain  Skin: Negative for color change and rash  Neurological: Negative for dizziness, seizures, syncope, light-headedness and headaches  Psychiatric/Behavioral: Negative for agitation and confusion  All other systems reviewed and are negative        Current Outpatient Medications on File Prior to Visit   Medication Sig   • acetaminophen (TYLENOL) 325 mg tablet Take 2 tablets (650 mg total) by mouth every 6 (six) hours as needed for mild pain   • albuterol (PROVENTIL HFA,VENTOLIN HFA) 90 mcg/act inhaler Inhale 2 puffs every 6 (six) hours as needed for shortness of breath   • topiramate (TOPAMAX) 25 mg tablet    • [DISCONTINUED] ferrous sulfate 325 (65 Fe) mg tablet TAKE 1 TABLET BY MOUTH EVERY DAY WITH BREAKFAST   • [DISCONTINUED] senna-docusate sodium (SENOKOT S) 8 6-50 mg per tablet Take 1 tablet by mouth daily   • clotrimazole-betamethasone (LOTRISONE) 1-0 05 % cream Apply topically 2 (two) times a day (Patient not taking: Reported on 3/2/2023)   • naproxen (Naprosyn) 500 mg tablet Take 1 tablet (500 mg total) by mouth 2 (two) times a day with meals for 14 days       Objective     BP 98/62   Pulse 70   Temp 98 2 °F (36 8 °C)   Resp 16   Wt 75 1 kg (165 lb 9 6 oz)   LMP  (LMP Unknown)   SpO2 100%   BMI 30 29 kg/m²     Physical Exam  Vitals reviewed  Constitutional:       General: She is not in acute distress  Appearance: She is well-developed  HENT:      Head: Normocephalic and atraumatic  Eyes:      Extraocular Movements: Extraocular movements intact  Conjunctiva/sclera: Conjunctivae normal    Cardiovascular:      Rate and Rhythm: Normal rate and regular rhythm  Heart sounds: Normal heart sounds  Pulmonary:      Effort: Pulmonary effort is normal  No respiratory distress  Breath sounds: Normal breath sounds  Abdominal:      Palpations: Abdomen is soft  Musculoskeletal:         General: No tenderness or deformity  Normal range of motion  Cervical back: Neck supple  Skin:     General: Skin is warm and dry  Findings: No rash  Neurological:      General: No focal deficit present  Mental Status: She is alert  Mental status is at baseline     Psychiatric:         Mood and Affect: Mood normal          Behavior: Behavior normal  Tristan Fofana MD

## 2023-04-14 RX ORDER — PRAZOSIN HYDROCHLORIDE 2 MG/1
2 CAPSULE ORAL
COMMUNITY
Start: 2023-02-07

## 2023-04-14 RX ORDER — HYDROXYZINE PAMOATE 50 MG/1
50 CAPSULE ORAL
COMMUNITY
Start: 2023-02-07

## 2023-04-14 RX ORDER — CLONIDINE HYDROCHLORIDE 0.1 MG/1
0.1 TABLET ORAL 2 TIMES DAILY
COMMUNITY
Start: 2023-03-08

## 2023-05-24 DIAGNOSIS — D50.9 IRON DEFICIENCY ANEMIA, UNSPECIFIED IRON DEFICIENCY ANEMIA TYPE: ICD-10-CM

## 2023-05-25 ENCOUNTER — OFFICE VISIT (OUTPATIENT)
Dept: OBGYN CLINIC | Facility: CLINIC | Age: 33
End: 2023-05-25

## 2023-05-25 VITALS
SYSTOLIC BLOOD PRESSURE: 102 MMHG | HEIGHT: 62 IN | DIASTOLIC BLOOD PRESSURE: 88 MMHG | WEIGHT: 168 LBS | BODY MASS INDEX: 30.91 KG/M2

## 2023-05-25 DIAGNOSIS — N92.0 MENORRHAGIA WITH REGULAR CYCLE: Primary | ICD-10-CM

## 2023-05-25 DIAGNOSIS — N76.0 ACUTE VAGINITIS: ICD-10-CM

## 2023-05-25 RX ORDER — METRONIDAZOLE 7.5 MG/G
1 GEL VAGINAL
Qty: 70 G | Refills: 0 | Status: SHIPPED | OUTPATIENT
Start: 2023-05-25 | End: 2023-05-30

## 2023-05-25 RX ORDER — FERROUS SULFATE 325(65) MG
TABLET ORAL
Qty: 90 TABLET | Refills: 2 | Status: SHIPPED | OUTPATIENT
Start: 2023-05-25

## 2023-05-25 RX ORDER — TRANEXAMIC ACID 650 MG/1
1300 TABLET ORAL 3 TIMES DAILY
Qty: 30 TABLET | Refills: 1 | Status: SHIPPED | OUTPATIENT
Start: 2023-05-25 | End: 2023-05-30

## 2023-05-25 NOTE — PROGRESS NOTES
Assessment/Plan:  Pap done as well as annual   Encouraged self breast examination as well as calcium supplementation  Reviewed labs consistent with profound anemia  Reviewed menstrual diary  Discussed treatment options to control menstrual flow including medications (hormonal versus nonhormonal   Risks and benefits reviewed  At this point she will try lysteda with her next menstrual cycle  Recommend pelvic ultrasound  She is scheduled for follow-up with her PCP regarding follow-up labs  She will address iron deficiency anemia  Briefly discussed iron infusions, as she is not tolerating oral iron  Also discuss bacterial vaginosis, recommend MetroGel nightly x5 days  She will return to office in 4 weeks for follow-up  No problem-specific Assessment & Plan notes found for this encounter  Diagnoses and all orders for this visit:    Menorrhagia with regular cycle  -     Tranexamic Acid 650 MG TABS; Take 2 tablets (1,300 mg total) by mouth 3 (three) times a day for 5 days  -     US pelvis complete w transvaginal; Future  -     Liquid-based pap, screening    Acute vaginitis  -     metroNIDAZOLE (METROGEL) 0 75 % vaginal gel; Insert 1 application  into the vagina daily at bedtime for 5 days          Subjective:      Patient ID: Emani Loco is a 34 y o  female  HPI     This is a very pleasant 76-year-old female  ( x4 with tubal ligation, age 12, 14, 14, 5 presents for annual GYN exam   However she has numerous complaints today  She states her menstrual cycles are regular every 4 weeks lasting 7 days described as very heavy for 3 days changing pad every 1 hour  She complains of increasing fatigue  She states she has a long history of anemia  She is also had a history of laparoscopic sleeve  Her weight has remained stable  She also complains of vaginal discharge, irritation  She has been  for over 10 years  Method of contraception is tubal ligation    She states that she "is has not been sexually active since her gastric surgery as this has resulted in pelvic pain  There are no changes in bowel or bladder function  Patient is a non-smoker  3/2023 Hb 7 8    History of kidney stone, 2022 laparoscopic cholecystectomy  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 sleeve   x4 with tubal ligation    The following portions of the patient's history were reviewed and updated as appropriate: allergies, current medications, past family history, past medical history, past social history, past surgical history and problem list     Review of Systems   Constitutional: Negative for fatigue, fever and unexpected weight change  Respiratory: Negative for cough, chest tightness, shortness of breath and wheezing  Cardiovascular: Negative  Negative for chest pain and palpitations  Gastrointestinal: Negative  Negative for abdominal distention, abdominal pain, blood in stool, constipation, diarrhea, nausea and vomiting  Genitourinary: Negative  Negative for difficulty urinating, dyspareunia, dysuria, flank pain, frequency, genital sores, hematuria, pelvic pain, urgency, vaginal bleeding, vaginal discharge and vaginal pain  Skin: Negative for rash  Objective:      /88   Ht 5' 2\" (1 575 m)   Wt 76 2 kg (168 lb)   LMP 2023 (Approximate)   BMI 30 73 kg/m²          Physical Exam  Constitutional:       Appearance: Normal appearance  She is well-developed  Cardiovascular:      Rate and Rhythm: Normal rate and regular rhythm  Pulmonary:      Effort: Pulmonary effort is normal       Breath sounds: Normal breath sounds  Chest:   Breasts:     Right: No inverted nipple, mass, nipple discharge, skin change or tenderness  Left: No inverted nipple, mass, nipple discharge, skin change or tenderness  Abdominal:      General: Bowel sounds are normal  There is no distension  Palpations: Abdomen is soft  Tenderness: There is no abdominal tenderness   There " is no guarding or rebound  Genitourinary:     Labia:         Right: No rash, tenderness or lesion  Left: No rash, tenderness or lesion  Vagina: Normal  No signs of injury  No vaginal discharge or tenderness  Cervix: No cervical motion tenderness, discharge, friability, lesion, erythema or cervical bleeding  Uterus: Not enlarged and not tender  Adnexa:         Right: No mass, tenderness or fullness  Left: No mass, tenderness or fullness  Neurological:      Mental Status: She is alert and oriented to person, place, and time     Psychiatric:         Behavior: Behavior normal

## 2023-05-27 LAB
HPV HR 12 DNA CVX QL NAA+PROBE: NEGATIVE
HPV16 DNA CVX QL NAA+PROBE: NEGATIVE
HPV18 DNA CVX QL NAA+PROBE: NEGATIVE

## 2023-06-05 LAB
LAB AP GYN PRIMARY INTERPRETATION: NORMAL
Lab: NORMAL

## 2023-06-13 ENCOUNTER — OFFICE VISIT (OUTPATIENT)
Dept: FAMILY MEDICINE CLINIC | Facility: CLINIC | Age: 33
End: 2023-06-13

## 2023-06-13 VITALS
DIASTOLIC BLOOD PRESSURE: 63 MMHG | WEIGHT: 172.8 LBS | HEIGHT: 62 IN | OXYGEN SATURATION: 100 % | HEART RATE: 62 BPM | TEMPERATURE: 97.6 F | SYSTOLIC BLOOD PRESSURE: 99 MMHG | BODY MASS INDEX: 31.8 KG/M2

## 2023-06-13 DIAGNOSIS — D50.9 IRON DEFICIENCY ANEMIA, UNSPECIFIED IRON DEFICIENCY ANEMIA TYPE: Primary | ICD-10-CM

## 2023-06-13 DIAGNOSIS — R53.83 OTHER FATIGUE: ICD-10-CM

## 2023-06-13 DIAGNOSIS — N20.1 URETEROLITHIASIS: ICD-10-CM

## 2023-06-13 DIAGNOSIS — E55.9 VITAMIN D DEFICIENCY: ICD-10-CM

## 2023-06-13 PROCEDURE — 99213 OFFICE O/P EST LOW 20 MIN: CPT | Performed by: FAMILY MEDICINE

## 2023-06-13 NOTE — ASSESSMENT & PLAN NOTE
Hgb 7 8 on recent CBC improved from 7 1 in 2022   Continue ferrous sulfate 325 mg and senokot, pt has side effect of constipation  - will recheck CBC and iron panel at this time   If continues to be low will arrange for IV iron infusions due to intolerance to oral iron supplementation   - likely related to very heavy menstrual cycles which pt is discussing with ob gyn

## 2023-06-13 NOTE — PROGRESS NOTES
"Name: Chaparrita Franz      : 1990      MRN: [id number]  Encounter Provider: Caroline Pérez MD  Encounter Date: 2023   Encounter department: [address] [place] [address]     1  Iron deficiency anemia, unspecified iron deficiency anemia type  Assessment & Plan:  Hgb 7 8 on recent CBC improved from 7 1 in    Continue ferrous sulfate 325 mg and senokot, pt has side effect of constipation  - will recheck CBC and iron panel at this time  If continues to be low will arrange for IV iron infusions due to intolerance to oral iron supplementation   - likely related to very heavy menstrual cycles which pt is discussing with ob gyn     Orders:  -     CBC and differential; Future  -     Iron Panel (Includes Ferritin, Iron Sat%, Iron, and TIBC); Future    2  Vitamin D deficiency  Assessment & Plan:  Vitamin D 13 2 on last labwork and pt endorses fatigue x3 months  - s/p supplement vit D [id number] weekly x6 weeks  - encourage daily vitamin D supplement and increasing vitamin D exposure during the day including sunlight and dietary   - will recheck vitamin D levels at this time to determine need for further treatment    Orders:  -     Vitamin D 25 hydroxy; Future    3  Other fatigue  -     CBC and differential; Future  -     Iron Panel (Includes Ferritin, Iron Sat%, Iron, and TIBC); Future  -     Vitamin D 25 hydroxy; Future    4  Ureterolithiasis  Assessment & Plan:  ED visits in November for flank pain and pressure when urinating  Found to have kidney stones  CT renal stone study 11/15 \"RIGHT KIDNEY AND URETER:  3 mm distal ureteral calculus at the vesicoureteral junction (series 601, image 80)  Mild hydroureteronephrosis  Multiple nonobstructing renal calculi measuring up to 3 mm  LEFT KIDNEY AND URETER: There are nonobstructing intrarenal calculi measuring on the order of 5 mm  No hydronephrosis or hydroureter  \"   CT :   1  Bilateral nonobstructing renal calculi   " 2   Calcification in the right pelvis is unchanged, uncertain whether this is in the right ureter  3   Status post cholecystectomy with mild intrahepatic and extrahepatic biliary ductal dilatation which is frequently a normal finding postcholecystectomy  Unclear if this is cause for flank pain however had previously reported much improved  Now just gets some flank pain here and there  Never followed up with urology in December, missed appointment and did not reschedule   - continue to encourage adequate hydration, supportive care, tylenol PRN  - completed flomax course    - pt has not observed any passage of stone in urine   - missed urology appt 12/19  Pt will call urology office to reschedule, number provided             Subjective      HPI   33 yo female presenting for f/u vitamin D deficiency and anemia  Reports headaches are better because medications are helping  Still feels tired  Told by ob gyn that blood count is still low  Was told to consider iron infusion  Pt reports side effect constipation on iron tablets  Senokot does help but pt still reports 1-2 BM's in a week and uncomfortable  Tries to eat fiber  Continues with menstrual problem  Was started on medication by ob gyn  Pt at this time does not want hormonal options but they were discussed  Pt reports last night had some flank pain she thought from kidney stones  Never saw urology, did not remember to call them to reschedule and does not have their number  Review of Systems   Constitutional: Positive for fatigue  Negative for chills and fever  HENT: Negative for ear pain and sore throat  Eyes: Negative for pain and visual disturbance  Respiratory: Negative for cough and shortness of breath  Cardiovascular: Negative for chest pain and palpitations  Gastrointestinal: Negative for abdominal pain and vomiting  Genitourinary: Positive for flank pain and menstrual problem  Negative for dysuria and hematuria  "  Musculoskeletal: Negative for arthralgias and back pain  Skin: Negative for color change and rash  Neurological: Positive for headaches  Negative for seizures and syncope  All other systems reviewed and are negative  Current Outpatient Medications on File Prior to Visit   Medication Sig   • acetaminophen (TYLENOL) 325 mg tablet Take 2 tablets (650 mg total) by mouth every 6 (six) hours as needed for mild pain   • albuterol (PROVENTIL HFA,VENTOLIN HFA) 90 mcg/act inhaler Inhale 2 puffs every 6 (six) hours as needed for shortness of breath   • cloNIDine (CATAPRES) 0 1 mg tablet Take 0 1 mg by mouth 2 (two) times a day   • clotrimazole-betamethasone (LOTRISONE) 1-0 05 % cream Apply topically 2 (two) times a day   • ferrous sulfate 325 (65 Fe) mg tablet TAKE 1 TABLET BY MOUTH EVERY DAY WITH BREAKFAST   • hydrOXYzine pamoate (VISTARIL) 50 mg capsule Take 50 mg by mouth daily at bedtime   • naproxen (Naprosyn) 500 mg tablet Take 1 tablet (500 mg total) by mouth 2 (two) times a day with meals for 14 days   • prazosin (MINIPRESS) 2 mg capsule Take 2 mg by mouth daily at bedtime   • senna-docusate sodium (SENOKOT S) 8 6-50 mg per tablet Take 1 tablet by mouth daily   • sertraline (ZOLOFT) 50 mg tablet Take 50 mg by mouth every morning   • topiramate (TOPAMAX) 25 mg tablet    • [DISCONTINUED] ergocalciferol (VITAMIN D2) 50,000 units Take 1 capsule (50,000 Units total) by mouth once a week for 6 doses (Patient not taking: Reported on 6/13/2023)       Objective     BP 99/63 (BP Location: Left arm, Patient Position: Sitting, Cuff Size: Standard)   Pulse 62   Temp 97 6 °F (36 4 °C) (Temporal)   Ht 5' 2\" (1 575 m)   Wt 78 4 kg (172 lb 12 8 oz)   LMP 05/05/2023 (Approximate)   SpO2 100%   BMI 31 61 kg/m²     Physical Exam  Vitals and nursing note reviewed  Constitutional:       General: She is not in acute distress  Appearance: She is well-developed  HENT:      Head: Normocephalic and atraumatic     Eyes: " Extraocular Movements: Extraocular movements intact  Conjunctiva/sclera: Conjunctivae normal    Cardiovascular:      Rate and Rhythm: Normal rate and regular rhythm  Pulmonary:      Effort: Pulmonary effort is normal  No respiratory distress  Breath sounds: Normal breath sounds  Abdominal:      Palpations: Abdomen is soft  Musculoskeletal:         General: No tenderness or deformity  Normal range of motion  Skin:     General: Skin is warm and dry  Findings: No rash  Neurological:      General: No focal deficit present  Mental Status: She is alert  Mental status is at baseline     Psychiatric:         Mood and Affect: Mood normal          Behavior: Behavior normal           Devin Glasgow MD

## 2023-06-13 NOTE — PATIENT INSTRUCTIONS
Anemia   AMBULATORY CARE:   Anemia  is a low number of red blood cells or a low amount of hemoglobin in your red blood cells  Hemoglobin is a protein that helps carry oxygen throughout your body  Red blood cells use iron to create hemoglobin  Anemia may develop if your body does not have enough iron  It may also develop if your body does not make enough red blood cells or they die faster than your body can make them  Common symptoms include the following:   Chest pain or a fast heartbeat    Lightheadedness, dizziness, or shortness of breath    Cold or pale skin    Tiredness, weakness, or confusion    Call your local emergency number (911 in the [address]), or have someone call if:   You lose consciousness  You have severe chest pain  Seek care immediately if:   You have dark or bloody bowel movements  Call your doctor if:   Your symptoms are worse, even after treatment  You have questions or concerns about your condition or care  Treatment for anemia  may include any of the following:  Iron or folic acid supplements  help increase your red blood cell and hemoglobin levels  Vitamin B12 injections  may help boost your red blood cell level and decrease your symptoms  Ask your healthcare provider how to inject B12  Prevent anemia:  Eat healthy foods rich in iron and vitamin C  Nuts, meat, dark leafy green vegetables, and beans are high in iron and protein  Vitamin C helps your body absorb iron  Foods rich in vitamin C include oranges and other citrus fruits  Ask your healthcare provider for a list of other foods that are high in iron or vitamin C  Ask if you need to be on a special diet  Follow up with your doctor as directed:  Write down your questions so you remember to ask them during your visits  © Copyright [employer] 2022 Information is for End User's use only and may not be sold, redistributed or otherwise used for commercial purposes  The above information is an  only  It is not intended as medical advice for individual conditions or treatments  Talk to your doctor, nurse or pharmacist before following any medical regimen to see if it is safe and effective for you

## 2023-06-13 NOTE — ASSESSMENT & PLAN NOTE
"ED visits in November for flank pain and pressure when urinating  Found to have kidney stones  CT renal stone study 11/15 \"RIGHT KIDNEY AND URETER:  3 mm distal ureteral calculus at the vesicoureteral junction (series 601, image 80)  Mild hydroureteronephrosis  Multiple nonobstructing renal calculi measuring up to 3 mm  LEFT KIDNEY AND URETER: There are nonobstructing intrarenal calculi measuring on the order of 5 mm  No hydronephrosis or hydroureter  \"   CT 11/22:   1  Bilateral nonobstructing renal calculi  2   Calcification in the right pelvis is unchanged, uncertain whether this is in the right ureter  3   Status post cholecystectomy with mild intrahepatic and extrahepatic biliary ductal dilatation which is frequently a normal finding postcholecystectomy  Unclear if this is cause for flank pain however had previously reported much improved  Now just gets some flank pain here and there  Never followed up with urology in December, missed appointment and did not reschedule   - continue to encourage adequate hydration, supportive care, tylenol PRN  - completed flomax course    - pt has not observed any passage of stone in urine   - missed urology appt 12/19   Pt will call urology office to reschedule, number provided   "

## 2023-06-13 NOTE — ASSESSMENT & PLAN NOTE
Vitamin D 13 2 on last labwork and pt endorses fatigue x3 months  - s/p supplement vit D [id number] weekly x6 weeks  - encourage daily vitamin D supplement and increasing vitamin D exposure during the day including sunlight and dietary   - will recheck vitamin D levels at this time to determine need for further treatment

## 2023-06-29 ENCOUNTER — HOSPITAL ENCOUNTER (OUTPATIENT)
Dept: RADIOLOGY | Facility: HOSPITAL | Age: 33
Discharge: HOME | End: 2023-06-29
Attending: OBSTETRICS & GYNECOLOGY
Payer: COMMERCIAL

## 2023-06-29 DIAGNOSIS — N92.0 MENORRHAGIA WITH REGULAR CYCLE: ICD-10-CM

## 2023-06-29 PROCEDURE — 76830 TRANSVAGINAL US NON-OB: CPT

## 2023-06-29 PROCEDURE — 76856 US EXAM PELVIC COMPLETE: CPT

## 2023-07-11 ENCOUNTER — OFFICE VISIT (OUTPATIENT)
Dept: OBGYN CLINIC | Facility: CLINIC | Age: 33
End: 2023-07-11
Payer: COMMERCIAL

## 2023-07-11 VITALS
HEIGHT: 62 IN | DIASTOLIC BLOOD PRESSURE: 60 MMHG | BODY MASS INDEX: 31.58 KG/M2 | WEIGHT: 171.6 LBS | SYSTOLIC BLOOD PRESSURE: 102 MMHG

## 2023-07-11 DIAGNOSIS — D50.0 IRON DEFICIENCY ANEMIA DUE TO CHRONIC BLOOD LOSS: ICD-10-CM

## 2023-07-11 DIAGNOSIS — N92.0 MENORRHAGIA WITH REGULAR CYCLE: Primary | ICD-10-CM

## 2023-07-11 DIAGNOSIS — N89.8 VAGINAL DISCHARGE: ICD-10-CM

## 2023-07-11 PROCEDURE — 99213 OFFICE O/P EST LOW 20 MIN: CPT | Performed by: OBSTETRICS & GYNECOLOGY

## 2023-07-11 RX ORDER — TRANEXAMIC ACID 650 MG/1
1300 TABLET ORAL 3 TIMES DAILY
Qty: 30 TABLET | Refills: 4 | Status: SHIPPED | OUTPATIENT
Start: 2023-07-11 | End: 2023-07-16

## 2023-07-11 NOTE — PROGRESS NOTES
Assessment/Plan:  Cultures obtained for bacterial vaginosis, candidiasis. Reviewed menstrual diary. Marked improvement using lysteda with her last 2 menstrual cycles. She is scheduled for more lab work this week. Overall she feels a little better. We discussed treatment options for cycle control including OCPs/IUD/Depo risks and benefits reviewed. ,She will continue lysteda. Discussed use of boric acid prophylactically. Await above cultures. I will notify her of the above results via telephone. No problem-specific Assessment & Plan notes found for this encounter. Diagnoses and all orders for this visit:    Menorrhagia with regular cycle  -     Tranexamic Acid 650 MG TABS; Take 2 tablets (1,300 mg total) by mouth 3 (three) times a day for 5 days    Iron deficiency anemia due to chronic blood loss  -     Tranexamic Acid 650 MG TABS; Take 2 tablets (1,300 mg total) by mouth 3 (three) times a day for 5 days    Vaginal discharge          Subjective:      Patient ID: Osman Charlton is a 34 y.o. female. HPI     This is a pleasant 50-year-old female P4 ( x4 with tubal ligation) presents for follow-up. Her menstrual cycles were every 4 weeks lasting 7 days described as very heavy for 3 days changing a pad every 1 hour. She has used lysteda during her last 2 menstrual cycles with marked improvement where her flow has decreased and duration was 5 days. She responded well to MetroGel last month. She states her discharge then increased where she is using a panty liner and was very irritated.         Patient is a non-smoker.     3/2023 Hb 7.8     History of kidney stone, 2022 laparoscopic cholecystectomy  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 sleeve   x4 with tubal ligation    6/4 x 4-5 d     x 4-5 days    The following portions of the patient's history were reviewed and updated as appropriate: allergies, current medications, past family history, past medical history, past social history, past surgical history and problem list.    Review of Systems   Constitutional: Negative for fatigue, fever and unexpected weight change. Respiratory: Negative for cough, chest tightness, shortness of breath and wheezing. Cardiovascular: Negative. Negative for chest pain and palpitations. Gastrointestinal: Negative. Negative for abdominal distention, abdominal pain, blood in stool, constipation, diarrhea, nausea and vomiting. Genitourinary: Negative. Negative for difficulty urinating, dyspareunia, dysuria, flank pain, frequency, genital sores, hematuria, pelvic pain, urgency, vaginal bleeding, vaginal discharge and vaginal pain. Skin: Negative for rash. Objective:      /60   Ht 5' 2" (1.575 m)   Wt 77.8 kg (171 lb 9.6 oz)   LMP 07/05/2023 (Approximate)   BMI 31.39 kg/m²          Physical Exam  Constitutional:       Appearance: Normal appearance. Abdominal:      General: Bowel sounds are normal. There is no distension. Palpations: Abdomen is soft. Tenderness: There is no abdominal tenderness. There is no guarding or rebound. Genitourinary:     Labia:         Right: No rash, tenderness or lesion. Left: No rash, tenderness or lesion. Vagina: No signs of injury. Vaginal discharge present. No tenderness. Cervix: No cervical motion tenderness, discharge, friability, lesion, erythema or cervical bleeding. Uterus: Not enlarged. Adnexa:         Right: No mass, tenderness or fullness. Left: No mass, tenderness or fullness. Neurological:      Mental Status: She is alert and oriented to person, place, and time. Psychiatric:         Behavior: Behavior normal.       The vagina is well estrogenized. She does have a watery discharge.   Vaginal pH is normal.

## 2023-07-13 LAB
BV BACTERIA RRNA VAG QL NAA+PROBE: POSITIVE
C GLABRATA RNA VAG QL NAA+PROBE: NOT DETECTED
C TRACH RRNA SPEC QL NAA+PROBE: NOT DETECTED
CANDIDA RRNA VAG QL PROBE: DETECTED
N GONORRHOEA RRNA SPEC QL NAA+PROBE: NOT DETECTED
T VAGINALIS RRNA SPEC QL NAA+PROBE: NOT DETECTED

## 2023-07-14 ENCOUNTER — TELEPHONE (OUTPATIENT)
Dept: OBGYN CLINIC | Facility: CLINIC | Age: 33
End: 2023-07-14

## 2023-07-14 NOTE — TELEPHONE ENCOUNTER
Patient's insurance denied coverage for Lysteda (non-formulary) - requirement is 2 of following drugs to meet criteria - progesterone, medroxyprogesterone, norethindrone. patient states has taken Lysteda for 2 months for menorrhagia, secondary profound anemia, has not been on any other treatment for menorrhagia. Informed can continue with [facility] saving card ($24-$39). Do you recommend any other treatment options? Also (+) yeast & (+) BV on culture results 7/11/2023 not reviewed by you as of yet. Called [facility] ([address]) - pharmacist states patient picked up prescription for same x 1 month 5/2023 only & insur covered x 5 days then required prior auth.

## 2023-07-17 ENCOUNTER — TELEPHONE (OUTPATIENT)
Dept: OBGYN CLINIC | Facility: CLINIC | Age: 33
End: 2023-07-17

## 2023-07-17 DIAGNOSIS — N92.0 MENORRHAGIA WITH REGULAR CYCLE: ICD-10-CM

## 2023-07-17 DIAGNOSIS — N76.0 BV (BACTERIAL VAGINOSIS): ICD-10-CM

## 2023-07-17 DIAGNOSIS — B96.89 BV (BACTERIAL VAGINOSIS): ICD-10-CM

## 2023-07-17 DIAGNOSIS — B37.31 YEAST VAGINITIS: Primary | ICD-10-CM

## 2023-07-17 RX ORDER — METRONIDAZOLE 500 MG/1
500 TABLET ORAL EVERY 12 HOURS SCHEDULED
Qty: 14 TABLET | Refills: 0 | Status: SHIPPED | OUTPATIENT
Start: 2023-07-17 | End: 2023-07-24

## 2023-07-17 RX ORDER — FLUCONAZOLE 150 MG/1
150 TABLET ORAL ONCE
Qty: 1 TABLET | Refills: 0 | Status: SHIPPED | OUTPATIENT
Start: 2023-07-17 | End: 2023-07-17

## 2023-07-17 RX ORDER — MEDROXYPROGESTERONE ACETATE 150 MG/ML
150 INJECTION, SUSPENSION INTRAMUSCULAR
Qty: 1 ML | Refills: 1 | Status: SHIPPED | OUTPATIENT
Start: 2023-07-17

## 2023-07-17 NOTE — TELEPHONE ENCOUNTER
Patient will start Depo Provera inj - will call with onset next menses. Please sign off on Depo Provera to [facility] ([address]

## 2023-07-17 NOTE — TELEPHONE ENCOUNTER
Patient informed of culture results & recommended tx/KA. Please sign off on prescription for Diflucan & Flagyl to [facility] ([address]). Patient will recall office to follow up re: boric acid tx after completing Flagyl.

## 2023-07-17 NOTE — TELEPHONE ENCOUNTER
----- Message from Jenna Cintron DO sent at 7/16/2023  6:53 PM EDT -----  [facility], Please call the patient regarding her abnormal result. Inform +BV and yeast, rec flagyl PO x 7 days and diflucan x 1 dose. I would then use boric acid x 1 wk after menses for next 3  consecutive months. We discussed this o her last visit.

## 2023-07-26 ENCOUNTER — CLINICAL SUPPORT (OUTPATIENT)
Dept: OBGYN CLINIC | Facility: CLINIC | Age: 33
End: 2023-07-26
Payer: COMMERCIAL

## 2023-07-26 VITALS — BODY MASS INDEX: 32.3 KG/M2 | HEIGHT: 62 IN | WEIGHT: 175.5 LBS

## 2023-07-26 DIAGNOSIS — N92.0 MENORRHAGIA WITH REGULAR CYCLE: Primary | ICD-10-CM

## 2023-07-26 PROCEDURE — 96372 THER/PROPH/DIAG INJ SC/IM: CPT | Performed by: NURSE PRACTITIONER

## 2023-07-26 RX ORDER — LUMATEPERONE 42 MG/1
CAPSULE ORAL
COMMUNITY
Start: 2023-07-12

## 2023-07-26 RX ORDER — MEDROXYPROGESTERONE ACETATE 150 MG/ML
150 INJECTION, SUSPENSION INTRAMUSCULAR
Status: SHIPPED | OUTPATIENT
Start: 2023-07-26

## 2023-07-26 RX ADMIN — MEDROXYPROGESTERONE ACETATE 150 MG: 150 INJECTION, SUSPENSION INTRAMUSCULAR at 01:45

## 2023-07-26 NOTE — PROGRESS NOTES
Patient's LMP 7/24/2023 - reviewed Depo Provera regimen, side effects, irreg bleeding pattern. Depo Provera 150 mg given IM (L) buttocks without difficulty (menorrhagia, patient had tubal ligation). LOT#  [id number]  EXP 9/2025  [address] [place] [address] [id number]. Patient will also start boric acid q month x 1 wk x 3 months. She will recall if wants prescription thru [address] [place] [address].

## 2023-10-18 ENCOUNTER — TELEPHONE (OUTPATIENT)
Dept: OBGYN CLINIC | Facility: CLINIC | Age: 33
End: 2023-10-18

## 2023-10-18 DIAGNOSIS — N92.0 MENORRHAGIA WITH REGULAR CYCLE: ICD-10-CM

## 2023-10-18 RX ORDER — MEDROXYPROGESTERONE ACETATE 150 MG/ML
150 INJECTION, SUSPENSION INTRAMUSCULAR
Qty: 1 ML | Refills: 1 | Status: SHIPPED | OUTPATIENT
Start: 2023-10-18

## 2023-10-23 ENCOUNTER — HOSPITAL ENCOUNTER (INPATIENT)
Facility: HOSPITAL | Age: 33
LOS: 1 days | Discharge: HOME | End: 2023-10-24
Attending: EMERGENCY MEDICINE | Admitting: FAMILY MEDICINE
Payer: COMMERCIAL

## 2023-10-23 ENCOUNTER — APPOINTMENT (EMERGENCY)
Dept: RADIOLOGY | Facility: HOSPITAL | Age: 33
End: 2023-10-23
Payer: COMMERCIAL

## 2023-10-23 DIAGNOSIS — N20.0 KIDNEY STONE: Primary | ICD-10-CM

## 2023-10-23 DIAGNOSIS — R52 INTRACTABLE PAIN: ICD-10-CM

## 2023-10-23 PROBLEM — F31.9 BIPOLAR DEPRESSION (HCC): Status: ACTIVE | Noted: 2023-10-23

## 2023-10-23 LAB
ALBUMIN SERPL BCP-MCNC: 4 G/DL (ref 3.5–5)
ALP SERPL-CCNC: 57 U/L (ref 34–104)
ALT SERPL W P-5'-P-CCNC: 8 U/L (ref 7–52)
ANION GAP SERPL CALCULATED.3IONS-SCNC: 7 MMOL/L
AST SERPL W P-5'-P-CCNC: 13 U/L (ref 13–39)
BACTERIA UR QL AUTO: ABNORMAL /HPF
BASOPHILS # BLD AUTO: 0.01 THOUSANDS/ÂΜL (ref 0–0.1)
BASOPHILS NFR BLD AUTO: 0 % (ref 0–1)
BILIRUB SERPL-MCNC: 0.36 MG/DL (ref 0.2–1)
BILIRUB UR QL STRIP: NEGATIVE
BUN SERPL-MCNC: 14 MG/DL (ref 5–25)
CALCIUM SERPL-MCNC: 8.6 MG/DL (ref 8.4–10.2)
CHLORIDE SERPL-SCNC: 108 MMOL/L (ref 96–108)
CLARITY UR: ABNORMAL
CO2 SERPL-SCNC: 22 MMOL/L (ref 21–32)
COLOR UR: YELLOW
CREAT SERPL-MCNC: 0.64 MG/DL (ref 0.6–1.3)
EOSINOPHIL # BLD AUTO: 0.06 THOUSAND/ÂΜL (ref 0–0.61)
EOSINOPHIL NFR BLD AUTO: 1 % (ref 0–6)
ERYTHROCYTE [DISTWIDTH] IN BLOOD BY AUTOMATED COUNT: 17.4 % (ref 11.6–15.1)
EXT PREGNANCY TEST URINE: NEGATIVE
EXT. CONTROL: NORMAL
GFR SERPL CREATININE-BSD FRML MDRD: 116 ML/MIN/1.73SQ M
GLUCOSE SERPL-MCNC: 94 MG/DL (ref 65–140)
GLUCOSE UR STRIP-MCNC: NEGATIVE MG/DL
HCT VFR BLD AUTO: 30.9 % (ref 34.8–46.1)
HGB BLD-MCNC: 8.9 G/DL (ref 11.5–15.4)
HGB UR QL STRIP.AUTO: NEGATIVE
IMM GRANULOCYTES # BLD AUTO: 0.02 THOUSAND/UL (ref 0–0.2)
IMM GRANULOCYTES NFR BLD AUTO: 0 % (ref 0–2)
KETONES UR STRIP-MCNC: ABNORMAL MG/DL
LEUKOCYTE ESTERASE UR QL STRIP: ABNORMAL
LYMPHOCYTES # BLD AUTO: 1.22 THOUSANDS/ÂΜL (ref 0.6–4.47)
LYMPHOCYTES NFR BLD AUTO: 19 % (ref 14–44)
MCH RBC QN AUTO: 21 PG (ref 26.8–34.3)
MCHC RBC AUTO-ENTMCNC: 28.8 G/DL (ref 31.4–37.4)
MCV RBC AUTO: 73 FL (ref 82–98)
MONOCYTES # BLD AUTO: 0.51 THOUSAND/ÂΜL (ref 0.17–1.22)
MONOCYTES NFR BLD AUTO: 8 % (ref 4–12)
MUCOUS THREADS UR QL AUTO: ABNORMAL
NEUTROPHILS # BLD AUTO: 4.53 THOUSANDS/ÂΜL (ref 1.85–7.62)
NEUTS SEG NFR BLD AUTO: 72 % (ref 43–75)
NITRITE UR QL STRIP: NEGATIVE
NON-SQ EPI CELLS URNS QL MICRO: ABNORMAL /HPF
NRBC BLD AUTO-RTO: 0 /100 WBCS
PH UR STRIP.AUTO: 7 [PH] (ref 4.5–8)
PLATELET # BLD AUTO: 315 THOUSANDS/UL (ref 149–390)
PMV BLD AUTO: 10.3 FL (ref 8.9–12.7)
POTASSIUM SERPL-SCNC: 4.1 MMOL/L (ref 3.5–5.3)
PROT SERPL-MCNC: 7.1 G/DL (ref 6.4–8.4)
PROT UR STRIP-MCNC: ABNORMAL MG/DL
RBC # BLD AUTO: 4.24 MILLION/UL (ref 3.81–5.12)
RBC #/AREA URNS AUTO: ABNORMAL /HPF
SODIUM SERPL-SCNC: 137 MMOL/L (ref 135–147)
SP GR UR STRIP.AUTO: 1.02 (ref 1–1.03)
UROBILINOGEN UR QL STRIP.AUTO: 1 E.U./DL
WBC # BLD AUTO: 6.35 THOUSAND/UL (ref 4.31–10.16)
WBC #/AREA URNS AUTO: ABNORMAL /HPF

## 2023-10-23 PROCEDURE — 81025 URINE PREGNANCY TEST: CPT

## 2023-10-23 PROCEDURE — G1004 CDSM NDSC: HCPCS

## 2023-10-23 PROCEDURE — 96376 TX/PRO/DX INJ SAME DRUG ADON: CPT

## 2023-10-23 PROCEDURE — 36415 COLL VENOUS BLD VENIPUNCTURE: CPT

## 2023-10-23 PROCEDURE — 99284 EMERGENCY DEPT VISIT MOD MDM: CPT

## 2023-10-23 PROCEDURE — 80053 COMPREHEN METABOLIC PANEL: CPT

## 2023-10-23 PROCEDURE — 96374 THER/PROPH/DIAG INJ IV PUSH: CPT

## 2023-10-23 PROCEDURE — 85025 COMPLETE CBC W/AUTO DIFF WBC: CPT

## 2023-10-23 PROCEDURE — 81001 URINALYSIS AUTO W/SCOPE: CPT

## 2023-10-23 PROCEDURE — 74176 CT ABD & PELVIS W/O CONTRAST: CPT

## 2023-10-23 PROCEDURE — 96375 TX/PRO/DX INJ NEW DRUG ADDON: CPT

## 2023-10-23 PROCEDURE — 99285 EMERGENCY DEPT VISIT HI MDM: CPT | Performed by: EMERGENCY MEDICINE

## 2023-10-23 RX ORDER — AMOXICILLIN 250 MG
1 CAPSULE ORAL DAILY
Status: DISCONTINUED | OUTPATIENT
Start: 2023-10-24 | End: 2023-10-24 | Stop reason: HOSPADM

## 2023-10-23 RX ORDER — METOCLOPRAMIDE HYDROCHLORIDE 5 MG/ML
10 INJECTION INTRAMUSCULAR; INTRAVENOUS ONCE
Status: COMPLETED | OUTPATIENT
Start: 2023-10-23 | End: 2023-10-23

## 2023-10-23 RX ORDER — CLONIDINE HYDROCHLORIDE 0.1 MG/1
0.1 TABLET ORAL 2 TIMES DAILY
Status: DISCONTINUED | OUTPATIENT
Start: 2023-10-24 | End: 2023-10-24 | Stop reason: HOSPADM

## 2023-10-23 RX ORDER — ALBUTEROL SULFATE 90 UG/1
2 AEROSOL, METERED RESPIRATORY (INHALATION) EVERY 6 HOURS PRN
Status: DISCONTINUED | OUTPATIENT
Start: 2023-10-23 | End: 2023-10-24 | Stop reason: HOSPADM

## 2023-10-23 RX ORDER — KETOROLAC TROMETHAMINE 30 MG/ML
15 INJECTION, SOLUTION INTRAMUSCULAR; INTRAVENOUS ONCE
Status: COMPLETED | OUTPATIENT
Start: 2023-10-23 | End: 2023-10-23

## 2023-10-23 RX ORDER — TOPIRAMATE 25 MG/1
25 TABLET ORAL DAILY
Status: DISCONTINUED | OUTPATIENT
Start: 2023-10-24 | End: 2023-10-24 | Stop reason: HOSPADM

## 2023-10-23 RX ORDER — SERTRALINE HYDROCHLORIDE 25 MG/1
50 TABLET, FILM COATED ORAL EVERY MORNING
Status: DISCONTINUED | OUTPATIENT
Start: 2023-10-24 | End: 2023-10-24 | Stop reason: HOSPADM

## 2023-10-23 RX ORDER — PRAZOSIN HYDROCHLORIDE 2 MG/1
2 CAPSULE ORAL
Status: DISCONTINUED | OUTPATIENT
Start: 2023-10-24 | End: 2023-10-24 | Stop reason: HOSPADM

## 2023-10-23 RX ORDER — FERROUS SULFATE 325(65) MG
325 TABLET ORAL
Status: DISCONTINUED | OUTPATIENT
Start: 2023-10-24 | End: 2023-10-24 | Stop reason: HOSPADM

## 2023-10-23 RX ORDER — KETOROLAC TROMETHAMINE 30 MG/ML
15 INJECTION, SOLUTION INTRAMUSCULAR; INTRAVENOUS EVERY 6 HOURS PRN
Status: DISCONTINUED | OUTPATIENT
Start: 2023-10-23 | End: 2023-10-23

## 2023-10-23 RX ORDER — KETOROLAC TROMETHAMINE 30 MG/ML
15 INJECTION, SOLUTION INTRAMUSCULAR; INTRAVENOUS EVERY 6 HOURS SCHEDULED
Status: DISCONTINUED | OUTPATIENT
Start: 2023-10-24 | End: 2023-10-24 | Stop reason: HOSPADM

## 2023-10-23 RX ORDER — ACETAMINOPHEN 325 MG/1
975 TABLET ORAL EVERY 8 HOURS PRN
Status: DISCONTINUED | OUTPATIENT
Start: 2023-10-23 | End: 2023-10-24

## 2023-10-23 RX ORDER — SODIUM CHLORIDE, SODIUM LACTATE, POTASSIUM CHLORIDE, CALCIUM CHLORIDE 600; 310; 30; 20 MG/100ML; MG/100ML; MG/100ML; MG/100ML
125 INJECTION, SOLUTION INTRAVENOUS CONTINUOUS
Status: DISCONTINUED | OUTPATIENT
Start: 2023-10-24 | End: 2023-10-23

## 2023-10-23 RX ORDER — HYDROMORPHONE HCL/PF 1 MG/ML
0.5 SYRINGE (ML) INJECTION ONCE
Status: COMPLETED | OUTPATIENT
Start: 2023-10-23 | End: 2023-10-23

## 2023-10-23 RX ORDER — HYDROXYZINE 50 MG/1
50 TABLET, FILM COATED ORAL
Status: DISCONTINUED | OUTPATIENT
Start: 2023-10-24 | End: 2023-10-24 | Stop reason: HOSPADM

## 2023-10-23 RX ORDER — ONDANSETRON 2 MG/ML
4 INJECTION INTRAMUSCULAR; INTRAVENOUS EVERY 6 HOURS PRN
Status: DISCONTINUED | OUTPATIENT
Start: 2023-10-23 | End: 2023-10-24 | Stop reason: HOSPADM

## 2023-10-23 RX ORDER — ACETAMINOPHEN 325 MG/1
975 TABLET ORAL EVERY 8 HOURS PRN
Status: DISCONTINUED | OUTPATIENT
Start: 2023-10-23 | End: 2023-10-23

## 2023-10-23 RX ORDER — SODIUM CHLORIDE, SODIUM LACTATE, POTASSIUM CHLORIDE, CALCIUM CHLORIDE 600; 310; 30; 20 MG/100ML; MG/100ML; MG/100ML; MG/100ML
125 INJECTION, SOLUTION INTRAVENOUS CONTINUOUS
Status: DISCONTINUED | OUTPATIENT
Start: 2023-10-24 | End: 2023-10-24 | Stop reason: HOSPADM

## 2023-10-23 RX ADMIN — METOCLOPRAMIDE 10 MG: 5 INJECTION, SOLUTION INTRAMUSCULAR; INTRAVENOUS at 20:20

## 2023-10-23 RX ADMIN — KETOROLAC TROMETHAMINE 15 MG: 30 INJECTION, SOLUTION INTRAMUSCULAR; INTRAVENOUS at 22:03

## 2023-10-23 RX ADMIN — CEFTRIAXONE SODIUM 1000 MG: 10 INJECTION, POWDER, FOR SOLUTION INTRAVENOUS at 23:29

## 2023-10-23 RX ADMIN — HYDROMORPHONE HYDROCHLORIDE 0.5 MG: 1 INJECTION, SOLUTION INTRAMUSCULAR; INTRAVENOUS; SUBCUTANEOUS at 23:29

## 2023-10-23 RX ADMIN — KETOROLAC TROMETHAMINE 15 MG: 30 INJECTION, SOLUTION INTRAMUSCULAR; INTRAVENOUS at 20:20

## 2023-10-24 ENCOUNTER — TRANSITIONAL CARE MANAGEMENT (OUTPATIENT)
Dept: FAMILY MEDICINE CLINIC | Facility: CLINIC | Age: 33
End: 2023-10-24

## 2023-10-24 VITALS
BODY MASS INDEX: 31.85 KG/M2 | RESPIRATION RATE: 14 BRPM | HEART RATE: 69 BPM | HEIGHT: 62 IN | TEMPERATURE: 98.6 F | WEIGHT: 173.06 LBS | SYSTOLIC BLOOD PRESSURE: 101 MMHG | OXYGEN SATURATION: 98 % | DIASTOLIC BLOOD PRESSURE: 59 MMHG

## 2023-10-24 PROCEDURE — 99223 1ST HOSP IP/OBS HIGH 75: CPT | Performed by: FAMILY MEDICINE

## 2023-10-24 PROCEDURE — NC001 PR NO CHARGE: Performed by: FAMILY MEDICINE

## 2023-10-24 RX ORDER — NAPROXEN 500 MG/1
500 TABLET ORAL 2 TIMES DAILY WITH MEALS
Qty: 14 TABLET | Refills: 0 | Status: SHIPPED | OUTPATIENT
Start: 2023-10-24 | End: 2023-10-31

## 2023-10-24 RX ORDER — TAMSULOSIN HYDROCHLORIDE 0.4 MG/1
0.4 CAPSULE ORAL
Qty: 14 CAPSULE | Refills: 0 | Status: SHIPPED | OUTPATIENT
Start: 2023-10-24 | End: 2023-11-07

## 2023-10-24 RX ORDER — ACETAMINOPHEN 325 MG/1
975 TABLET ORAL EVERY 8 HOURS SCHEDULED
Status: DISCONTINUED | OUTPATIENT
Start: 2023-10-24 | End: 2023-10-24 | Stop reason: HOSPADM

## 2023-10-24 RX ADMIN — FERROUS SULFATE TAB 325 MG (65 MG ELEMENTAL FE) 325 MG: 325 (65 FE) TAB at 09:02

## 2023-10-24 RX ADMIN — KETOROLAC TROMETHAMINE 15 MG: 30 INJECTION, SOLUTION INTRAMUSCULAR; INTRAVENOUS at 05:47

## 2023-10-24 RX ADMIN — ACETAMINOPHEN 975 MG: 325 TABLET, FILM COATED ORAL at 09:01

## 2023-10-24 RX ADMIN — HYDROXYZINE HYDROCHLORIDE 50 MG: 25 TABLET, FILM COATED ORAL at 00:47

## 2023-10-24 RX ADMIN — SODIUM CHLORIDE, SODIUM LACTATE, POTASSIUM CHLORIDE, AND CALCIUM CHLORIDE 1000 ML: .6; .31; .03; .02 INJECTION, SOLUTION INTRAVENOUS at 00:45

## 2023-10-24 RX ADMIN — SODIUM CHLORIDE, SODIUM LACTATE, POTASSIUM CHLORIDE, AND CALCIUM CHLORIDE 125 ML/HR: .6; .31; .03; .02 INJECTION, SOLUTION INTRAVENOUS at 03:37

## 2023-10-24 RX ADMIN — SERTRALINE 50 MG: 25 TABLET, FILM COATED ORAL at 09:01

## 2023-10-24 RX ADMIN — SENNOSIDES, DOCUSATE SODIUM 1 TABLET: 8.6; 5 TABLET ORAL at 09:02

## 2023-10-24 NOTE — DISCHARGE SUMMARY
DISCHARGE SUMMARY - Family Medicine Residency, Vivi Aranza 1990, 28 y.o. female. MRN: [id number]    Unit/Bed#: WT7 865-01 Encounter: [id number]  Primary Care Provider: Jarred Posada MD      Admission Date: [unfilled]  Discharge Date: 10/24/23  Length of Stay: 1 days  Diagnosis:   Principal Problem:    Ureterolithiasis  Active Problems:    Chronic idiopathic constipation    Iron deficiency anemia    Bipolar depression ([address])        ASSESSMENTS & PLANS:   Plans discussed with [facility] team and finalization is pending attending physician attestation. * Ureterolithiasis  Assessment & Plan  Presents to the ED with 4 hours of left flank pain  CT renal stone study on 10/23/2023 showed 4 mm calculus in the region of the left UVJ causing moderate hydroureter and mild hydronephrosis. . Multiple nonobstructing bilateral renal calculi. Pain persisted despite Toradol injections in the ED  10/24: Reported passed 2 small stones; pain is significantly improved s/p stone passages and IVF  Plan:  Discharge home with Naproxen for pain PRN  Flomax for 14 days   Consider repeat US to monitor hydronephrosis     Bipolar depression ([address])  Assessment & Plan  Continue Caplyta 42 mg nightly, continue hydroxyzine 50 mg nightly, continue sertraline 50 mg every morning.     Iron deficiency anemia  Assessment & Plan  Continue ferrous sulfate 325 mg daily    Chronic idiopathic constipation  Assessment & Plan  Continue Senokot S 8.6-50 mg daily        Patient Active Problem List   Diagnosis   • Choledocholithiasis   • Asthma   • RUQ abdominal pain   • Chronic idiopathic constipation   • Annual physical exam   • Ureterolithiasis   • Iron deficiency anemia   • Nonintractable headache   • Vitamin D deficiency   • Excessive bleeding in premenopausal period   • Asthma, mild intermittent   • Carpal tunnel syndrome   • Chronic rhinitis   • Chronic tension-type headache, not intractable   • Classic migraine with aura   • Pain in both feet   • Bipolar depression ([address])         HPI (per admission H&P note on 10/24)     HPI:   "Lilia Orourke is a 28 y.o. female who presents with 4 hours of left-sided back pain. She does not have any abdominal pain, no hematuria, no dysuria. She has a history of kidney stones for which she has been treated for in the past.  She denies any nausea or vomiting. She was given Toradol in the emergency department with little relief. She does not have any dysuria and no bowel or bladder symptoms. She has not had any fevers at home and has not anything recently. She states that she drinks approximately 2 water bottles a day but does drink a fair amount of iced tea. She has never had her kidney stones tested. She does have a history of cholelithiasis which resulted in a cholecystectomy"      HOSPITAL COURSE:     Hospital Course:   28 y.o. female admitted on 10/23/2023 for left side flank pain; 4 mm calculus in the region of the left UVJ causing moderate hydroureter and mild hydronephrosis. . Multiple nonobstructing bilateral renal calculi; received IVF with Toradol injection; pain improved after reportedly passing 2 stones in urine. Stable for d/c with plan for follow up with PCP and possible repeat US as needed to monitor hydronephrosis. On 10/24/23, HD# 1, pt remains stable and is medically cleared for discharge home       COMPLICATIONS:     Complications: NONE       PROCEDURES:     Procedures Performed:   No orders of the defined types were placed in this encounter. SIGNIFICANT FINDINGS / ABNORMAL RESULTS:     Significant Findings/Abnormal Results with this admission  CT renal stone study abdomen pelvis wo contrast    Result Date: 10/23/2023  Impression: 4 mm calculus in the region of the left UVJ causing moderate hydroureter and mild hydronephrosis. Multiple nonobstructing bilateral renal calculi. The study was marked in [facility] for immediate notification.  Workstation performed: [id number] VITALS ON DISCHARGE DATE:     Vitals  Blood Pressure: 101/59 (10/24/23 0901)  Temperature: 98.6 °F (37 °C) (10/24/23 0816)  Temp Source: Oral (10/24/23 0816)  Pulse: 69 (10/24/23 0816)  Respirations: 14 (10/24/23 0816)  SpO2: 98 % (10/24/23 0816)  Height: 5' 2" (157.5 cm) (10/24/23 0500)  Weight - Scale: 78.5 kg (173 lb 1 oz) (10/24/23 0500)    Temp:  [97.3 °F (36.3 °C)-98.6 °F (37 °C)] 98.6 °F (37 °C)  HR:  [69-92] 69  Resp:  [14-22] 14  BP: ()/(47-88) 101/59    Weight (last 2 days)       Date/Time Weight    10/24/23 0500 78.5 (173.06)            No intake or output data in the 24 hours ending 10/24/23 1115    Invasive Devices       Peripheral Intravenous Line  Duration             Peripheral IV 10/23/23 Distal;Dorsal (posterior); Left Forearm <1 day                      PHYSICAL EXAM ON DAY OF DISCHARGE:       Physical Exam:     General: Pt observed lying comfortably in bed, NAD. Not toxic/ill-appearing. No cachectic or diaphoresis. No obvious sign of trauma or bleeding. Psych: AAOx4, able to converse appropriately. Denies SH/SI. Neuro: no gross neurological deficits, CN 2-12 grossly intact  Head: atraumatic, normocephalic. Eyes: open spontaneously, EOM intact, LEYLA, conjunctiva non-injected, no scleral icterus, no discharge. Ear: normal external ear, no visible drainage at external auditory orifice  Nose: clear, no epistaxis, no rhinorrhea. Throat: clear, no hoarse voice, no cough. Neck: supple, normal ROM. Heart: RRR, no murmur/distant heart sound appreciated. Lungs: LCTABL, nml respiratory effort, no agonal/labored breathing, no accessory muscle use. Abdomen: soft, nontender, nondistended, normal bowel sound, 1-2/10 L. Flank pain   Extremities: +4 strengths b/l. Strong radial/pedal pulses. No weakness/paresthesia/edema. CONDITION AT DISCHARGE:   On day of discharge patient is seen and evaluated at bedside. Patient is stable and without concern. Patient denies any pain or SOB.  Patient able to tolerate PO food without N/V/D and had bowel movement and baseline urine output. Patient able to ambulate independently without assistance. Patient is aware of current health status and understand plan of treatment and outpatient follow-up. The patient understood and agreed with the plan. All pertinent lab results, imaging studies, procedures, and/or any incidental findings have been disclosed to the patient. All pertinent questions are answered to patient's satisfaction. On day of discharge, the patient was hemodynamically stable and appropriate for discharge home. Condition at Discharge:        DISCHARGE MEDICATIONS:     Discharge Medications:  See after visit summary (AVS) for detailed reconciled discharge medications, which was provided to patient and family. Summary of medication changes made with this admission:    START:  Flomax  Naproxen     STOP:  Excessive ice tea consumption     RESUME:  All other home medications       FOLLOW-UP APPOINTMENTS / INSTRUCTION :     Important Physician Related Follow Up:   PCP     Appointment confirmed:  Future Appointments   Date Time Provider [address] [place] [address]   10/24/2023  1:00 PM OBGYN NURSE SUSH[facility]   3/12/2024  2:30 PM DO [facility]       Discharge instructions/Information to patient and family:   See after visit summary (AVS) for information provided to patient and family. Provisions for Follow-Up Care:  See after visit summary for information related to follow-up care and any pertinent home health orders. DISPOSITION:     Disposition: Home    Discharge Statement   I spent 30  minutes discharging the patient. This time was spent on the day of discharge. I had direct contact with the patient on the day of discharge. Additional documentation is required if more than 30 minutes were spent on discharge.      Planned Readmission: Josie Ramesh DO  10/24/23  11:15 AM    Dear reader, please be aware that portions of my note contain dictated text. I have done my best to proof-read this note prior to signing. However, there may be occasional unnoticed errors pertaining to "sound-alike" words and/or grammar during my dictation process. If there is any words or information that is unclear or appears erroneous, please kindly let me know and I will clarify and/or addend my notes accordingly. Thank you for your understanding.

## 2023-10-24 NOTE — ASSESSMENT & PLAN NOTE
Presents to the ED with 4 hours of left flank pain  CT renal stone study on 10/23/2023 showed 4 mm calculus in the region of the left UVJ causing moderate hydroureter and mild hydronephrosis. . Multiple nonobstructing bilateral renal calculi.   Pain persisted despite Toradol injections in the ED  10/24: Reported passed 2 small stones; pain is significantly improved s/p stone passages and IVF  Plan:  Discharge home with Naproxen for pain PRN  Flomax for 14 days   Consider repeat US to monitor hydronephrosis

## 2023-10-24 NOTE — PLAN OF CARE
Pt received from the ED at 0500. AAOX4. Denies nausea at this time. Pain controlled with schedule Toradol. IVF infusing. Skin intact. Call bell within reach.     Problem: PAIN - ADULT  Goal: Verbalizes/displays adequate comfort level or baseline comfort level  Description: Interventions:  - Encourage patient to monitor pain and request assistance  - Assess pain using appropriate pain scale  - Administer analgesics based on type and severity of pain and evaluate response  - Implement non-pharmacological measures as appropriate and evaluate response  - Consider cultural and social influences on pain and pain management  - Notify physician/advanced practitioner if interventions unsuccessful or patient reports new pain  Outcome: Progressing

## 2023-10-24 NOTE — ED PROVIDER NOTES
Chief Complaint   Patient presents with    Back Pain     Patient reports back pain for the last 4 hours. Unknown injury      History of Present Illness and Review of Systems   This is a 28 y.o. female with PMH significant for ureteral stones, gastric sleeve, asthma coming in today with complaint of back pain. The patient reports she had acute onset left-sided flank pain that started 4 hours ago. Reports that she feels like she is having a kidney stone. Denies any hematuria or dysuria. She has no abdominal tenderness. Denies any back pain. No saddle anesthesia difficulties walking history of immunosuppression or otherwise. No history of spinal cementation. Denies any episodes of syncope. No concerns for pregnancy. No missed menstrual periods or vaginal bleeding. She is currently tolerating p.o. No drug alcohol use. No other symptoms currently. - No language barrier. No other complaints for this encounter. Remainder of ROS Reviewed and Non-Pertinent    Past Medical, Past Surgical History:    has a past medical history of Anemia and Asthma. has a past surgical history that includes GASTRECTOMY SLEEVE LAPAROSCOPIC;  section; and CHOLECYSTECTOMY LAPAROSCOPIC (N/A, 2020). Allergies:   No Known Allergies    Social and Family History:     Social History     Substance and Sexual Activity   Alcohol Use Yes    Comment: socially     Social History     Tobacco Use   Smoking Status Never   Smokeless Tobacco Never     Social History     Substance and Sexual Activity   Drug Use No       Physical Examination     Vitals:    10/24/23 0330 10/24/23 0500 10/24/23 0816 10/24/23 0901   BP:  105/60 110/68 101/59   BP Location:  Right arm Right arm    Pulse: 70 75 69    Resp: 16 16 14    Temp:  98.5 °F (36.9 °C) 98.6 °F (37 °C)    TempSrc:  Oral Oral    SpO2: 99% 97% 98%    Weight:  78.5 kg (173 lb 1 oz)     Height:  5' 2" (1.575 m)         Physical Exam  Vitals and nursing note reviewed. Constitutional:       General: She is not in acute distress. Appearance: She is well-developed. HENT:      Head: Normocephalic and atraumatic. Eyes:      Conjunctiva/sclera: Conjunctivae normal.   Cardiovascular:      Rate and Rhythm: Normal rate and regular rhythm. Heart sounds: No murmur heard. Pulmonary:      Effort: Pulmonary effort is normal. No respiratory distress. Breath sounds: Normal breath sounds. Abdominal:      Palpations: Abdomen is soft. Tenderness: There is no abdominal tenderness. There is left CVA tenderness. There is no guarding or rebound. Musculoskeletal:         General: No swelling. Cervical back: Neck supple. Skin:     General: Skin is warm and dry. Capillary Refill: Capillary refill takes less than 2 seconds. Neurological:      General: No focal deficit present. Mental Status: She is alert. Cranial Nerves: No cranial nerve deficit. Motor: No weakness. Gait: Gait normal.   Psychiatric:         Mood and Affect: Mood normal.           Procedures   Procedures      MDM:   Medical Decision Making  Dana Araujo is a 28 y. o. who presents with complaints of flank pain    Vital signs are unremarkable, physical exam shows patient has left-sided CVA tenderness, no abdominal tenderness, no neurologic deficits with a normal gait and bilateral 5 out of 5 strength and no midline back tenderness    Ddx: Overall likely consistent with ureteral stone. Also may be related musculoskeletal back pain. Doubtful to be related to intra-abdominal etiology given her benign examination. Cannot exclude pregnancy either. However further work-up is warranted. Plan: Workup will include CBC, CMP, UA, UPT, CT renal stone, symptomatic control. Will monitor closely and reassess. Reassessment/Disposition: Remainder of care signed out. Imaging/labs pending at time of sign out. Admitted for pain control.         Amount and/or Complexity of Data Reviewed  Labs: ordered. Radiology: ordered. Risk  Prescription drug management. Decision regarding hospitalization.        - Reviewed relevant past office visits/hospitalizations/procedures  -Obtained pertinent history that influenced decision making from the pt        Final Dispo   Final Diagnosis:  1. Kidney stone    2.  Intractable pain      Time reflects when diagnosis was documented in both MDM as applicable and the Disposition within this note       Time User Action Codes Description Comment    10/23/2023 11:07 PM Mukund Pearce Add [N20.0] Kidney stone     10/23/2023 11:07 PM Mukund Pearce Add [R52] Intractable pain           ED Disposition       ED Disposition   Admit    Condition   Stable    Date/Time   Mon Oct 23, 2023 11:07 PM    Comment   Case was discussed with Dr. Leandrew Goltz and the patient's admission status was agreed to be Admission Status: inpatient status to the service of Dr. Nanci Benson up With Specialties Details Why Contact Info    Mónica Stock MD Family Medicine Follow up in 2 day(s)  [address] [place] [id number]      [place] [address]  [address] [place] [address] [address], MD Family Medicine   [address] [place] [address] [phone]            Medications   albuterol (PROVENTIL HFA,VENTOLIN HFA) inhaler 2 puff (has no administration in time range)   Lumateperone (Caplyta) capsule 42 mg (42 mg Oral Not Given 10/24/23 0329)   cloNIDine (CATAPRES) tablet 0.1 mg (0.1 mg Oral Not Given 10/24/23 0901)   ferrous sulfate tablet 325 mg (325 mg Oral Given 10/24/23 0902)   hydrOXYzine HCL (ATARAX) tablet 50 mg (50 mg Oral Given 10/24/23 0047)   prazosin (MINIPRESS) capsule 2 mg (2 mg Oral Not Given 10/24/23 0056)   senna-docusate sodium (SENOKOT S) 8.6-50 mg per tablet 1 tablet (1 tablet Oral Given 10/24/23 0902)   sertraline (ZOLOFT) tablet 50 mg (50 mg Oral Given 10/24/23 0901)   topiramate (TOPAMAX) tablet 25 mg (0 mg Oral Hold 10/24/23 0900)   ondansetron (ZOFRAN) injection 4 mg (has no administration in time range)   lactated ringers infusion (0 mL/hr Intravenous Stopped 10/24/23 1129)   ketorolac (TORADOL) injection 15 mg (15 mg Intravenous Not Given 10/24/23 1129)   acetaminophen (TYLENOL) tablet 975 mg (975 mg Oral Given 10/24/23 0901)   ketorolac (TORADOL) injection 15 mg (15 mg Intravenous Given 10/23/23 2020)   metoclopramide (REGLAN) injection 10 mg (10 mg Intravenous Given 10/23/23 2020)   ketorolac (TORADOL) injection 15 mg (15 mg Intravenous Given 10/23/23 2203)   ceftriaxone (ROCEPHIN) 1 g/50 mL in dextrose IVPB (0 mg Intravenous Stopped 10/24/23 0329)   HYDROmorphone (DILAUDID) injection 0.5 mg (0.5 mg Intravenous Given 10/23/23 2329)   lactated ringers bolus 1,000 mL (0 mL Intravenous Stopped 10/24/23 0329)       Risk Stratification Tools                Orders Placed This Encounter   Procedures    CT renal stone study abdomen pelvis wo contrast    CBC and differential    Comprehensive metabolic panel    Urine Microscopic    Diet Regular; Regular House    Insert peripheral IV    Urine dip analyzer    Nursing communication Continue IV as ordered.     Notify admitting physician    Notify admitting physician on arrival    Vital Signs per unit routine    Up and OOB as tolerated    Patient may shower    Strain all urine    Level 1-Full Code: all life saving measures are indicated    POCT pregnancy, urine    INPATIENT ADMISSION    Discharge patient       Labs:     Labs Reviewed   CBC AND DIFFERENTIAL - Abnormal       Result Value Ref Range Status    WBC 6.35  4.31 - 10.16 Thousand/uL Final    RBC 4.24  3.81 - 5.12 Million/uL Final    Hemoglobin 8.9 (*) 11.5 - 15.4 g/dL Final    Hematocrit 30.9 (*) 34.8 - 46.1 % Final    MCV 73 (*) 82 - 98 fL Final    MCH 21.0 (*) 26.8 - 34.3 pg Final    MCHC 28.8 (*) 31.4 - 37.4 g/dL Final    RDW 17.4 (*) 11.6 - 15.1 % Final    MPV 10.3  8.9 - 12.7 fL Final    Platelets 259  226 - 390 Thousands/uL Final    nRBC 0  /100 WBCs Final    Neutrophils Relative 72  43 - 75 % Final    Immat GRANS % 0  0 - 2 % Final    Lymphocytes Relative 19  14 - 44 % Final    Monocytes Relative 8  4 - 12 % Final    Eosinophils Relative 1  0 - 6 % Final    Basophils Relative 0  0 - 1 % Final    Neutrophils Absolute 4.53  1.85 - 7.62 Thousands/µL Final    Immature Grans Absolute 0.02  0.00 - 0.20 Thousand/uL Final    Lymphocytes Absolute 1.22  0.60 - 4.47 Thousands/µL Final    Monocytes Absolute 0.51  0.17 - 1.22 Thousand/µL Final    Eosinophils Absolute 0.06  0.00 - 0.61 Thousand/µL Final    Basophils Absolute 0.01  0.00 - 0.10 Thousands/µL Final   URINE MICROSCOPIC - Abnormal    RBC, UA 1-2  None Seen, 1-2 /hpf Final    WBC, UA 4-10 (*) None Seen, 1-2 /hpf Final    Epithelial Cells Occasional  None Seen, Occasional /hpf Final    Bacteria, UA Occasional  None Seen, Occasional /hpf Final    MUCUS THREADS Innumerable (*) None Seen Final   URINE MACROSCOPIC, POC - Abnormal    Color, UA Yellow   Final    Clarity, UA Slightly Cloudy   Final    pH, UA 7.0  4.5 - 8.0 Final    Leukocytes, UA Trace (*) Negative Final    Nitrite, UA Negative  Negative Final    Protein, UA 30 (1+) (*) Negative mg/dl Final    Glucose, UA Negative  Negative mg/dl Final    Ketones, UA Trace (*) Negative mg/dl Final    Urobilinogen, UA 1.0  0.2, 1.0 E.U./dl E.U./dl Final    Bilirubin, UA Negative  Negative Final    Occult Blood, UA Negative  Negative Final    Specific Gravity, UA 1.025  1.003 - 1.030 Final    Narrative:     CLINITEK RESULT   POCT PREGNANCY, URINE - Normal    EXT Preg Test, Ur Negative   Final    Control Valid   Final   COMPREHENSIVE METABOLIC PANEL    Sodium 952  135 - 147 mmol/L Final    Potassium 4.1  3.5 - 5.3 mmol/L Final    Chloride 108  96 - 108 mmol/L Final    CO2 22  21 - 32 mmol/L Final    ANION GAP 7  mmol/L Final    BUN 14  5 - 25 mg/dL Final    Creatinine 0.64  0.60 - 1.30 mg/dL Final    Comment: Standardized to IDMS reference method    Glucose 94  65 - 140 mg/dL Final    Comment: If the patient is fasting, the ADA then defines impaired fasting glucose as > 100 mg/dL and diabetes as > or equal to 123 mg/dL. Calcium 8.6  8.4 - 10.2 mg/dL Final    AST 13  13 - 39 U/L Final    ALT 8  7 - 52 U/L Final    Comment: Specimen collection should occur prior to Sulfasalazine administration due to the potential for falsely depressed results. Alkaline Phosphatase 57  34 - 104 U/L Final    Total Protein 7.1  6.4 - 8.4 g/dL Final    Albumin 4.0  3.5 - 5.0 g/dL Final    Total Bilirubin 0.36  0.20 - 1.00 mg/dL Final    Comment: Use of this assay is not recommended for patients undergoing treatment with eltrombopag due to the potential for falsely elevated results. N-acetyl-p-benzoquinone imine (metabolite of Acetaminophen) will generate erroneously low results in samples for patients that have taken an overdose of Acetaminophen. eGFR 116  ml/min/1.73sq m Final    Narrative:     [place] guidelines for Chronic Kidney Disease (CKD):     Stage 1 with normal or high GFR (GFR > 90 mL/min/1.73 square meters)    Stage 2 Mild CKD (GFR = 60-89 mL/min/1.73 square meters)    Stage 3A Moderate CKD (GFR = 45-59 mL/min/1.73 square meters)    Stage 3B Moderate CKD (GFR = 30-44 mL/min/1.73 square meters)    Stage 4 Severe CKD (GFR = 15-29 mL/min/1.73 square meters)    Stage 5 End Stage CKD (GFR <15 mL/min/1.73 square meters)  Note: GFR calculation is accurate only with a steady state creatinine       Imaging:     CT renal stone study abdomen pelvis wo contrast   Final Result by Stephanie Jalloh MD (10/23 9139)      4 mm calculus in the region of the left UVJ causing moderate hydroureter and mild hydronephrosis. Multiple nonobstructing bilateral renal calculi. The study was marked in [facility] for immediate notification.             Workstation performed: [id number] All details of the evaluation and treatment plan were made clear and additionally all questions and concerns were addressed while under my care. Portions of the record may have been created with voice recognition software. Occasional wrong word or "sound a like" substitutions may have occurred due to the inherent limitations of voice recognition software. Read the chart carefully and recognize, using context, where substitutions have occurred. The attending physician physically available and evaluated the above patient alongside myself.       Noemí Strong MD  10/24/23 1090

## 2023-10-24 NOTE — PLAN OF CARE
Problem: PAIN - ADULT  Goal: Verbalizes/displays adequate comfort level or baseline comfort level  Description: Interventions:  - Encourage patient to monitor pain and request assistance  - Assess pain using appropriate pain scale  - Administer analgesics based on type and severity of pain and evaluate response  - Implement non-pharmacological measures as appropriate and evaluate response  - Consider cultural and social influences on pain and pain management  - Notify physician/advanced practitioner if interventions unsuccessful or patient reports new pain  Outcome: Progressing     Problem: INFECTION - ADULT  Goal: Absence or prevention of progression during hospitalization  Description: INTERVENTIONS:  - Assess and monitor for signs and symptoms of infection  - Monitor lab/diagnostic results  - Monitor all insertion sites, i.e. indwelling lines, tubes, and drains  - Monitor endotracheal if appropriate and nasal secretions for changes in amount and color  - [place] appropriate cooling/warming therapies per order  - Administer medications as ordered  - Instruct and encourage patient and family to use good hand hygiene technique  - Identify and instruct in appropriate isolation precautions for identified infection/condition  Outcome: Progressing  Goal: Absence of fever/infection during neutropenic period  Description: INTERVENTIONS:  - Monitor WBC    Outcome: Progressing     Problem: SAFETY ADULT  Goal: Patient will remain free of falls  Description: INTERVENTIONS:  - Educate patient/family on patient safety including physical limitations  - Instruct patient to call for assistance with activity   - Consult OT/PT to assist with strengthening/mobility   - Keep Call bell within reach  - Keep bed low and locked with side rails adjusted as appropriate  - Keep care items and personal belongings within reach  - Initiate and maintain comfort rounds  - Make Fall Risk Sign visible to staff  - Apply yellow socks and bracelet for high fall risk patients  - Consider moving patient to room near nurses station  Outcome: Progressing  Goal: Maintain or return to baseline ADL function  Description: INTERVENTIONS:  -  Assess patient's ability to carry out ADLs; assess patient's baseline for ADL function and identify physical deficits which impact ability to perform ADLs (bathing, care of mouth/teeth, toileting, grooming, dressing, etc.)  - Assess/evaluate cause of self-care deficits   - Assess range of motion  - Assess patient's mobility; develop plan if impaired  - Assess patient's need for assistive devices and provide as appropriate  - Encourage maximum independence but intervene and supervise when necessary  - Involve family in performance of ADLs  - Assess for home care needs following discharge   - Consider OT consult to assist with ADL evaluation and planning for discharge  - Provide patient education as appropriate  Outcome: Progressing  Goal: Maintains/Returns to pre admission functional level  Description: INTERVENTIONS:  - Perform BMAT or MOVE assessment daily.   - Set and communicate daily mobility goal to care team and patient/family/caregiver. - Collaborate with rehabilitation services on mobility goals if consulted  - Perform Range of Motion 4 times a day. - Reposition patient every 2 hours.   - Dangle patient 4 times a day  - Stand patient 3 times a day  - Ambulate patient 3 times a day  - Out of bed to chair 3 times a day   - Out of bed for meals 3 times a day  - Out of bed for toileting  - Record patient progress and toleration of activity level   Outcome: Progressing     Problem: DISCHARGE PLANNING  Goal: Discharge to home or other facility with appropriate resources  Description: INTERVENTIONS:  - Identify barriers to discharge w/patient and caregiver  - Arrange for needed discharge resources and transportation as appropriate  - Identify discharge learning needs (meds, wound care, etc.)  - Arrange for interpretive services to assist at discharge as needed  - Refer to Case Management Department for coordinating discharge planning if the patient needs post-hospital services based on physician/advanced practitioner order or complex needs related to functional status, cognitive ability, or social support system  Outcome: Progressing     Problem: Knowledge Deficit  Goal: Patient/family/caregiver demonstrates understanding of disease process, treatment plan, medications, and discharge instructions  Description: Complete learning assessment and assess knowledge base.   Interventions:  - Provide teaching at level of understanding  - Provide teaching via preferred learning methods  Outcome: Progressing

## 2023-10-24 NOTE — UTILIZATION REVIEW
Initial Clinical Review    Admission: Date/Time/Statement:   Admission Orders (From admission, onward)       Ordered        10/23/23 2308  INPATIENT ADMISSION  Once                          Orders Placed This Encounter   Procedures    INPATIENT ADMISSION     Standing Status:   Standing     Number of Occurrences:   1     Order Specific Question:   Level of Care     Answer:   Med Surg [16]     Order Specific Question:   Estimated length of stay     Answer:   More than 2 Midnights     Order Specific Question:   Certification     Answer:   I certify that inpatient services are medically necessary for this patient for a duration of greater than two midnights. See H&P and MD Progress Notes for additional information about the patient's course of treatment. ED Arrival Information       Expected   -    Arrival   10/23/2023 19:49    Acuity   Urgent              Means of arrival   Walk-In    Escorted by   Self    Service   Family Medicine    Admission type   Emergency              Arrival complaint   Flank Pain             Chief Complaint   Patient presents with    Back Pain     Patient reports back pain for the last 4 hours. Unknown injury        Initial Presentation: 28 y.o. female to ED presents for left-sided back pain x4 hours. Given Toradol in ED with little relief. Pt states that she drinks approx. 2 water bottles a day but does drink a fair amount of iced tea. PMH for Kidney stones and treated. Cholelithiasis with cholecystectomy. Bipolar depression, Iron deficiency anemia and Chronic idiopathic constipation. Admit Inpatient level of care for Ureterolithiasis. IVFs, Scheduled Iv Toradol 15 mg for pain x4 doses. Tylenol 975 mg prn. Strain urine with every void. Consider Flomax daily. Continue home meds. CT renal sone study (10/23/23) -  showed 4 mm calculus in the region of the left UVJ causing moderate hydroureter and mild hydronephrosis. . Multiple nonobstructing bilateral renal calculi.      Date: 10/24   Day 2:   Progress notes; Started on IVFs. Pt was able to pass 2 stones today, She is feeling much better. Naproxen for pain. Flomax for 14 days. Continue home meds. Tentative d/c home today      ED Triage Vitals   Temperature Pulse Respirations Blood Pressure SpO2   10/23/23 1951 10/23/23 1951 10/23/23 1951 10/23/23 1951 10/23/23 1951   (!) 97.3 °F (36.3 °C) 92 22 119/88 99 %      Temp Source Heart Rate Source Patient Position - Orthostatic VS BP Location FiO2 (%)   10/23/23 1951 10/24/23 0053 10/23/23 1951 10/23/23 1951 --   Temporal Monitor Sitting Left arm       Pain Score       10/23/23 1951       8          Wt Readings from Last 1 Encounters:   10/24/23 78.5 kg (173 lb 1 oz)     Additional Vital Signs:   10/24/23 0901 -- -- -- 101/59 -- -- -- --   10/24/23 0816 98.6 °F (37 °C) 69 14 110/68 84 98 % None (Room air) Lying   10/24/23 0500 98.5 °F (36.9 °C) 75 16 105/60 77 97 % None (Room air) Lying   10/24/23 0330 -- 70 16 -- -- 99 % None (Room air) --   10/24/23 0100 -- 78 17 99/62 68 98 % None (Room air) Lying   10/24/23 0053 98.6 °F (37 °C) 82 16 90/47 Abnormal  -- 99 % None (Room air) Lying     Pertinent Labs/Diagnostic Test Results:   CT renal stone study abdomen pelvis wo contrast   Final Result by Stephani Lugo MD (10/23 2259)      4 mm calculus in the region of the left UVJ causing moderate hydroureter and mild hydronephrosis. Multiple nonobstructing bilateral renal calculi. The study was marked in [facility] for immediate notification.             Workstation performed: [id number]               Results from last 7 days   Lab Units 10/23/23  2024   WBC Thousand/uL 6.35   HEMOGLOBIN g/dL 8.9*   HEMATOCRIT % 30.9*   PLATELETS Thousands/uL 315   NEUTROS ABS Thousands/µL 4.53         Results from last 7 days   Lab Units 10/23/23  2024   SODIUM mmol/L 137   POTASSIUM mmol/L 4.1   CHLORIDE mmol/L 108   CO2 mmol/L 22   ANION GAP mmol/L 7   BUN mg/dL 14   CREATININE mg/dL 0.64   EGFR ml/min/1.73sq m 116 CALCIUM mg/dL 8.6     Results from last 7 days   Lab Units 10/23/23  2024   AST U/L 13   ALT U/L 8   ALK PHOS U/L 57   TOTAL PROTEIN g/dL 7.1   ALBUMIN g/dL 4.0   TOTAL BILIRUBIN mg/dL 0.36         Results from last 7 days   Lab Units 10/23/23  2024   GLUCOSE RANDOM mg/dL 94           Results from last 7 days   Lab Units 10/23/23  2033   CLARITY UA  Slightly Cloudy   COLOR UA  Yellow   SPEC GRAV UA  1.025   PH UA  7.0   GLUCOSE UA mg/dl Negative   KETONES UA mg/dl Trace*   BLOOD UA  Negative   PROTEIN UA mg/dl 30 (1+)*   NITRITE UA  Negative   BILIRUBIN UA  Negative   UROBILINOGEN UA E.U./dl 1.0   LEUKOCYTES UA  Trace*   WBC UA /hpf 4-10*   RBC UA /hpf 1-2   BACTERIA UA /hpf Occasional   EPITHELIAL CELLS WET PREP /hpf Occasional   MUCUS THREADS  Innumerable*       ED Treatment:   Medication Administration from 10/23/2023 1949 to 10/24/2023 0500         Date/Time Order Dose Route Action     10/23/2023 2020 EDT ketorolac (TORADOL) injection 15 mg 15 mg Intravenous Given     10/23/2023 2020 EDT metoclopramide (REGLAN) injection 10 mg 10 mg Intravenous Given     10/23/2023 2203 EDT ketorolac (TORADOL) injection 15 mg 15 mg Intravenous Given     10/23/2023 2329 EDT ceftriaxone (ROCEPHIN) 1 g/50 mL in dextrose IVPB 1,000 mg Intravenous New Bag     10/23/2023 2329 EDT HYDROmorphone (DILAUDID) injection 0.5 mg 0.5 mg Intravenous Given     10/24/2023 0047 EDT hydrOXYzine HCL (ATARAX) tablet 50 mg 50 mg Oral Given     10/24/2023 0045 EDT lactated ringers bolus 1,000 mL 1,000 mL Intravenous New Bag     10/24/2023 3518 EDT lactated ringers infusion 125 mL/hr Intravenous New Bag          Past Medical History:   Diagnosis Date    Anemia     Asthma      Present on Admission:   Ureterolithiasis   Iron deficiency anemia   Chronic idiopathic constipation      Admitting Diagnosis: Kidney stone [N20.0]  Back pain [M54.9]  Intractable pain [R52]  Age/Sex: 28 y.o. female    Admission Orders:  Scheduled Medications:  acetaminophen, 975 mg, Oral, Q8H [address]  cloNIDine, 0.1 mg, Oral, BID  ferrous sulfate, 325 mg, Oral, Daily With Breakfast  hydrOXYzine HCL, 50 mg, Oral, HS  ketorolac, 15 mg, Intravenous, Q6H [address]  Lumateperone, 42 mg, Oral, HS  prazosin, 2 mg, Oral, HS  senna-docusate sodium, 1 tablet, Oral, Daily  sertraline, 50 mg, Oral, QAM  topiramate, 25 mg, Oral, Daily      Continuous IV Infusions:  lactated ringers, 125 mL/hr, Intravenous, Continuous      PRN Meds:  albuterol, 2 puff, Inhalation, Q6H PRN  ondansetron, 4 mg, Intravenous, Q6H PRN        None    Network Utilization Review Department  ATTENTION: Please call with any questions or concerns to [phone] and carefully listen to the prompts so that you are directed to the right person. All voicemails are confidential.   For Discharge needs, contact Care Management DC Support Team at [phone] opt. 2  Send all requests for admission clinical reviews, approved or denied determinations and any other requests to dedicated fax number below belonging to the campus where the patient is receiving treatment.  List of dedicated fax numbers for the Facilities:  [place] DENIALS (Administrative/Medical Necessity) [phone]   DISCHARGE SUPPORT TEAM (NETWORK) [id number] Haile Mohamud (Maternity/NICU/Pediatrics) [phone]   [address] [facility] 1000 [facility] [phone]   1503 [facility] 207 [facility] 5220 [facility] [address] [address] [facility] [address] [facility] [address] [place] [address] Nn [phone]

## 2023-10-24 NOTE — UTILIZATION REVIEW
NOTIFICATION OF INPATIENT ADMISSION   AUTHORIZATION REQUEST   SERVICING FACILITY:   [address] [place] [address]  Address: [address] [place] [address] [address] [facility] [address] [address]  Tax ID: [id number]  NPI: [id number] ATTENDING PROVIDER:  Attending Name and NPI#: Philippe Gilbert Md [[id number]]  Address: [address] [place] [address] [address] [address]  Phone: [phone]   ADMISSION INFORMATION:  Place of Service: [address] [place] [address]  Place of Service Code: 21  Inpatient Admission Date/Time: 10/23/23 11:08 PM  Discharge Date/Time: No discharge date for patient encounter. Admitting Diagnosis Code/Description:  Kidney stone [N20.0]  Back pain [M54.9]  Intractable pain [R52]     UTILIZATION REVIEW CONTACT:  Heri Smith Utilization   Network Utilization Review Department  Phone: [phone]  Fax: [phone]  Email: [email or web address]  Contact for approvals/pending authorizations, clinical reviews, and discharge. PHYSICIAN ADVISORY SERVICES:  Medical Necessity Denial & [place] Review  Phone: [phone]  Fax: [phone]  Email: [email or web address]     DISCHARGE SUPPORT TEAM:  For Patients Discharge Needs & Updates  Phone: [phone] opt. 2 Fax: [phone]  Email: [email or web address]

## 2023-10-24 NOTE — ASSESSMENT & PLAN NOTE
Continue Caplyta 42 mg nightly, continue hydroxyzine 50 mg nightly, continue sertraline 50 mg every morning.

## 2023-10-24 NOTE — H&P
H&P Exam - Nicholas Danger 28 y.o. female MRN: [id number]  Unit/Bed#: ED 01 Encounter: [id number]  Admission status: Observation    DATE: 10/24/2023  TIME: 12:05 AM  Primary Care Physician: Jatin Donahue MD  Admitting Provider: Faby Peralta MD    Patient is a 28 y.o. female being admitted for intractable pain secondary to kidney stone under SL with:     Ureterolithiasis  Assessment & Plan  Presents to the ED with 4 hours of left flank pain  CT renal stone study on 10/23/2023 showed 4 mm calculus in the region of the left UVJ causing moderate hydroureter and mild hydronephrosis. . Multiple nonobstructing bilateral renal calculi. Pain persisted despite Toradol injections in the ED    Plan:  1 L lactated ringer bolus and 125 mL/h x 24 hours thereafter  Schedule Toradol 15 mg injections for pain x4 doses  Tylenol 975 mg as needed for moderate pain  Consider Flomax daily  Strain urine with every void    Bipolar depression (HCC)  Assessment & Plan  Continue Caplyta 42 mg nightly, continue hydroxyzine 50 mg nightly, continue sertraline 50 mg every morning. Iron deficiency anemia  Assessment & Plan  Continue ferrous sulfate 325 mg daily    Chronic idiopathic constipation  Assessment & Plan  Continue Senokot S 8.6-50 mg daily      Diet:        Diet Orders   (From admission, onward)                 Start     Ordered    10/23/23 2350  Diet Regular; Regular House  Diet effective now        References:    Adult Nutrition Support Algorithm    RD Therapeutic Diet Order Protocol   Question Answer Comment   Diet Type Regular    Regular Regular House    RD to adjust diet per protocol?  Yes        10/23/23 2349                Regular diet    Code Status: Level 1 - Full Code  POLST:    VTE Prophylaxis: Reason for no pharmacologic prophylaxis patient is ambulatory  reason for no mechanical VTE prophylaxis patient is ambulatory  Admission Status: OBSERVATION  Dispo:    History of Present Illness     HPI:  Nicholas Zapien is a 28 y.o. female who presents with 4 hours of left-sided back pain. She does not have any abdominal pain, no hematuria, no dysuria. She has a history of kidney stones for which she has been treated for in the past.  She denies any nausea or vomiting. She was given Toradol in the emergency department with little relief. She does not have any dysuria and no bowel or bladder symptoms. She has not had any fevers at home and has not anything recently. She states that she drinks approximately 2 water bottles a day but does drink a fair amount of iced tea. She has never had her kidney stones tested. She does have a history of cholelithiasis which resulted in a cholecystectomy. ED Management: Toradol injection with little relief    Review of Systems   Constitutional:  Negative for chills and fever. Eyes:  Negative for visual disturbance. Respiratory:  Negative for cough and shortness of breath. Cardiovascular:  Negative for chest pain and palpitations. Gastrointestinal:  Negative for abdominal pain, constipation, diarrhea, nausea and vomiting. Genitourinary:  Positive for flank pain. Negative for difficulty urinating, dysuria, frequency and hematuria. Musculoskeletal:  Negative for arthralgias and back pain. Skin:  Negative for color change and rash. Neurological:  Negative for dizziness and headaches. All other systems reviewed and are negative. Historical Information   Past Medical History:   Diagnosis Date    Anemia     Asthma      Past Surgical History:   Procedure Laterality Date     SECTION      4 times    CHOLECYSTECTOMY LAPAROSCOPIC N/A 2020    Procedure: CHOLECYSTECTOMY LAPAROSCOPIC;  Surgeon: lEis Bender;   Location: [facility];  Service: General    GASTRECTOMY SLEEVE LAPAROSCOPIC       Social History   Social History     Substance and Sexual Activity   Alcohol Use Yes    Comment: socially     Social History     Substance and Sexual Activity   Drug Use No Social History     Tobacco Use   Smoking Status Never   Smokeless Tobacco Never     Family History: non-contributory    Meds/Allergies   all medications and allergies reviewed  No Known Allergies    Objective   Vitals: Blood pressure 119/88, pulse 92, temperature (!) 97.3 °F (36.3 °C), temperature source Temporal, resp. rate 22, SpO2 99 %. No intake or output data in the 24 hours ending 10/24/23 0005    Invasive Devices       Peripheral Intravenous Line  Duration             Peripheral IV 10/23/23 Distal;Dorsal (posterior); Left Forearm <1 day                    Physical Exam  Constitutional:       General: She is not in acute distress. Appearance: Normal appearance. She is not ill-appearing. HENT:      Head: Normocephalic and atraumatic. Nose: Nose normal.      Mouth/Throat:      Mouth: Mucous membranes are moist.      Pharynx: Oropharynx is clear. Eyes:      General: No scleral icterus. Conjunctiva/sclera: Conjunctivae normal.      Pupils: Pupils are equal, round, and reactive to light. Cardiovascular:      Rate and Rhythm: Normal rate and regular rhythm. Pulses: Normal pulses. Heart sounds: Normal heart sounds. No murmur heard. No friction rub. No gallop. Pulmonary:      Effort: Pulmonary effort is normal.      Breath sounds: Normal breath sounds. No wheezing, rhonchi or rales. Abdominal:      General: Abdomen is flat. Bowel sounds are normal.      Palpations: Abdomen is soft. There is no mass. Tenderness: There is no abdominal tenderness. There is left CVA tenderness. There is no right CVA tenderness. Musculoskeletal:         General: No swelling. Cervical back: Neck supple. Right lower leg: No edema. Left lower leg: No edema. Skin:     General: Skin is warm and dry. Neurological:      General: No focal deficit present. Mental Status: She is alert and oriented to person, place, and time.    Psychiatric:         Mood and Affect: Mood normal. Behavior: Behavior normal.         Lab Results:   I have personally reviewed pertinent reports.       Recent Results (from the past 24 hour(s))   CBC and differential    Collection Time: 10/23/23  8:24 PM   Result Value Ref Range    WBC 6.35 4.31 - 10.16 Thousand/uL    RBC 4.24 3.81 - 5.12 Million/uL    Hemoglobin 8.9 (L) 11.5 - 15.4 g/dL    Hematocrit 30.9 (L) 34.8 - 46.1 %    MCV 73 (L) 82 - 98 fL    MCH 21.0 (L) 26.8 - 34.3 pg    MCHC 28.8 (L) 31.4 - 37.4 g/dL    RDW 17.4 (H) 11.6 - 15.1 %    MPV 10.3 8.9 - 12.7 fL    Platelets 380 643 - 618 Thousands/uL    nRBC 0 /100 WBCs    Neutrophils Relative 72 43 - 75 %    Immat GRANS % 0 0 - 2 %    Lymphocytes Relative 19 14 - 44 %    Monocytes Relative 8 4 - 12 %    Eosinophils Relative 1 0 - 6 %    Basophils Relative 0 0 - 1 %    Neutrophils Absolute 4.53 1.85 - 7.62 Thousands/µL    Immature Grans Absolute 0.02 0.00 - 0.20 Thousand/uL    Lymphocytes Absolute 1.22 0.60 - 4.47 Thousands/µL    Monocytes Absolute 0.51 0.17 - 1.22 Thousand/µL    Eosinophils Absolute 0.06 0.00 - 0.61 Thousand/µL    Basophils Absolute 0.01 0.00 - 0.10 Thousands/µL   Comprehensive metabolic panel    Collection Time: 10/23/23  8:24 PM   Result Value Ref Range    Sodium 137 135 - 147 mmol/L    Potassium 4.1 3.5 - 5.3 mmol/L    Chloride 108 96 - 108 mmol/L    CO2 22 21 - 32 mmol/L    ANION GAP 7 mmol/L    BUN 14 5 - 25 mg/dL    Creatinine 0.64 0.60 - 1.30 mg/dL    Glucose 94 65 - 140 mg/dL    Calcium 8.6 8.4 - 10.2 mg/dL    AST 13 13 - 39 U/L    ALT 8 7 - 52 U/L    Alkaline Phosphatase 57 34 - 104 U/L    Total Protein 7.1 6.4 - 8.4 g/dL    Albumin 4.0 3.5 - 5.0 g/dL    Total Bilirubin 0.36 0.20 - 1.00 mg/dL    eGFR 116 ml/min/1.73sq m   Urine Macroscopic, POC    Collection Time: 10/23/23  8:33 PM   Result Value Ref Range    Color, UA Yellow     Clarity, UA Slightly Cloudy     pH, UA 7.0 4.5 - 8.0    Leukocytes, UA Trace (A) Negative    Nitrite, UA Negative Negative    Protein, UA 30 (1+) (A) Negative mg/dl    Glucose, UA Negative Negative mg/dl    Ketones, UA Trace (A) Negative mg/dl    Urobilinogen, UA 1.0 0.2, 1.0 E.U./dl E.U./dl    Bilirubin, UA Negative Negative    Occult Blood, UA Negative Negative    Specific Gravity, UA 1.025 1.003 - 1.030   Urine Microscopic    Collection Time: 10/23/23  8:33 PM   Result Value Ref Range    RBC, UA 1-2 None Seen, 1-2 /hpf    WBC, UA 4-10 (A) None Seen, 1-2 /hpf    Epithelial Cells Occasional None Seen, Occasional /hpf    Bacteria, UA Occasional None Seen, Occasional /hpf    MUCUS THREADS Innumerable (A) None Seen   POCT pregnancy, urine    Collection Time: 10/23/23  8:35 PM   Result Value Ref Range    EXT Preg Test, Ur Negative     Control Valid      Blood Culture: No results found for: "BLOODCX",   Urinalysis:   Lab Results   Component Value Date    COLORU Yellow 10/23/2023    COLORU Yellow 07/16/2014    CLARITYU Slightly Cloudy 10/23/2023    CLARITYU Clear 07/16/2014    SPECGRAV 1.025 10/23/2023    SPECGRAV >=1.030 07/16/2014    PHUR 7.0 10/23/2023    PHUR 6.0 07/16/2014    LEUKOCYTESUR Trace (A) 10/23/2023    LEUKOCYTESUR Negative 07/16/2014    NITRITE Negative 10/23/2023    NITRITE Negative 07/16/2014    PROTEINUA Negative 07/16/2014    GLUCOSEU Negative 10/23/2023    GLUCOSEU Negative 07/16/2014    KETONESU Trace (A) 10/23/2023    KETONESU Negative 07/16/2014    BILIRUBINUR Negative 10/23/2023    BILIRUBINUR Negative 07/16/2014    BLOODU Negative 10/23/2023    BLOODU Negative 07/16/2014   ,   Urine Culture:   Lab Results   Component Value Date    URINECX (A) 11/15/2022     <10,000 cfu/ml Beta Hemolytic Streptococcus Group B    URINECX 30,000-39,000 cfu/ml 11/15/2022   ,   Wound Culure: No results found for: "WOUNDCULT"    Imaging:   EKG, Pathology, and Other Studies: I have personally reviewed pertinent reports.       Bimal Muir DO  PGY-2  [facility]

## 2023-10-24 NOTE — ED ATTENDING ATTESTATION
10/23/2023  IKin DO, saw and evaluated the patient. I have discussed the patient with the resident/non-physician practitioner and agree with the resident's/non-physician practitioner's findings, Plan of Care, and MDM as documented in the resident's/non-physician practitioner's note, except where noted. All available labs and Radiology studies were reviewed. I was present for key portions of any procedure(s) performed by the resident/non-physician practitioner and I was immediately available to provide assistance. At this point I agree with the current assessment done in the Emergency Department. I have conducted an independent evaluation of this patient a history and physical is as follows:    34-year-old female presents with back pain for 4 hours. Patient states its on the left side in the flank area. Denies abdominal pain, no hematuria, no dysuria. Feels similar to previous kidney stone. On exam-no acute distress, heart regular, no respiratory distress, tenderness in the left CVA. Plan-concern for kidney stone or other pathology including Pyelo.   Will check labs, CT abdomen, check urine and reassess    ED Course         Critical Care Time  Procedures

## 2023-10-25 ENCOUNTER — CLINICAL SUPPORT (OUTPATIENT)
Dept: OBGYN CLINIC | Facility: CLINIC | Age: 33
End: 2023-10-25
Payer: COMMERCIAL

## 2023-10-25 VITALS
HEIGHT: 62 IN | SYSTOLIC BLOOD PRESSURE: 110 MMHG | DIASTOLIC BLOOD PRESSURE: 66 MMHG | BODY MASS INDEX: 32.57 KG/M2 | WEIGHT: 177 LBS

## 2023-10-25 DIAGNOSIS — Z30.42 DEPO-PROVERA CONTRACEPTIVE STATUS: Primary | ICD-10-CM

## 2023-10-25 DIAGNOSIS — N92.0 MENORRHAGIA WITH REGULAR CYCLE: ICD-10-CM

## 2023-10-25 PROCEDURE — 96372 THER/PROPH/DIAG INJ SC/IM: CPT | Performed by: OBSTETRICS & GYNECOLOGY

## 2023-10-25 RX ORDER — TRANEXAMIC ACID 650 MG/1
TABLET ORAL
COMMUNITY
Start: 2023-10-24

## 2023-10-25 RX ADMIN — MEDROXYPROGESTERONE ACETATE 150 MG: 150 INJECTION, SUSPENSION INTRAMUSCULAR at 10:05

## 2023-10-25 NOTE — PROGRESS NOTES
Depo Provera 150 mg given IM (L) buttocks without difficulty.   LOT# [id number]  EXP 3/31/2027  [address] [place] [address] [id number]

## 2023-10-31 ENCOUNTER — TELEPHONE (OUTPATIENT)
Dept: FAMILY MEDICINE CLINIC | Facility: CLINIC | Age: 33
End: 2023-10-31

## 2023-11-03 ENCOUNTER — TELEPHONE (OUTPATIENT)
Age: 33
End: 2023-11-03

## 2023-11-03 NOTE — TELEPHONE ENCOUNTER
Patient called to establish care with Urology for kidney stones. Pt was seen in hospital on 10/23 and had imaging ordered. Pt currently scheduled at [facility] office for appt of 12/29 at 9:15 AM    Please review pt's chart to see if pt is scheduled in appropriate time frame. Pt denies any discomfort at this time.      Call back [phone]

## 2023-11-09 NOTE — TELEPHONE ENCOUNTER
Called the language line:    Beatrice Kim  #[phone]    Call #1 disconnected  Call#2 A message was left by the , the appointment that was scheduled in December has now been moved to a sooner date:    Date: 11/22/2023     Arrival Time 8:05 AM     Visit Type: URGENT NEW PATIENT [facility] [[id number]]     Provider: Kaylan Campbell PA-C Department: [facility]     Please call us if you have any questions at [phone].

## 2024-01-23 DIAGNOSIS — Z30.42 ENCOUNTER FOR SURVEILLANCE OF INJECTABLE CONTRACEPTIVE: Primary | ICD-10-CM

## 2024-01-23 RX ORDER — MEDROXYPROGESTERONE ACETATE 150 MG/ML
150 INJECTION, SUSPENSION INTRAMUSCULAR
Qty: 1 ML | Refills: 1 | Status: SHIPPED | OUTPATIENT
Start: 2024-01-23

## 2024-01-23 NOTE — TELEPHONE ENCOUNTER
Patient had rf on Depo Provera prescription from 10/2023 but was cancelled by pcp on 10/24/203 therefore patient nable to  refill for today's appointment.  Verbal to phamacist for Depo Provera 150 mg #1, rf x1.  Please sign off on phone-in prescription for same.  Rescheduled appointment to 1/24/2024 (13 wk - last day patient can get inj).

## 2024-01-24 ENCOUNTER — CLINICAL SUPPORT (OUTPATIENT)
Dept: OBGYN CLINIC | Facility: CLINIC | Age: 34
End: 2024-01-24
Payer: COMMERCIAL

## 2024-01-24 VITALS
BODY MASS INDEX: 33.23 KG/M2 | DIASTOLIC BLOOD PRESSURE: 62 MMHG | SYSTOLIC BLOOD PRESSURE: 108 MMHG | WEIGHT: 180.6 LBS | HEIGHT: 62 IN

## 2024-01-24 DIAGNOSIS — Z30.42 DEPO-PROVERA CONTRACEPTIVE STATUS: Primary | ICD-10-CM

## 2024-01-24 PROCEDURE — 96372 THER/PROPH/DIAG INJ SC/IM: CPT | Performed by: NURSE PRACTITIONER

## 2024-01-24 RX ORDER — MEDROXYPROGESTERONE ACETATE 150 MG/ML
INJECTION, SUSPENSION INTRAMUSCULAR
COMMUNITY
Start: 2024-01-23

## 2024-01-24 RX ADMIN — MEDROXYPROGESTERONE ACETATE 150 MG: 150 INJECTION, SUSPENSION INTRAMUSCULAR at 13:18

## 2024-01-24 NOTE — PROGRESS NOTES
Depo Provera 150 mg given IM (L) buttocks without difficulty.  LOT# [id number]  EXP 09/2025  ND[facility] [id number]

## 2024-03-14 ENCOUNTER — OFFICE VISIT (OUTPATIENT)
Dept: FAMILY MEDICINE CLINIC | Facility: CLINIC | Age: 34
End: 2024-03-14

## 2024-03-14 VITALS
HEART RATE: 69 BPM | WEIGHT: 183 LBS | DIASTOLIC BLOOD PRESSURE: 63 MMHG | SYSTOLIC BLOOD PRESSURE: 96 MMHG | TEMPERATURE: 97.8 F | BODY MASS INDEX: 33.68 KG/M2 | RESPIRATION RATE: 20 BRPM | HEIGHT: 62 IN | OXYGEN SATURATION: 99 %

## 2024-03-14 DIAGNOSIS — J03.90 TONSILLITIS: Primary | ICD-10-CM

## 2024-03-14 LAB — S PYO AG THROAT QL: NEGATIVE

## 2024-03-14 PROCEDURE — 87880 STREP A ASSAY W/OPTIC: CPT | Performed by: FAMILY MEDICINE

## 2024-03-14 PROCEDURE — 99213 OFFICE O/P EST LOW 20 MIN: CPT | Performed by: FAMILY MEDICINE

## 2024-03-14 PROCEDURE — 87070 CULTURE OTHR SPECIMN AEROBIC: CPT

## 2024-03-14 NOTE — PROGRESS NOTES
Name: Yamileth Stroud      : 1990      MRN: [id number]  Encounter Provider: Eric Amador MD  Encounter Date: 3/14/2024   Encounter department: [facility]    Assessment & Plan     1. Tonsillitis  Comments:  2 week history of throat pain, no red flag symptoms and has good intake  Centor score 3  F/u throat culture  Treat symptomatically for now  Orders:  -     POCT rapid ANTIGEN strepA  -     Throat culture; Future  -     Throat culture           Subjective      Patient reports 2-week history of throat pain, denies any fevers, discharge, ulcers/cold sores. The pain comes and goes, and she has been using Tylenol PRN. Denies any recent sick contacts, has been maintaining good PO intake.       Review of Systems   Constitutional:  Negative for chills and fever.   HENT:  Positive for sore throat. Negative for ear pain and trouble swallowing.    Eyes:  Negative for pain and visual disturbance.   Respiratory:  Negative for cough and shortness of breath.    Cardiovascular:  Negative for chest pain and palpitations.   Gastrointestinal:  Negative for abdominal pain and vomiting.   Genitourinary:  Negative for dysuria and hematuria.   Musculoskeletal:  Negative for arthralgias and back pain.   Skin:  Negative for color change and rash.   All other systems reviewed and are negative.            Current Outpatient Medications on File Prior to Visit   Medication Sig    acetaminophen (TYLENOL) 325 mg tablet Take 2 tablets (650 mg total) by mouth every 6 (six) hours as needed for mild pain    albuterol (PROVENTIL HFA,VENTOLIN HFA) 90 mcg/act inhaler Inhale 2 puffs every 6 (six) hours as needed for shortness of breath    Caplyta 42 MG CAPS capsule TAKE 1 CAPSULE BY MOUTH AT BEDTIME**PA FAXED    cloNIDine (CATAPRES) 0.1 mg tablet Take 0.1 mg by mouth 2 (two) times a day    hydrOXYzine pamoate (VISTARIL) 50 mg capsule Take 50 mg by mouth daily at bedtime    medroxyPROGESTERone (DEPO-PROVERA)  "150 mg/mL injection Inject 1 mL (150 mg total) into a muscle every 3 (three) months    prazosin (MINIPRESS) 2 mg capsule Take 2 mg by mouth daily at bedtime    senna-docusate sodium (SENOKOT S) 8.6-50 mg per tablet Take 1 tablet by mouth daily    sertraline (ZOLOFT) 50 mg tablet Take 50 mg by mouth every morning    topiramate (TOPAMAX) 25 mg tablet     clotrimazole-betamethasone (LOTRISONE) 1-0.05 % cream Apply topically 2 (two) times a day (Patient not taking: Reported on 10/25/2023)    ferrous sulfate 325 (65 Fe) mg tablet TAKE 1 TABLET BY MOUTH EVERY DAY WITH BREAKFAST (Patient not taking: Reported on 1/24/2024)    medroxyPROGESTERone acetate (DEPO-PROVERA SYRINGE) 150 mg/mL injection INJECT 1ML INTRAMUSCULARLY EVERY 3 MONTHS AS DIRECTED    naproxen (Naprosyn) 500 mg tablet Take 1 tablet (500 mg total) by mouth 2 (two) times a day with meals for 7 days    tamsulosin (FLOMAX) 0.4 mg Take 1 capsule (0.4 mg total) by mouth daily with dinner for 14 days    Tranexamic Acid 650 MG TABS  (Patient not taking: Reported on 1/24/2024)       Objective     BP 96/63   Pulse 69   Temp 97.8 °F (36.6 °C) (Temporal)   Resp 20   Ht 5' 2\" (1.575 m)   Wt 83 kg (183 lb)   SpO2 99%   BMI 33.47 kg/m²     Physical Exam  Vitals reviewed.   Constitutional:       General: She is not in acute distress.     Appearance: Normal appearance.   HENT:      Head: Normocephalic and atraumatic.      Nose: Nose normal. No congestion or rhinorrhea.      Mouth/Throat:      Pharynx: Posterior oropharyngeal erythema (mild erythema of left tonsil but no stones/exudates) present. No oropharyngeal exudate.   Eyes:      Extraocular Movements: Extraocular movements intact.      Conjunctiva/sclera: Conjunctivae normal.   Pulmonary:      Effort: Pulmonary effort is normal.      Breath sounds: Normal breath sounds.   Musculoskeletal:         General: Normal range of motion.      Cervical back: Normal range of motion.   Neurological:      Mental Status: She " is alert.       Eric Amador MD

## 2024-03-16 LAB — BACTERIA THROAT CULT: NORMAL

## 2024-03-22 ENCOUNTER — OFFICE VISIT (OUTPATIENT)
Dept: FAMILY MEDICINE CLINIC | Facility: CLINIC | Age: 34
End: 2024-03-22

## 2024-03-22 VITALS
OXYGEN SATURATION: 100 % | WEIGHT: 185 LBS | TEMPERATURE: 99.1 F | DIASTOLIC BLOOD PRESSURE: 70 MMHG | RESPIRATION RATE: 18 BRPM | SYSTOLIC BLOOD PRESSURE: 103 MMHG | BODY MASS INDEX: 34.04 KG/M2 | HEART RATE: 70 BPM | HEIGHT: 62 IN

## 2024-03-22 DIAGNOSIS — B49 FUNGAL INFECTION: Primary | ICD-10-CM

## 2024-03-22 PROCEDURE — 99213 OFFICE O/P EST LOW 20 MIN: CPT | Performed by: FAMILY MEDICINE

## 2024-03-22 RX ORDER — CLOTRIMAZOLE 1 %
CREAM (GRAM) TOPICAL 2 TIMES DAILY
Qty: 14 G | Refills: 1 | Status: SHIPPED | OUTPATIENT
Start: 2024-03-22

## 2024-03-22 NOTE — PROGRESS NOTES
Name: Yamileth Stroud      : 1990      MRN: [id number]  Encounter Provider: Pamela Jauregui MD  Encounter Date: 3/22/2024   Encounter department: [facility]    Assessment & Plan     1. Fungal infection  Assessment & Plan:  - 3 days of b/l internal thighs, itchiness  - Denies fevers, bleeding, excessive sweating  - Wears long pants most of the time  - Denies excessive chaffing   - Self treatment with hydrocortisone cream, minimal relief  Clotrimazole cream BID for 7-10 days  Keep areas  F/U in 1 week    Orders:  -     clotrimazole (LOTRIMIN) 1 % cream; Apply topically 2 (two) times a day           Subjective      Rash  This is a new problem. The current episode started in the past 7 days. The problem is unchanged. Location: b/l upper inner thighs. The problem is mild. The rash is characterized by burning, redness and itchiness. She was exposed to nothing. The rash first occurred at home. Pertinent negatives include no cough, fever, shortness of breath, sore throat or vomiting. Past treatments include topical steroids. The treatment provided no relief. There were no sick contacts.     Review of Systems   Constitutional:  Negative for chills and fever.   HENT:  Negative for ear pain and sore throat.    Eyes:  Negative for pain and visual disturbance.   Respiratory:  Negative for cough and shortness of breath.    Cardiovascular:  Negative for chest pain and palpitations.   Gastrointestinal:  Negative for abdominal pain and vomiting.   Genitourinary:  Negative for dysuria and hematuria.   Musculoskeletal:  Negative for arthralgias and back pain.   Skin:  Positive for rash. Negative for color change.   Neurological:  Negative for seizures and syncope.   All other systems reviewed and are negative.      Current Outpatient Medications on File Prior to Visit   Medication Sig    acetaminophen (TYLENOL) 325 mg tablet Take 2 tablets (650 mg total) by mouth every 6 (six)  "hours as needed for mild pain    albuterol (PROVENTIL HFA,VENTOLIN HFA) 90 mcg/act inhaler Inhale 2 puffs every 6 (six) hours as needed for shortness of breath    Caplyta 42 MG CAPS capsule TAKE 1 CAPSULE BY MOUTH AT BEDTIME**PA FAXED    cloNIDine (CATAPRES) 0.1 mg tablet Take 0.1 mg by mouth 2 (two) times a day    hydrOXYzine pamoate (VISTARIL) 50 mg capsule Take 50 mg by mouth daily at bedtime    medroxyPROGESTERone (DEPO-PROVERA) 150 mg/mL injection Inject 1 mL (150 mg total) into a muscle every 3 (three) months    medroxyPROGESTERone acetate (DEPO-PROVERA SYRINGE) 150 mg/mL injection INJECT 1ML INTRAMUSCULARLY EVERY 3 MONTHS AS DIRECTED    prazosin (MINIPRESS) 2 mg capsule Take 2 mg by mouth daily at bedtime    senna-docusate sodium (SENOKOT S) 8.6-50 mg per tablet Take 1 tablet by mouth daily    sertraline (ZOLOFT) 50 mg tablet Take 50 mg by mouth every morning    topiramate (TOPAMAX) 25 mg tablet     clotrimazole-betamethasone (LOTRISONE) 1-0.05 % cream Apply topically 2 (two) times a day (Patient not taking: Reported on 10/25/2023)    ferrous sulfate 325 (65 Fe) mg tablet TAKE 1 TABLET BY MOUTH EVERY DAY WITH BREAKFAST (Patient not taking: Reported on 1/24/2024)    naproxen (Naprosyn) 500 mg tablet Take 1 tablet (500 mg total) by mouth 2 (two) times a day with meals for 7 days    tamsulosin (FLOMAX) 0.4 mg Take 1 capsule (0.4 mg total) by mouth daily with dinner for 14 days    Tranexamic Acid 650 MG TABS  (Patient not taking: Reported on 1/24/2024)       Objective     /70 (BP Location: Left arm, Patient Position: Sitting, Cuff Size: Standard)   Pulse 70   Temp 99.1 °F (37.3 °C) (Temporal)   Resp 18   Ht 5' 2\" (1.575 m)   Wt 83.9 kg (185 lb)   SpO2 100%   BMI 33.84 kg/m²     Physical Exam  Vitals reviewed.   Constitutional:       General: She is not in acute distress.     Appearance: Normal appearance. She is not ill-appearing.   Eyes:      Extraocular Movements: Extraocular movements intact. "   Cardiovascular:      Rate and Rhythm: Normal rate and regular rhythm.      Heart sounds: Normal heart sounds. No murmur heard.  Pulmonary:      Effort: Pulmonary effort is normal.      Breath sounds: Normal breath sounds.   Skin:     General: Skin is warm.      Findings: Rash present.             Comments: Large erythematic rash in b/l inner thighs   Neurological:      Mental Status: She is alert and oriented to person, place, and time.   Psychiatric:         Mood and Affect: Mood normal.         Behavior: Behavior normal.       Pamela Jauregui MD

## 2024-03-22 NOTE — ASSESSMENT & PLAN NOTE
- 3 days of b/l internal thighs, itchiness  - Denies fevers, bleeding, excessive sweating  - Wears long pants most of the time  - Denies excessive chaffing   - Self treatment with hydrocortisone cream, minimal relief  Clotrimazole cream BID for 7-10 days  Keep areas  F/U in 1 week

## 2024-05-04 NOTE — TELEPHONE ENCOUNTER
[facility] Hospitalist Progress Note  Subjective   Patient was seen and examined this morning.  He denies any chest pain. He c/o shortness of breath    Objective     I/O's    Intake/Output Summary (Last 24 hours) at 5/4/2024 1234  Last data filed at 5/4/2024 0500  Gross per 24 hour   Intake 560 ml   Output 1910 ml   Net -1350 ml       Last Recorded Vitals  Vitals with min/max:    -  Vital Last Value 24 Hour Range   Temperature 96.3 °F (35.7 °C) (05/04/24 1114) Temp  Min: 96.1 °F (35.6 °C)  Max: 97.7 °F (36.5 °C)   Pulse 72 (05/04/24 1114) Pulse  Min: 68  Max: 84   Respiratory 18 (05/04/24 1114) Resp  Min: 17  Max: 20   Non-Invasive  Blood Pressure 104/70 (05/04/24 1114) BP  Min: 95/63  Max: 115/83   Pulse Oximetry 91 % (05/04/24 1114) SpO2  Min: 88 %  Max: 95 %   Arterial   Blood Pressure   No data recorded      All labs and tests on this notes have been reviewed and analyzed and taken into consideration for taking care of the patient    Body mass index is 26.04 kg/m².  GENERAL:  In no apparent distress  CHEST:  diminished BS  CARDIAC:  Regular rate and rhythm.  S1 and S2  VASCULAR:  No Edema.    ABDOMEN:  Soft, without detectable tenderness.  No sign of distention.  No   rebound or guarding, and no masses palpated.  MUSCULOSKELETAL:  Good range of motion of all major joints. no calf tenderness    PSYCH:  no depression or anxiety     Labs     Recent Labs   Lab 05/04/24  0425   WBC 12.1*   RBC 4.67   HGB 15.7   HCT 49.0   *     Recent Labs   Lab 05/04/24  0425 05/03/24  0439 04/29/24  1416   SODIUM 140 136 139   POTASSIUM 4.4 4.8 4.2   CHLORIDE 104 104 103   CO2 27 22 28   BUN 22* 19 19   CREATININE 0.92 0.80 1.00   GLUCOSE 101* 154* 127*   CALCIUM 9.0 8.7 8.8         Current Facility-Administered Medications   Medication    bumetanide (BUMEX) injection 1 mg    empagliflozin (JARDIANCE) tablet 10 mg    spironolactone (ALDACTONE) tablet 12.5 mg    cephalexin (KEFLEX) capsule 500 mg    bupivacaine liposome (EXPAREL)  Scanned to chart 1.3 % injection 266 mg    dextrose 5 % infusion    AMIODarone (PACERONE) tablet 100 mg    metoPROLOL succinate (TOPROL-XL) ER tablet 12.5 mg    midodrine (PROAMATINE) tablet 10 mg    atorvastatin (LIPITOR) tablet 20 mg    enoxaparin (LOVENOX) injection 120 mg    HYDROcodone-acetaminophen (NORCO) 5-325 MG per tablet 1 tablet    ondansetron (ZOFRAN ODT) disintegrating tablet 4 mg    Or    ondansetron (ZOFRAN) injection 4 mg    polyethylene glycol (MIRALAX) packet 17 g    sodium chloride 0.9 % flush bag 25 mL    sodium chloride 0.9 % injection 2 mL    sodium chloride 0.9 % flush bag 25 mL    acetaminophen (TYLENOL) tablet 650 mg    Or    acetaminophen (TYLENOL) suppository 650 mg    morphine injection 2 mg    Potassium Standard Replacement Protocol (Levels 3.5 and lower)    Magnesium Standard Replacement Protocol    Phosphorus Standard Replacement Protocol    traMADol (ULTRAM) tablet 50 mg    docusate sodium (COLACE) capsule 100 mg       Imaging    XR CHEST AP OR PA   Final Result by Ahsan Whitfield MD (05/03 0949)   RESULTS/IMPRESSION: The left subclavian pacer is in position. Again noted   are mildly enlarged cardiac size, ectatic aorta, and pulmonary congestion.   There is small linear groundglass opacity in the left lower lung. No   pneumothorax is seen. Small bilateral pleural effusions are visualized.            Electronically Signed by: AHSAN WHITFIELD MD    Signed on: 5/3/2024 9:49 AM    Workstation ID: [id number]      XR CHEST PA OR AP 1 VIEW   Final Result by London Will MD (05/02 1036)      Severe cardiomegaly with mild pulmonary vascular congestion and trace   bilateral pleural effusions. No pneumothorax.            Electronically Signed by: LONDON WILL M.D.    Signed on: 5/2/2024 10:36 AM    Workstation ID: [id number]            Urinary Catheter and Central Lines         Assessment & Plan   All the following conditions PRESENT ON ADMISSION.         Left lateral thoracotomy with left pericardial lead  placement and revision and upgrade of BiV pacemaker  s/p Epicardial LV lead placement and attachment on 5/2  CV surgery on board  Electrophysiology on board  Continue with pain management  Chest tube management per CV surgery  On Keflex     Permanent atrial fibrillation  Tachybradycardia syndrome  History of ventricular tachycardia  Continue with amiodarone  Currently on 120 mg of Lovenox daily  Will transition to p.o. anticoagulation likely tomorrow per EP  D/w with Dr Hastings     Severe TR  Cardiology plan for ZEV today    History of CAD  Nonischemic cardiomyopathy  Acute on Chronic HFrEF  LORRI Clot   Monitor I's and O's  BNP 4598  Continue with atorvastatin metoprolol succinate  Continue with IV Bumex 1mg TID   Cardiology on board     Orthostatic hypotension  Continue with midodrine 10 mg twice daily    Dyslipidemia  Continue statin     Thrombocytopenia  CTM     DVT Prophylaxis  Subcutaneous Lovenox 120 mg daily   SCD    Code Status  Full Code     Primary Care Physician  Lobo Moss MD    IP Consult Orders (From admission, onward)       Start     Ordered    05/03/24 0925  Inpatient consult Heart Failure Clinic  ONE TIME        Provider:  Sheryl Chavez RN    05/03/24 0925    05/02/24 1209  Inpatient consult to Cardiology  ONE TIME        Provider:  Roger Hastings MD    05/02/24 1208                    I have discussed and educated the patient regarding treatment plan. I have discussed with RN and other consultants at length regarding treatment plan.     Greater than 55 minutes spent on patient care .  Time spent reviewing data labs and imaging, direct patient care, collaboration with care team       Luc Reveles MD  [facility] Hospitalist   Please page through [employer]   [phone]

## 2024-05-24 ENCOUNTER — TELEPHONE (OUTPATIENT)
Dept: FAMILY MEDICINE CLINIC | Facility: CLINIC | Age: 34
End: 2024-05-24

## 2024-05-28 ENCOUNTER — OFFICE VISIT (OUTPATIENT)
Dept: FAMILY MEDICINE CLINIC | Facility: CLINIC | Age: 34
End: 2024-05-28

## 2024-05-28 VITALS
HEIGHT: 62 IN | WEIGHT: 191 LBS | HEART RATE: 86 BPM | OXYGEN SATURATION: 99 % | TEMPERATURE: 98.8 F | DIASTOLIC BLOOD PRESSURE: 66 MMHG | SYSTOLIC BLOOD PRESSURE: 101 MMHG | RESPIRATION RATE: 20 BRPM | BODY MASS INDEX: 35.15 KG/M2

## 2024-05-28 DIAGNOSIS — H10.13 ALLERGIC CONJUNCTIVITIS OF BOTH EYES: Primary | ICD-10-CM

## 2024-05-28 PROCEDURE — 99213 OFFICE O/P EST LOW 20 MIN: CPT | Performed by: FAMILY MEDICINE

## 2024-05-28 RX ORDER — LORATADINE 10 MG/1
10 TABLET ORAL DAILY
Qty: 30 TABLET | Refills: 1 | Status: SHIPPED | OUTPATIENT
Start: 2024-05-28 | End: 2024-07-27

## 2024-05-28 NOTE — PROGRESS NOTES
"Ambulatory Visit  Name: Yamileth Stroud      : 1990      MRN: [id number]  Encounter Provider: Alexandre Mora DO  Encounter Date: 2024   Encounter department: [facility]    Assessment & Plan   1. Allergic conjunctivitis of both eyes  Assessment & Plan:  - Reported having seasonal allergy symptoms, specifically eye redness b/l. Pt has not taking any OTC med and want prescription for it  - No fever, no cough nor other breathing concern   Plan  -Prescribe her some Claritin  -Discussed about frequent cleaning bedsheet and pillowcase to get rid of pollen and allergens    Orders:  -     loratadine (CLARITIN) 10 mg tablet; Take 1 tablet (10 mg total) by mouth daily       History of Present Illness     HPI  33 years old female present today with symptoms bilateral eye redness.  Patient reported every time during spring to early summer she developed pain bilateral eye redness, this years she developing symptoms that her symptoms which she has bilateral eye redness, denies discharge, denies any sickness no blurry vision.  She has not tried any oral medication, so far she did eyewash and eyedrops which offer minimal relief.   Review of Systems   Constitutional:  Negative for chills and fever.   HENT:  Negative for ear pain and sore throat.    Eyes:  Positive for redness and itching. Negative for pain and visual disturbance.   Respiratory:  Negative for cough and shortness of breath.    Cardiovascular:  Negative for chest pain and palpitations.   Gastrointestinal:  Negative for abdominal pain and vomiting.   Genitourinary:  Negative for dysuria and hematuria.   Musculoskeletal:  Negative for arthralgias and back pain.   Skin:  Negative for color change and rash.   Neurological:  Negative for seizures and syncope.   All other systems reviewed and are negative.      Objective     /66   Pulse 86   Temp 98.8 °F (37.1 °C) (Temporal)   Resp 20   Ht 5' 2\" (1.575 m)   Wt 86.6 " kg (191 lb)   SpO2 99%   BMI 34.93 kg/m²     Physical Exam  Vitals and nursing note reviewed.   Constitutional:       General: She is not in acute distress.     Appearance: She is well-developed.   HENT:      Head: Normocephalic and atraumatic.   Eyes:      Conjunctiva/sclera: Conjunctivae normal.   Cardiovascular:      Rate and Rhythm: Normal rate and regular rhythm.      Heart sounds: No murmur heard.  Pulmonary:      Effort: Pulmonary effort is normal. No respiratory distress.      Breath sounds: Normal breath sounds.   Abdominal:      Palpations: Abdomen is soft.      Tenderness: There is no abdominal tenderness.   Musculoskeletal:         General: No swelling.      Cervical back: Neck supple.   Skin:     General: Skin is warm and dry.      Capillary Refill: Capillary refill takes less than 2 seconds.   Neurological:      Mental Status: She is alert.   Psychiatric:         Mood and Affect: Mood normal.       Administrative Statements

## 2024-05-28 NOTE — ASSESSMENT & PLAN NOTE
- Reported having seasonal allergy symptoms, specifically eye redness b/l. Pt has not taking any OTC med and want prescription for it  - No fever, no cough nor other breathing concern   Plan  -Prescribe her some Claritin  -Discussed about frequent cleaning bedsheet and pillowcase to get rid of pollen and allergens

## 2024-06-27 PROBLEM — H10.13 ALLERGIC CONJUNCTIVITIS OF BOTH EYES: Status: RESOLVED | Noted: 2024-05-28 | Resolved: 2024-06-27

## 2024-09-23 ENCOUNTER — TELEPHONE (OUTPATIENT)
Dept: OTHER | Facility: OTHER | Age: 34
End: 2024-09-23

## 2024-09-23 NOTE — TELEPHONE ENCOUNTER
Patient is calling regarding cancelling an appointment.    Date/Time:    Patient was rescheduled: YES [] NO [x]    Patient requesting call back to reschedule: YES [x] NO []

## 2024-11-20 ENCOUNTER — HOSPITAL ENCOUNTER (EMERGENCY)
Facility: HOSPITAL | Age: 34
Discharge: HOME | End: 2024-11-20
Attending: EMERGENCY MEDICINE | Admitting: EMERGENCY MEDICINE
Payer: COMMERCIAL

## 2024-11-20 ENCOUNTER — APPOINTMENT (EMERGENCY)
Dept: RADIOLOGY | Facility: HOSPITAL | Age: 34
End: 2024-11-20
Payer: COMMERCIAL

## 2024-11-20 VITALS
RESPIRATION RATE: 22 BRPM | DIASTOLIC BLOOD PRESSURE: 72 MMHG | TEMPERATURE: 97.8 F | SYSTOLIC BLOOD PRESSURE: 146 MMHG | HEART RATE: 68 BPM | OXYGEN SATURATION: 100 %

## 2024-11-20 DIAGNOSIS — R07.9 CHEST PAIN: Primary | ICD-10-CM

## 2024-11-20 DIAGNOSIS — M94.0 COSTOCHONDRITIS: ICD-10-CM

## 2024-11-20 LAB
ATRIAL RATE: 74 BPM
CARDIAC TROPONIN I PNL SERPL HS: <2 NG/L (ref ?–50)
P AXIS: 50 DEGREES
PR INTERVAL: 156 MS
QRS AXIS: 67 DEGREES
QRSD INTERVAL: 84 MS
QT INTERVAL: 390 MS
QTC INTERVAL: 433 MS
T WAVE AXIS: 53 DEGREES
VENTRICULAR RATE: 74 BPM

## 2024-11-20 PROCEDURE — 93010 ELECTROCARDIOGRAM REPORT: CPT | Performed by: INTERNAL MEDICINE

## 2024-11-20 PROCEDURE — 99285 EMERGENCY DEPT VISIT HI MDM: CPT

## 2024-11-20 PROCEDURE — 93005 ELECTROCARDIOGRAM TRACING: CPT

## 2024-11-20 PROCEDURE — 36415 COLL VENOUS BLD VENIPUNCTURE: CPT

## 2024-11-20 PROCEDURE — 71046 X-RAY EXAM CHEST 2 VIEWS: CPT

## 2024-11-20 PROCEDURE — 84484 ASSAY OF TROPONIN QUANT: CPT

## 2024-11-20 PROCEDURE — 99285 EMERGENCY DEPT VISIT HI MDM: CPT | Performed by: EMERGENCY MEDICINE

## 2024-11-20 PROCEDURE — 96374 THER/PROPH/DIAG INJ IV PUSH: CPT

## 2024-11-20 RX ORDER — ACETAMINOPHEN 325 MG/1
650 TABLET ORAL ONCE
Status: COMPLETED | OUTPATIENT
Start: 2024-11-20 | End: 2024-11-20

## 2024-11-20 RX ORDER — KETOROLAC TROMETHAMINE 30 MG/ML
15 INJECTION, SOLUTION INTRAMUSCULAR; INTRAVENOUS ONCE
Status: COMPLETED | OUTPATIENT
Start: 2024-11-20 | End: 2024-11-20

## 2024-11-20 RX ADMIN — ACETAMINOPHEN 650 MG: 325 TABLET, FILM COATED ORAL at 00:46

## 2024-11-20 RX ADMIN — KETOROLAC TROMETHAMINE 15 MG: 30 INJECTION, SOLUTION INTRAMUSCULAR; INTRAVENOUS at 00:45

## 2024-11-20 NOTE — ED ATTENDING ATTESTATION
2024  IAllan MD, saw and evaluated the patient. I have discussed the patient with the resident/non-physician practitioner and agree with the resident's/non-physician practitioner's findings, Plan of Care, and MDM as documented in the resident's/non-physician practitioner's note, except where noted. All available labs and Radiology studies were reviewed.  I was present for key portions of any procedure(s) performed by the resident/non-physician practitioner and I was immediately available to provide assistance.       At this point I agree with the current assessment done in the Emergency Department.  I have conducted an independent evaluation of this patient a history and physical is as follows:      Final Diagnosis:  1. Chest pain    2. Costochondritis      Chief Complaint   Patient presents with    Chest Pain     Pt. C/o substernal chest pain that started at 2000 and radiates down the right arm. +SOB.            A:  -34-year-old female who presents with chest pain.      P:  -Likely costochondritis.  Check EKG/troponin to evaluate for ACS.  Check chest x-ray to evaluate for pneumonia, pneumothorax, pulmonary edema.    HEART score:    History 0=Slightly or non-suspicious   ECG 0=Normal   Age 0= < 45 years   Risk Factors 0= No risk factors known   Troponin 0= Less than or equal to 12 ng/L   Total 0             H:    34-year-old female who presents with chest pain.  Started 4 hours prior to arrival after she got to an argument with her son.  Pain is located in the center of her chest.  Made worse with deep inspiration.  No nausea/vomiting, diaphoresis, exertional component.  Has never experienced similar symptoms.      PMH:  Past Medical History:   Diagnosis Date    Anemia     Asthma        PSH:  Past Surgical History:   Procedure Laterality Date     SECTION      4 times    CHOLECYSTECTOMY LAPAROSCOPIC N/A 2020    Procedure: CHOLECYSTECTOMY LAPAROSCOPIC;  Surgeon: Gilles Hunt;   Location: [facility];  Service: General    GASTRECTOMY SLEEVE LAPAROSCOPIC           PE:   Vitals:    11/20/24 0015 11/20/24 0045   BP: 130/62 146/72   BP Location: Left arm Left arm   Pulse: 76 68   Resp: 18 22   Temp: 97.8 °F (36.6 °C)    TempSrc: Temporal    SpO2: 100% 100%         Constitutional: Vital signs are normal. She appears well-developed. She is cooperative. No distress.   HENT:   Mouth/Throat: Uvula is midline, oropharynx is clear and moist and mucous membranes are normal.   Eyes: Pupils are equal, round, and reactive to light. Conjunctivae and EOM are normal.   Neck: Trachea normal. No thyroid mass and no thyromegaly present.   Cardiovascular: Normal rate, regular rhythm, normal heart sounds.   No murmur heard.  Pulmonary/Chest: Effort normal and breath sounds normal.  Tenderness over anterior chest wall.  Abdominal: Soft. Normal appearance and bowel sounds are normal. There is no tenderness. There is no rebound, no guarding.   Neurological: She is alert.   Skin: Skin is warm, dry and intact.   Psychiatric: She has a normal mood and affect. Her speech is normal and behavior is normal. Thought content normal.          - 13 point ROS was performed and all are normal unless stated in the history above.   - Nursing note reviewed. Vitals reviewed.   - Orders placed by myself and/or advanced practitioner / resident.    - Previous chart was reviewed  - No language barrier.   - History obtained from patient.   - There are no limitations to the history obtained. Reasons ROS could not be obtained:  N/A         Medications   ketorolac (TORADOL) injection 15 mg (15 mg Intravenous Given 11/20/24 0045)   acetaminophen (TYLENOL) tablet 650 mg (650 mg Oral Given 11/20/24 0046)     XR chest 2 views   ED Interpretation   No acute cardiopulmonary disease as interpreted by myself.        Orders Placed This Encounter   Procedures    XR chest 2 views    HS Troponin 0hr (reflex protocol)    ECG 12 lead     Labs Reviewed  "  HS TROPONIN I 0HR - Normal       Result Value Ref Range Status    hs TnI 0hr <2  \"Refer to ACS Flowchart\"- see link ng/L Final    Comment:                                              Initial (time 0) result  If >=50 ng/L, Myocardial injury suggested ;  Type of myocardial injury and treatment strategy  to be determined.  If 5-49 ng/L, a delta result at 2 hours and or 4 hours will be needed to further evaluate.  If <4 ng/L, and chest pain has been >3 hours since onset, patient may qualify for discharge based on the HEART score in the ED.  If <5 ng/L and <3hours since onset of chest pain, a delta result at 2 hours will be needed to further evaluate.    HS Troponin 99th Percentile URL of a Health Population=12 ng/L with a 95% Confidence Interval of 8-18 ng/L.    Second Troponin (time 2 hours)  If calculated delta >= 20 ng/L,  Myocardial injury suggested ; Type of myocardial injury and treatment strategy to be determined.  If 5-49 ng/L and the calculated delta is 5-19 ng/L, consult medical service for evaluation.  Continue evaluation for ischemia on ecg and other possible etiology and repeat hs troponin at 4 hours.  If delta is <5 ng/L at 2 hours, consider discharge based on risk stratification via the HEART score (if in ED), or KEVIN risk score in IP/Observation.    HS Troponin 99th Percentile URL of a Health Population=12 ng/L with a 95% Confidence Interval of 8-18 ng/L.     Time reflects when diagnosis was documented in both MDM as applicable and the Disposition within this note       Time User Action Codes Description Comment    11/20/2024  1:49 AM Faheem Vivas Add [R07.9] Chest pain     11/20/2024  1:49 AM Faheem Vivas Add [M94.0] Costochondritis           ED Disposition       ED Disposition   Discharge    Condition   Stable    Date/Time   Wed Nov 20, 2024  1:49 AM    Comment   Yamileth Stroud discharge to home/self care.                   Follow-up Information    None       Discharge Medication List as of " 11/20/2024  1:51 AM        CONTINUE these medications which have NOT CHANGED    Details   acetaminophen (TYLENOL) 325 mg tablet Take 2 tablets (650 mg total) by mouth every 6 (six) hours as needed for mild pain, Starting Fri 7/24/2020, Normal      albuterol (PROVENTIL HFA,VENTOLIN HFA) 90 mcg/act inhaler Inhale 2 puffs every 6 (six) hours as needed for shortness of breath, Historical Med      Caplyta 42 MG CAPS capsule TAKE 1 CAPSULE BY MOUTH AT BEDTIME**PA FAXED, Historical Med      cloNIDine (CATAPRES) 0.1 mg tablet Take 0.1 mg by mouth 2 (two) times a day, Starting Wed 3/8/2023, Historical Med      clotrimazole (LOTRIMIN) 1 % cream Apply topically 2 (two) times a day, Starting Fri 3/22/2024, Normal      clotrimazole-betamethasone (LOTRISONE) 1-0.05 % cream Apply topically 2 (two) times a day, Starting Thu 1/6/2022, Normal      ferrous sulfate 325 (65 Fe) mg tablet TAKE 1 TABLET BY MOUTH EVERY DAY WITH BREAKFAST, Normal      hydrOXYzine pamoate (VISTARIL) 50 mg capsule Take 50 mg by mouth daily at bedtime, Starting Tue 2/7/2023, Historical Med      loratadine (CLARITIN) 10 mg tablet Take 1 tablet (10 mg total) by mouth daily, Starting Tue 5/28/2024, Until Sat 7/27/2024, Normal      !! medroxyPROGESTERone (DEPO-PROVERA) 150 mg/mL injection Inject 1 mL (150 mg total) into a muscle every 3 (three) months, Starting Tue 1/23/2024, Phone In      !! medroxyPROGESTERone acetate (DEPO-PROVERA SYRINGE) 150 mg/mL injection INJECT 1ML INTRAMUSCULARLY EVERY 3 MONTHS AS DIRECTED, Historical Med      naproxen (Naprosyn) 500 mg tablet Take 1 tablet (500 mg total) by mouth 2 (two) times a day with meals for 7 days, Starting Tue 10/24/2023, Until Tue 10/31/2023, Normal      prazosin (MINIPRESS) 2 mg capsule Take 2 mg by mouth daily at bedtime, Starting Tue 2/7/2023, Historical Med      senna-docusate sodium (SENOKOT S) 8.6-50 mg per tablet Take 1 tablet by mouth daily, Starting Thu 3/2/2023, Normal      sertraline (ZOLOFT) 50 mg  tablet Take 50 mg by mouth every morning, Starting Tue 2/7/2023, Historical Med      tamsulosin (FLOMAX) 0.4 mg Take 1 capsule (0.4 mg total) by mouth daily with dinner for 14 days, Starting Tue 10/24/2023, Until Tue 11/7/2023, Normal      topiramate (TOPAMAX) 25 mg tablet Starting Mon 5/23/2022, Historical Med      Tranexamic Acid 650 MG TABS Historical Med       !! - Potential duplicate medications found. Please discuss with provider.        No discharge procedures on file.  Prior to Admission Medications   Prescriptions Last Dose Informant Patient Reported? Taking?   Caplyta 42 MG CAPS capsule   Yes No   Sig: TAKE 1 CAPSULE BY MOUTH AT BEDTIME**PA FAXED   Tranexamic Acid 650 MG TABS   Yes No   Patient not taking: Reported on 1/24/2024   acetaminophen (TYLENOL) 325 mg tablet  Self No No   Sig: Take 2 tablets (650 mg total) by mouth every 6 (six) hours as needed for mild pain   albuterol (PROVENTIL HFA,VENTOLIN HFA) 90 mcg/act inhaler  Self Yes No   Sig: Inhale 2 puffs every 6 (six) hours as needed for shortness of breath   cloNIDine (CATAPRES) 0.1 mg tablet   Yes No   Sig: Take 0.1 mg by mouth 2 (two) times a day   clotrimazole (LOTRIMIN) 1 % cream   No No   Sig: Apply topically 2 (two) times a day   clotrimazole-betamethasone (LOTRISONE) 1-0.05 % cream  Self No No   Sig: Apply topically 2 (two) times a day   Patient not taking: Reported on 10/25/2023   ferrous sulfate 325 (65 Fe) mg tablet   No No   Sig: TAKE 1 TABLET BY MOUTH EVERY DAY WITH BREAKFAST   Patient not taking: Reported on 1/24/2024   hydrOXYzine pamoate (VISTARIL) 50 mg capsule   Yes No   Sig: Take 50 mg by mouth daily at bedtime   loratadine (CLARITIN) 10 mg tablet   No No   Sig: Take 1 tablet (10 mg total) by mouth daily   medroxyPROGESTERone (DEPO-PROVERA) 150 mg/mL injection   No No   Sig: Inject 1 mL (150 mg total) into a muscle every 3 (three) months   Patient not taking: Reported on 5/28/2024   medroxyPROGESTERone acetate (DEPO-PROVERA  "SYRINGE) 150 mg/mL injection   Yes No   Sig: INJECT 1ML INTRAMUSCULARLY EVERY 3 MONTHS AS DIRECTED   Patient not taking: Reported on 5/28/2024   naproxen (Naprosyn) 500 mg tablet   No No   Sig: Take 1 tablet (500 mg total) by mouth 2 (two) times a day with meals for 7 days   prazosin (MINIPRESS) 2 mg capsule   Yes No   Sig: Take 2 mg by mouth daily at bedtime   senna-docusate sodium (SENOKOT S) 8.6-50 mg per tablet   No No   Sig: Take 1 tablet by mouth daily   sertraline (ZOLOFT) 50 mg tablet   Yes No   Sig: Take 50 mg by mouth every morning   tamsulosin (FLOMAX) 0.4 mg   No No   Sig: Take 1 capsule (0.4 mg total) by mouth daily with dinner for 14 days   topiramate (TOPAMAX) 25 mg tablet  Self Yes No      Facility-Administered Medications Last Administration Doses Remaining   medroxyPROGESTERone (DEPO-PROVERA) IM injection 150 mg 1/24/2024  1:18 PM           Portions of the record may have been created with voice recognition software. Occasional wrong word or \"sound a like\" substitutions may have occurred due to the inherent limitations of voice recognition software. Read the chart carefully and recognize, using context, where substitutions have occurred.       ED Course         Critical Care Time  Procedures      "

## 2024-11-20 NOTE — DISCHARGE INSTRUCTIONS
Please follow up with your family doctor. Please return to the emergency department if you develop crushing chest pain that does not resolve, or you develop shortness of breath or vision changes. Thank you for choosing [facility].

## 2024-11-20 NOTE — ED PROVIDER NOTES
Time reflects when diagnosis was documented in both MDM as applicable and the Disposition within this note       Time User Action Codes Description Comment    11/20/2024  1:49 AM Faheem Vivas Add [R07.9] Chest pain     11/20/2024  1:49 AM Faheem Vivas Add [M94.0] Costochondritis           ED Disposition       ED Disposition   Discharge    Condition   Stable    Date/Time   Wed Nov 20, 2024  1:49 AM    Comment   Yamileth M Maximus discharge to home/self care.                   Assessment & Plan       Medical Decision Making  ASSESSMENT: Patient is a 34 y.o. female who presents with chest pain, right arm pain.   DDX includes but not limited to: ACS, costochondritis, anxiety induced chest pain, pneumonia, pneumothorax, pneumomediastinum.  PE was considered, however according to PERC rule, less concerned at this time.  PLAN: ECG, chest x-ray, troponin. Treated with Toradol, Tylenol.     ECG-see ED course for full interpretation, no acute findings concerning for ACS  Troponin-unremarkable, less concern for ACS.    Patient's symptoms improved following Toradol and Tylenol.    Patient is stable for discharge with chest pain, likely secondary to costochondritis.  Patient provided strong return precautions.  Patient encouraged to follow-up with her PCP for further evaluation.  Patient is understanding and agreeable to plan.  Patient stable for discharge.    Amount and/or Complexity of Data Reviewed  Labs: ordered. Decision-making details documented in ED Course.  Radiology: ordered and independent interpretation performed. Decision-making details documented in ED Course.  ECG/medicine tests:  Decision-making details documented in ED Course.    Risk  OTC drugs.  Prescription drug management.        ED Course as of 11/20/24 0401   Wed Nov 20, 2024   0143 hs TnI 0hr: <2   0143 ECG 12 lead  Procedure Note: EKG  Date/Time: 11/20/24 1:43 AM   Interpreted by:Faheem Vivas  Indications / Diagnosis: Chest pain  ECG reviewed by me,  the ED Provider: yes   The EKG demonstrates:  Rate: 70-80  Rhythm: normal sinus  Intervals: normal  Axis: normal  QRS/Blocks: normal  ST Changes: No acute ST Changes, no STD/LUCHO.  Compared to prior EKG on 07/21/20, unchanged.     0144 XR chest 2 views  No acute cardiopulmonary disease.   0149 Patient's pain improved.       Medications   ketorolac (TORADOL) injection 15 mg (15 mg Intravenous Given 11/20/24 0045)   acetaminophen (TYLENOL) tablet 650 mg (650 mg Oral Given 11/20/24 0046)       ED Risk Strat Scores                       PERC Rule for PE      Flowsheet Row Most Recent Value   PERC Rule for PE    Age >=50 0 Filed at: 11/20/2024 0151   HR >=100 0 Filed at: 11/20/2024 0151   O2 Sat on room air < 95% 0 Filed at: 11/20/2024 0151   History of PE or DVT 0 Filed at: 11/20/2024 0151   Recent trauma or surgery 0 Filed at: 11/20/2024 0151   Hemoptysis 0 Filed at: 11/20/2024 0151   Exogenous estrogen 0 Filed at: 11/20/2024 0151   Unilateral leg swelling 0 Filed at: 11/20/2024 0151   PERC Rule for PE Results 0 Filed at: 11/20/2024 0151            SBIRT 20yo+      Flowsheet Row Most Recent Value   Initial Alcohol Screen: US AUDIT-C     1. How often do you have a drink containing alcohol? 0 Filed at: 11/20/2024 0016   2. How many drinks containing alcohol do you have on a typical day you are drinking?  0 Filed at: 11/20/2024 0016   3b. FEMALE Any Age, or MALE 65+: How often do you have 4 or more drinks on one occassion? 0 Filed at: 11/20/2024 0016   Audit-C Score 0 Filed at: 11/20/2024 0016   SEVERINO: How many times in the past year have you...    Used an illegal drug or used a prescription medication for non-medical reasons? Never Filed at: 11/20/2024 0016                            History of Present Illness       Chief Complaint   Patient presents with    Chest Pain     Pt. C/o substernal chest pain that started at 2000 and radiates down the right arm. +SOB.        Past Medical History:   Diagnosis Date    Anemia      Asthma       Past Surgical History:   Procedure Laterality Date     SECTION      4 times    CHOLECYSTECTOMY LAPAROSCOPIC N/A 2020    Procedure: CHOLECYSTECTOMY LAPAROSCOPIC;  Surgeon: Gilles Hunt;  Location: [facility];  Service: General    GASTRECTOMY SLEEVE LAPAROSCOPIC        History reviewed. No pertinent family history.   Social History     Tobacco Use    Smoking status: Never    Smokeless tobacco: Never   Vaping Use    Vaping status: Never Used   Substance Use Topics    Alcohol use: Yes     Comment: socially    Drug use: No      E-Cigarette/Vaping    E-Cigarette Use Never User       E-Cigarette/Vaping Substances    Nicotine No     THC No     CBD No     Flavoring No     Other No       I have reviewed and agree with the history as documented.     Patient is a 34-year-old female with no pertinent past medical history who is presenting to the emergency department for 4 hours of constant sharp chest pain.  She states the pain is located in the center of her chest and is worse with deep inspiration.  She also endorses some mild right arm discomfort.  She denies any shortness of breath, lightheadedness, vision changes, dizziness, diaphoresis.  She denies any nausea, vomiting, diarrhea.  She denies any recent illness, rhinorrhea, cough, fevers, chills.  She denies any leg swelling, calf tenderness.  She denies any recent surgeries, oral contraceptive usage.  Patient has not trialed any OTC medication for the pain.  Patient has no prior cardiac history, endorses possible family history of cardiac events but cannot recall.  Patient is currently on her menstrual period, denies concern for pregnancy.      Chest Pain      Review of Systems   Cardiovascular:  Positive for chest pain.           Objective       ED Triage Vitals [24 0015]   Temperature Pulse Blood Pressure Respirations SpO2 Patient Position - Orthostatic VS   97.8 °F (36.6 °C) 76 130/62 18 100 % Sitting      Temp Source Heart Rate  Source BP Location FiO2 (%) Pain Score    Temporal Monitor Left arm -- 7      Vitals      Date and Time Temp Pulse SpO2 Resp BP Pain Score FACES Pain Rating User   11/20/24 0045 -- 68 100 % 22 146/72 -- -- KS   11/20/24 0034 -- -- -- -- -- 7 -- KS   11/20/24 0015 97.8 °F (36.6 °C) 76 100 % 18 130/62 7 -- BK            Physical Exam  Vitals reviewed.   Constitutional:       General: She is not in acute distress.     Appearance: She is normal weight. She is not toxic-appearing or diaphoretic.   HENT:      Head: Normocephalic and atraumatic.   Eyes:      Extraocular Movements: Extraocular movements intact.      Pupils: Pupils are equal, round, and reactive to light.   Neck:      Vascular: No JVD.   Cardiovascular:      Rate and Rhythm: Normal rate and regular rhythm.      Pulses:           Radial pulses are 2+ on the right side and 2+ on the left side.      Heart sounds: Normal heart sounds.   Pulmonary:      Effort: Pulmonary effort is normal. No tachypnea or respiratory distress.      Breath sounds: Normal breath sounds. No decreased breath sounds, wheezing, rhonchi or rales.   Chest:      Chest wall: Tenderness (Along the sternal border) present.   Abdominal:      General: Bowel sounds are normal.      Palpations: Abdomen is soft.      Tenderness: There is no abdominal tenderness. There is no guarding or rebound.   Musculoskeletal:         General: Normal range of motion.      Cervical back: Normal range of motion.      Right lower leg: No tenderness. No edema.      Left lower leg: No tenderness. No edema.   Skin:     General: Skin is warm and dry.      Capillary Refill: Capillary refill takes less than 2 seconds.      Coloration: Skin is not pale.   Neurological:      General: No focal deficit present.      Mental Status: She is alert and oriented to person, place, and time.   Psychiatric:         Mood and Affect: Mood normal.         Behavior: Behavior normal.         Results Reviewed       Procedure Component  Value Units Date/Time    HS Troponin 0hr (reflex protocol) [[id number]]  (Normal) Collected: 11/20/24 0045    Lab Status: Final result Specimen: Blood from Arm, Right Updated: 11/20/24 0126     hs TnI 0hr <2 ng/L             XR chest 2 views   ED Interpretation by Allan Mckay MD (11/20 [id number])   No acute cardiopulmonary disease as interpreted by myself.      Final Interpretation by Bucky Henson MD (11/20 8672)      No acute cardiopulmonary disease.            Workstation performed: [id number]             Procedures    ED Medication and Procedure Management   Prior to Admission Medications   Prescriptions Last Dose Informant Patient Reported? Taking?   Caplyta 42 MG CAPS capsule   Yes No   Sig: TAKE 1 CAPSULE BY MOUTH AT BEDTIME**PA FAXED   Tranexamic Acid 650 MG TABS   Yes No   Patient not taking: Reported on 1/24/2024   acetaminophen (TYLENOL) 325 mg tablet  Self No No   Sig: Take 2 tablets (650 mg total) by mouth every 6 (six) hours as needed for mild pain   albuterol (PROVENTIL HFA,VENTOLIN HFA) 90 mcg/act inhaler  Self Yes No   Sig: Inhale 2 puffs every 6 (six) hours as needed for shortness of breath   cloNIDine (CATAPRES) 0.1 mg tablet   Yes No   Sig: Take 0.1 mg by mouth 2 (two) times a day   clotrimazole (LOTRIMIN) 1 % cream   No No   Sig: Apply topically 2 (two) times a day   clotrimazole-betamethasone (LOTRISONE) 1-0.05 % cream  Self No No   Sig: Apply topically 2 (two) times a day   Patient not taking: Reported on 10/25/2023   ferrous sulfate 325 (65 Fe) mg tablet   No No   Sig: TAKE 1 TABLET BY MOUTH EVERY DAY WITH BREAKFAST   Patient not taking: Reported on 1/24/2024   hydrOXYzine pamoate (VISTARIL) 50 mg capsule   Yes No   Sig: Take 50 mg by mouth daily at bedtime   loratadine (CLARITIN) 10 mg tablet   No No   Sig: Take 1 tablet (10 mg total) by mouth daily   medroxyPROGESTERone (DEPO-PROVERA) 150 mg/mL injection   No No   Sig: Inject 1 mL (150 mg total) into a muscle every 3 (three) months    Patient not taking: Reported on 5/28/2024   medroxyPROGESTERone acetate (DEPO-PROVERA SYRINGE) 150 mg/mL injection   Yes No   Sig: INJECT 1ML INTRAMUSCULARLY EVERY 3 MONTHS AS DIRECTED   Patient not taking: Reported on 5/28/2024   naproxen (Naprosyn) 500 mg tablet   No No   Sig: Take 1 tablet (500 mg total) by mouth 2 (two) times a day with meals for 7 days   prazosin (MINIPRESS) 2 mg capsule   Yes No   Sig: Take 2 mg by mouth daily at bedtime   senna-docusate sodium (SENOKOT S) 8.6-50 mg per tablet   No No   Sig: Take 1 tablet by mouth daily   sertraline (ZOLOFT) 50 mg tablet   Yes No   Sig: Take 50 mg by mouth every morning   tamsulosin (FLOMAX) 0.4 mg   No No   Sig: Take 1 capsule (0.4 mg total) by mouth daily with dinner for 14 days   topiramate (TOPAMAX) 25 mg tablet  Self Yes No      Facility-Administered Medications Last Administration Doses Remaining   medroxyPROGESTERone (DEPO-PROVERA) IM injection 150 mg 1/24/2024  1:18 PM         Discharge Medication List as of 11/20/2024  1:51 AM        CONTINUE these medications which have NOT CHANGED    Details   acetaminophen (TYLENOL) 325 mg tablet Take 2 tablets (650 mg total) by mouth every 6 (six) hours as needed for mild pain, Starting Fri 7/24/2020, Normal      albuterol (PROVENTIL HFA,VENTOLIN HFA) 90 mcg/act inhaler Inhale 2 puffs every 6 (six) hours as needed for shortness of breath, Historical Med      Caplyta 42 MG CAPS capsule TAKE 1 CAPSULE BY MOUTH AT BEDTIME**FLAQUITO WALTER, Historical Med      cloNIDine (CATAPRES) 0.1 mg tablet Take 0.1 mg by mouth 2 (two) times a day, Starting Wed 3/8/2023, Historical Med      clotrimazole (LOTRIMIN) 1 % cream Apply topically 2 (two) times a day, Starting Fri 3/22/2024, Normal      clotrimazole-betamethasone (LOTRISONE) 1-0.05 % cream Apply topically 2 (two) times a day, Starting Thu 1/6/2022, Normal      ferrous sulfate 325 (65 Fe) mg tablet TAKE 1 TABLET BY MOUTH EVERY DAY WITH BREAKFAST, Normal      hydrOXYzine pamoate  (VISTARIL) 50 mg capsule Take 50 mg by mouth daily at bedtime, Starting Tue 2/7/2023, Historical Med      loratadine (CLARITIN) 10 mg tablet Take 1 tablet (10 mg total) by mouth daily, Starting Tue 5/28/2024, Until Sat 7/27/2024, Normal      !! medroxyPROGESTERone (DEPO-PROVERA) 150 mg/mL injection Inject 1 mL (150 mg total) into a muscle every 3 (three) months, Starting Tue 1/23/2024, Phone In      !! medroxyPROGESTERone acetate (DEPO-PROVERA SYRINGE) 150 mg/mL injection INJECT 1ML INTRAMUSCULARLY EVERY 3 MONTHS AS DIRECTED, Historical Med      naproxen (Naprosyn) 500 mg tablet Take 1 tablet (500 mg total) by mouth 2 (two) times a day with meals for 7 days, Starting Tue 10/24/2023, Until Tue 10/31/2023, Normal      prazosin (MINIPRESS) 2 mg capsule Take 2 mg by mouth daily at bedtime, Starting Tue 2/7/2023, Historical Med      senna-docusate sodium (SENOKOT S) 8.6-50 mg per tablet Take 1 tablet by mouth daily, Starting Thu 3/2/2023, Normal      sertraline (ZOLOFT) 50 mg tablet Take 50 mg by mouth every morning, Starting Tue 2/7/2023, Historical Med      tamsulosin (FLOMAX) 0.4 mg Take 1 capsule (0.4 mg total) by mouth daily with dinner for 14 days, Starting Tue 10/24/2023, Until Tue 11/7/2023, Normal      topiramate (TOPAMAX) 25 mg tablet Starting Mon 5/23/2022, Historical Med      Tranexamic Acid 650 MG TABS Historical Med       !! - Potential duplicate medications found. Please discuss with provider.        No discharge procedures on file.  ED SEPSIS DOCUMENTATION   Time reflects when diagnosis was documented in both MDM as applicable and the Disposition within this note       Time User Action Codes Description Comment    11/20/2024  1:49 AM Faheem Vivas [R07.9] Chest pain     11/20/2024  1:49 AM Faheem Vivas [M94.0] Costochondritis                  Faheem Vivas, DO  11/20/24 0359       Faheem Vivas, DO  11/20/24 0401

## 2024-11-22 ENCOUNTER — OFFICE VISIT (OUTPATIENT)
Dept: FAMILY MEDICINE CLINIC | Facility: CLINIC | Age: 34
End: 2024-11-22

## 2024-11-22 VITALS
SYSTOLIC BLOOD PRESSURE: 107 MMHG | TEMPERATURE: 98 F | DIASTOLIC BLOOD PRESSURE: 69 MMHG | OXYGEN SATURATION: 99 % | HEIGHT: 62 IN | HEART RATE: 88 BPM | BODY MASS INDEX: 38.64 KG/M2 | WEIGHT: 210 LBS

## 2024-11-22 DIAGNOSIS — M94.0 ACUTE COSTOCHONDRITIS: Primary | ICD-10-CM

## 2024-11-22 PROCEDURE — 99213 OFFICE O/P EST LOW 20 MIN: CPT

## 2024-11-22 RX ORDER — LIDOCAINE 4 G/G
1 PATCH TOPICAL DAILY
Qty: 15 PATCH | Refills: 0 | Status: SHIPPED | OUTPATIENT
Start: 2024-11-22

## 2024-11-22 RX ORDER — CYCLOBENZAPRINE HCL 5 MG
5 TABLET ORAL 3 TIMES DAILY PRN
Qty: 10 TABLET | Refills: 0 | Status: SHIPPED | OUTPATIENT
Start: 2024-11-22

## 2024-11-22 NOTE — PROGRESS NOTES
Name: Yamileth Stroud      : 1990      MRN: [id number]  Encounter Provider: Sanjeev Beverly DO  Encounter Date: 2024   [facility]    Assessment & Plan  Acute costochondritis  Chest wall tenderness and pain with deep inspiration in setting of negative ED workup- Negative EKG, CXR, Troponin.     Treat symptomatically. Likely will resolve.    Voltaren gel, lidocaine patch and limited trial of muscle relaxer  Orders:    Diclofenac Sodium (VOLTAREN) 1 %; Apply 2 g topically 4 (four) times a day    Lidocaine (HM Lidocaine Patch) 4 % PTCH; Apply 1 patch topically in the morning    cyclobenzaprine (FLEXERIL) 5 mg tablet; Take 1 tablet (5 mg total) by mouth 3 (three) times a day as needed for muscle spasms         History of Present Illness     Ms. Stroud is a 33 y/o female PMH notable for asthma here today for ED follow-up for sudden chest pain. She was cleared by the ED and did not find evidence of MI. She was discharged stable. She states she still has the pain located in the middle right sternum. The pain is described as a sharp pain. Pressing on the area elicits pain. Deep breaths elicit pain. She was given Ketorolac 15 mg and Tylenol 650 mg which did not help according to the patient. She says the pain occasionally radiates down her right arm. She denies a history of recent URI or cough.       Review of Systems   Constitutional: Negative.    HENT: Negative.     Respiratory:  Negative for shortness of breath.    Cardiovascular:  Positive for chest pain.        With palpation and deep inspiration   Gastrointestinal:  Negative for abdominal pain, constipation and diarrhea.   Genitourinary:  Negative for difficulty urinating and dysuria.     Past Medical History:   Diagnosis Date    Anemia     Asthma      Past Surgical History:   Procedure Laterality Date     SECTION      4 times    CHOLECYSTECTOMY LAPAROSCOPIC N/A 2020     Procedure: CHOLECYSTECTOMY LAPAROSCOPIC;  Surgeon: Gilles Hunt;  Location: [facility];  Service: General    GASTRECTOMY SLEEVE LAPAROSCOPIC       History reviewed. No pertinent family history.  Social History     Tobacco Use    Smoking status: Never    Smokeless tobacco: Never   Vaping Use    Vaping status: Never Used   Substance and Sexual Activity    Alcohol use: Yes     Comment: socially    Drug use: No    Sexual activity: Not Currently     Partners: Male     Current Outpatient Medications on File Prior to Visit   Medication Sig    acetaminophen (TYLENOL) 325 mg tablet Take 2 tablets (650 mg total) by mouth every 6 (six) hours as needed for mild pain    albuterol (PROVENTIL HFA,VENTOLIN HFA) 90 mcg/act inhaler Inhale 2 puffs every 6 (six) hours as needed for shortness of breath    Caplyta 42 MG CAPS capsule TAKE 1 CAPSULE BY MOUTH AT BEDTIME**PA FAXED    cloNIDine (CATAPRES) 0.1 mg tablet Take 0.1 mg by mouth 2 (two) times a day    clotrimazole (LOTRIMIN) 1 % cream Apply topically 2 (two) times a day    clotrimazole-betamethasone (LOTRISONE) 1-0.05 % cream Apply topically 2 (two) times a day    ferrous sulfate 325 (65 Fe) mg tablet TAKE 1 TABLET BY MOUTH EVERY DAY WITH BREAKFAST    hydrOXYzine pamoate (VISTARIL) 50 mg capsule Take 50 mg by mouth daily at bedtime    loratadine (CLARITIN) 10 mg tablet Take 1 tablet (10 mg total) by mouth daily    medroxyPROGESTERone (DEPO-PROVERA) 150 mg/mL injection Inject 1 mL (150 mg total) into a muscle every 3 (three) months    medroxyPROGESTERone acetate (DEPO-PROVERA SYRINGE) 150 mg/mL injection     naproxen (Naprosyn) 500 mg tablet Take 1 tablet (500 mg total) by mouth 2 (two) times a day with meals for 7 days    prazosin (MINIPRESS) 2 mg capsule Take 2 mg by mouth daily at bedtime    senna-docusate sodium (SENOKOT S) 8.6-50 mg per tablet Take 1 tablet by mouth daily    sertraline (ZOLOFT) 50 mg tablet Take 50 mg by mouth every morning    tamsulosin (FLOMAX) 0.4 mg  "Take 1 capsule (0.4 mg total) by mouth daily with dinner for 14 days    topiramate (TOPAMAX) 25 mg tablet     Tranexamic Acid 650 MG TABS      No Known Allergies  Immunization History   Administered Date(s) Administered    COVID-19 PFIZER VACCINE 0.3 ML IM 04/01/2021, 04/22/2021, 02/25/2022    INFLUENZA 11/22/2016, 01/24/2018, 04/01/2019, 02/17/2021    Influenza, seasonal, injectable 10/17/2014, 10/22/2015    Pneumococcal Conjugate Vaccine 20-valent (Pcv20), Polysace 05/23/2022    Tdap 05/23/2022     Objective   /69 (BP Location: Left arm, Patient Position: Sitting, Cuff Size: Standard)   Pulse 88   Temp 98 °F (36.7 °C) (Temporal)   Ht 5' 2\" (1.575 m)   Wt 95.3 kg (210 lb)   SpO2 99%   BMI 38.41 kg/m²     Physical Exam  Constitutional:       General: She is not in acute distress.     Appearance: Normal appearance. She is normal weight.   HENT:      Head: Normocephalic and atraumatic.      Right Ear: External ear normal.      Left Ear: External ear normal.      Nose: Nose normal.      Mouth/Throat:      Pharynx: Oropharynx is clear.   Eyes:      Conjunctiva/sclera: Conjunctivae normal.   Cardiovascular:      Rate and Rhythm: Normal rate and regular rhythm.      Pulses: Normal pulses.   Pulmonary:      Effort: Pulmonary effort is normal.   Abdominal:      Palpations: Abdomen is soft.   Musculoskeletal:      Cervical back: Normal range of motion and neck supple.      Comments: Tenderness to right side of chest wall    Skin:     General: Skin is warm and dry.      Capillary Refill: Capillary refill takes less than 2 seconds.   Neurological:      General: No focal deficit present.      Mental Status: She is alert and oriented to person, place, and time. Mental status is at baseline.   Psychiatric:         Mood and Affect: Mood normal.         Behavior: Behavior normal.         Thought Content: Thought content normal.         Judgment: Judgment normal.         Sanjeev Beverly,   PGY-2 Family Medicine - " [place]   11/22/24, 5:41 PM

## 2024-12-11 ENCOUNTER — TELEPHONE (OUTPATIENT)
Dept: FAMILY MEDICINE CLINIC | Facility: CLINIC | Age: 34
End: 2024-12-11

## 2025-01-30 ENCOUNTER — OFFICE VISIT (OUTPATIENT)
Dept: FAMILY MEDICINE CLINIC | Facility: CLINIC | Age: 35
End: 2025-01-30

## 2025-01-30 VITALS
WEIGHT: 207.4 LBS | HEART RATE: 70 BPM | TEMPERATURE: 97.9 F | HEIGHT: 62 IN | SYSTOLIC BLOOD PRESSURE: 103 MMHG | DIASTOLIC BLOOD PRESSURE: 64 MMHG | BODY MASS INDEX: 38.16 KG/M2 | RESPIRATION RATE: 16 BRPM

## 2025-01-30 DIAGNOSIS — M79.604 BILATERAL LEG PAIN: ICD-10-CM

## 2025-01-30 DIAGNOSIS — Z00.00 ANNUAL PHYSICAL EXAM: Primary | ICD-10-CM

## 2025-01-30 DIAGNOSIS — Z23 ENCOUNTER FOR IMMUNIZATION: ICD-10-CM

## 2025-01-30 DIAGNOSIS — E61.1 IRON DEFICIENCY: ICD-10-CM

## 2025-01-30 DIAGNOSIS — M79.605 BILATERAL LEG PAIN: ICD-10-CM

## 2025-01-30 PROCEDURE — 99395 PREV VISIT EST AGE 18-39: CPT | Performed by: FAMILY MEDICINE

## 2025-01-30 PROCEDURE — 90656 IIV3 VACC NO PRSV 0.5 ML IM: CPT

## 2025-01-30 PROCEDURE — 90471 IMMUNIZATION ADMIN: CPT

## 2025-01-30 PROCEDURE — 99213 OFFICE O/P EST LOW 20 MIN: CPT | Performed by: FAMILY MEDICINE

## 2025-01-30 RX ORDER — GABAPENTIN 100 MG/1
100 CAPSULE ORAL 3 TIMES DAILY
Qty: 90 CAPSULE | Refills: 1 | Status: SHIPPED | OUTPATIENT
Start: 2025-01-30

## 2025-01-30 NOTE — ASSESSMENT & PLAN NOTE
Pt states she is doing well   - Yearly labs ordered   - Colonoscopy: not due   - PAP/HPV: done in 2023 NILM and HPV Neg   - Mammogram: Not due   - HIV and Hep C screenings: done last year WNL  - Vaccinations: flu shot given today   - Depression screening: PHQ score 0 (01/30/25),  - Counseled the patient on healthy lifestyle habits including weight loss, diet, and exercise.     Orders:    Comprehensive metabolic panel; Future    CBC and differential; Future    Iron Panel (Includes Ferritin, Iron Sat%, Iron, and TIBC); Future    TSH w/Reflex; Future    Vitamin D 25 hydroxy; Future    Lipid panel; Future

## 2025-01-30 NOTE — PROGRESS NOTES
Adult Annual Physical  Name: Yamileth Stroud      : 1990      MRN: [id number]  Encounter Provider: Sanjeev Beverly DO  Encounter Date: 2025   Encounter department: [facility]    Assessment & Plan  Annual physical exam  Pt states she is doing well   - Yearly labs ordered   - Colonoscopy: not due   - PAP/HPV: done in  NILM and HPV Neg   - Mammogram: Not due   - HIV and Hep C screenings: done last year WNL  - Vaccinations: flu shot given today   - Depression screening: PHQ score 0 (25),  - Counseled the patient on healthy lifestyle habits including weight loss, diet, and exercise.     Orders:    Comprehensive metabolic panel; Future    CBC and differential; Future    Iron Panel (Includes Ferritin, Iron Sat%, Iron, and TIBC); Future    TSH w/Reflex; Future    Vitamin D 25 hydroxy; Future    Lipid panel; Future    Bilateral leg pain  This is a chronic problem for the patient.  She describes a multiple month history of bilateral leg pain that is intermittent but persistent when it occurs.  She describes the pain as occurring on the frontal aspects of her bilateral tibias and is exacerbated at the end of the day after walking all day.  She states also intermittently that she does have pain more exacerbated when putting weight on her legs.  Also states when the pain starts she feels a sharp pain in her back which radiates down and then the pain occurs.  She states she does have a history of sciatica and has done physical therapy in the past, however presents to the office today and does not have this pain.  On my exam, she does not have any tibial tuberosity tenderness or any tenderness at the joint line or distal to the tuberosity.  There is no soft tissue swelling or concerns for obstructive or erythematous process. Nothing really helps the pain other than sometimes soaking in a warm water ba.  Ibuprofen or Tylenol does not help the pain.      Her  description of the pain is a little tough to pin down however I do suspect she may have an element of neuropathic pain and we will trial her on some gabapentin.  Also worth update her yearly labs.  Check iron panel B12 vitamin D.  Also reasonable to retrial physical therapy to see if she can alleviate the occurrence of these flares.  Check plain films.  Will follow-up in 1 month      Orders:    XR tibia fibula 2 vw left; Future    XR tibia fibula 2 vw right; Future    Comprehensive metabolic panel; Future    CBC and differential; Future    Iron Panel (Includes Ferritin, Iron Sat%, Iron, and TIBC); Future    Vitamin B12; Future    Vitamin D 25 hydroxy; Future    gabapentin (NEURONTIN) 100 mg capsule; Take 1 capsule (100 mg total) by mouth 3 (three) times a day    Ambulatory Referral to Physical Therapy; Future    Iron deficiency         Encounter for immunization  Flu shot given today   Orders:    influenza vaccine preservative-free 0.5 mL IM (Fluzone, Afluria, Fluarix, Flulaval)    BMI 38.0-38.9,adult    Orders:    Lipid panel; Future    Immunizations and preventive care screenings were discussed with patient today. Appropriate education was printed on patient's after visit summary.    Counseling:  Alcohol/drug use: discussed moderation in alcohol intake, the recommendations for healthy alcohol use, and avoidance of illicit drug use.    BMI Counseling: Body mass index is 38.55 kg/m². The BMI is above normal. Nutrition recommendations include decreasing portion sizes. Exercise recommendations include moderate physical activity 150 minutes/week. Rationale for BMI follow-up plan is due to patient being overweight or obese.         History of Present Illness         Adult Annual Physical:  Patient presents for annual physical. Has concerns about leg pain otherwise doing well..     Diet and Physical Activity:  - Diet/Nutrition: well balanced diet.  - Exercise: walking.    Depression Screening:  - PHQ-2 Score:  0    General Health:  - Sleep: 7-8 hours of sleep on average.  - Hearing: normal hearing bilateral ears.  - Vision: no vision problems.  - Dental: regular dental visits.    /GYN Health:  - Follows with GYN: yes.   - Menopause: premenopausal.   - History of STDs: no    Review of Systems   Constitutional:  Negative for fatigue, fever and unexpected weight change.   HENT:  Negative for congestion, sinus pressure, sinus pain and sore throat.    Eyes:  Negative for visual disturbance.   Respiratory:  Negative for cough, chest tightness, shortness of breath and wheezing.    Cardiovascular:  Negative for chest pain, palpitations and leg swelling.   Gastrointestinal:  Negative for abdominal pain, constipation, diarrhea, nausea and vomiting.   Endocrine: Negative for polydipsia, polyphagia and polyuria.   Genitourinary:  Negative for difficulty urinating, dysuria and urgency.   Musculoskeletal:  Positive for back pain. Negative for arthralgias.        Leg pain   Skin:  Negative for rash.   Neurological:  Negative for dizziness, weakness, light-headedness, numbness and headaches.   Psychiatric/Behavioral:  Negative for behavioral problems and confusion. The patient is not nervous/anxious.      Current Outpatient Medications on File Prior to Visit   Medication Sig Dispense Refill    acetaminophen (TYLENOL) 325 mg tablet Take 2 tablets (650 mg total) by mouth every 6 (six) hours as needed for mild pain 30 tablet 0    albuterol (PROVENTIL HFA,VENTOLIN HFA) 90 mcg/act inhaler Inhale 2 puffs every 6 (six) hours as needed for shortness of breath      Caplyta 42 MG CAPS capsule TAKE 1 CAPSULE BY MOUTH AT BEDTIME**PA FAXED      cloNIDine (CATAPRES) 0.1 mg tablet Take 0.1 mg by mouth 2 (two) times a day      cyclobenzaprine (FLEXERIL) 5 mg tablet Take 1 tablet (5 mg total) by mouth 3 (three) times a day as needed for muscle spasms 10 tablet 0    Diclofenac Sodium (VOLTAREN) 1 % Apply 2 g topically 4 (four) times a day 240 g 1     ferrous sulfate 325 (65 Fe) mg tablet TAKE 1 TABLET BY MOUTH EVERY DAY WITH BREAKFAST 90 tablet 2    hydrOXYzine pamoate (VISTARIL) 50 mg capsule Take 50 mg by mouth daily at bedtime      Lidocaine (HM Lidocaine Patch) 4 % PTCH Apply 1 patch topically in the morning 15 patch 0    prazosin (MINIPRESS) 2 mg capsule Take 2 mg by mouth daily at bedtime      senna-docusate sodium (SENOKOT S) 8.6-50 mg per tablet Take 1 tablet by mouth daily 90 tablet 0    sertraline (ZOLOFT) 50 mg tablet Take 50 mg by mouth every morning      topiramate (TOPAMAX) 25 mg tablet       loratadine (CLARITIN) 10 mg tablet Take 1 tablet (10 mg total) by mouth daily 30 tablet 1    naproxen (Naprosyn) 500 mg tablet Take 1 tablet (500 mg total) by mouth 2 (two) times a day with meals for 7 days 14 tablet 0    tamsulosin (FLOMAX) 0.4 mg Take 1 capsule (0.4 mg total) by mouth daily with dinner for 14 days 14 capsule 0    [DISCONTINUED] clotrimazole (LOTRIMIN) 1 % cream Apply topically 2 (two) times a day (Patient not taking: Reported on 1/30/2025) 14 g 1    [DISCONTINUED] clotrimazole-betamethasone (LOTRISONE) 1-0.05 % cream Apply topically 2 (two) times a day (Patient not taking: Reported on 1/30/2025) 30 g 0    [DISCONTINUED] medroxyPROGESTERone (DEPO-PROVERA) 150 mg/mL injection Inject 1 mL (150 mg total) into a muscle every 3 (three) months (Patient not taking: Reported on 1/30/2025) 1 mL 1    [DISCONTINUED] medroxyPROGESTERone acetate (DEPO-PROVERA SYRINGE) 150 mg/mL injection  (Patient not taking: Reported on 1/30/2025)      [DISCONTINUED] Tranexamic Acid 650 MG TABS  (Patient not taking: Reported on 1/30/2025)       Current Facility-Administered Medications on File Prior to Visit   Medication Dose Route Frequency Provider Last Rate Last Admin    medroxyPROGESTERone (DEPO-PROVERA) IM injection 150 mg  150 mg Intramuscular Q3 Months Loree Han DO   150 mg at 01/24/24 1318        Objective   /64 (BP Location: Left arm, Patient  "Position: Sitting, Cuff Size: Large)   Pulse 70   Temp 97.9 °F (36.6 °C)   Resp 16   Ht 5' 1.5\" (1.562 m)   Wt 94.1 kg (207 lb 6.4 oz)   BMI 38.55 kg/m²     Physical Exam  Constitutional:       General: She is not in acute distress.     Appearance: Normal appearance. She is normal weight.   HENT:      Head: Normocephalic and atraumatic.      Right Ear: Tympanic membrane, ear canal and external ear normal.      Left Ear: Tympanic membrane, ear canal and external ear normal.      Nose: Nose normal.      Mouth/Throat:      Pharynx: Oropharynx is clear.   Eyes:      General: No scleral icterus.     Conjunctiva/sclera: Conjunctivae normal.   Cardiovascular:      Rate and Rhythm: Normal rate and regular rhythm.      Pulses: Normal pulses.      Heart sounds: Normal heart sounds. No murmur heard.     No friction rub. No gallop.   Pulmonary:      Effort: Pulmonary effort is normal. No respiratory distress.      Breath sounds: Normal breath sounds. No stridor. No wheezing, rhonchi or rales.   Abdominal:      General: Abdomen is flat.      Palpations: Abdomen is soft.   Musculoskeletal:         General: No swelling, tenderness or signs of injury. Normal range of motion.      Cervical back: Normal range of motion and neck supple.      Right lower leg: No edema.      Left lower leg: No edema.   Skin:     General: Skin is warm and dry.      Capillary Refill: Capillary refill takes less than 2 seconds.   Neurological:      Mental Status: She is alert and oriented to person, place, and time. Mental status is at baseline.   Psychiatric:         Mood and Affect: Mood normal.         Behavior: Behavior normal.         Thought Content: Thought content normal.         Judgment: Judgment normal.         Sanjeev Beverly DO  PGY-2 Family Medicine - [place]   01/30/25, 5:24 PM    "

## 2025-01-30 NOTE — PATIENT INSTRUCTIONS
"Patient Education     Routine physical for adults   The Basics   Written by the doctors and editors at [facility]   What is a physical? -- A physical is a routine visit, or \"check-up,\" with your doctor. You might also hear it called a \"wellness visit\" or \"preventive visit.\"  During each visit, the doctor will:   Ask about your physical and mental health   Ask about your habits, behaviors, and lifestyle   Do an exam   Give you vaccines if needed   Talk to you about any medicines you take   Give advice about your health   Answer your questions  Getting regular check-ups is an important part of taking care of your health. It can help your doctor find and treat any problems you have. But it's also important for preventing health problems.  A routine physical is different from a \"sick visit.\" A sick visit is when you see a doctor because of a health concern or problem. Since physicals are scheduled ahead of time, you can think about what you want to ask the doctor.  How often should I get a physical? -- It depends on your age and health. In general, for people age 21 years and older:   If you are younger than 50 years, you might be able to get a physical every 3 years.   If you are 50 years or older, your doctor might recommend a physical every year.  If you have an ongoing health condition, like diabetes or high blood pressure, your doctor will probably want to see you more often.  What happens during a physical? -- In general, each visit will include:   Physical exam - The doctor or nurse will check your height, weight, heart rate, and blood pressure. They will also look at your eyes and ears. They will ask about how you are feeling and whether you have any symptoms that bother you.   Medicines - It's a good idea to bring a list of all the medicines you take to each doctor visit. Your doctor will talk to you about your medicines and answer any questions. Tell them if you are having any side effects that bother you. You " "should also tell them if you are having trouble paying for any of your medicines.   Habits and behaviors - This includes:   Your diet   Your exercise habits   Whether you smoke, drink alcohol, or use drugs   Whether you are sexually active   Whether you feel safe at home  Your doctor will talk to you about things you can do to improve your health and lower your risk of health problems. They will also offer help and support. For example, if you want to quit smoking, they can give you advice and might prescribe medicines. If you want to improve your diet or get more physical activity, they can help you with this, too.   Lab tests, if needed - The tests you get will depend on your age and situation. For example, your doctor might want to check your:   Cholesterol   Blood sugar   Iron level   Vaccines - The recommended vaccines will depend on your age, health, and what vaccines you already had. Vaccines are very important because they can prevent certain serious or deadly infections.   Discussion of screening - \"Screening\" means checking for diseases or other health problems before they cause symptoms. Your doctor can recommend screening based on your age, risk, and preferences. This might include tests to check for:   Cancer, such as breast, prostate, cervical, ovarian, colorectal, prostate, lung, or skin cancer   Sexually transmitted infections, such as chlamydia and gonorrhea   Mental health conditions like depression and anxiety  Your doctor will talk to you about the different types of screening tests. They can help you decide which screenings to have. They can also explain what the results might mean.   Answering questions - The physical is a good time to ask the doctor or nurse questions about your health. If needed, they can refer you to other doctors or specialists, too.  Adults older than 65 years often need other care, too. As you get older, your doctor will talk to you about:   How to prevent falling at " home   Hearing or vision tests   Memory testing   How to take your medicines safely   Making sure that you have the help and support you need at home  All topics are updated as new evidence becomes available and our peer review process is complete.  This topic retrieved from [employer] on: May 02, 2024.  Topic 539181 Version 1.0  Release: 32.4.3 - C32.122  © 2024 UpToDate, Inc. and/or its affiliates. All rights reserved.  Consumer Information Use and Disclaimer   Disclaimer: This generalized information is a limited summary of diagnosis, treatment, and/or medication information. It is not meant to be comprehensive and should be used as a tool to help the user understand and/or assess potential diagnostic and treatment options. It does NOT include all information about conditions, treatments, medications, side effects, or risks that may apply to a specific patient. It is not intended to be medical advice or a substitute for the medical advice, diagnosis, or treatment of a health care provider based on the health care provider's examination and assessment of a patient's specific and unique circumstances. Patients must speak with a health care provider for complete information about their health, medical questions, and treatment options, including any risks or benefits regarding use of medications. This information does not endorse any treatments or medications as safe, effective, or approved for treating a specific patient. UpToDate, Inc. and its affiliates disclaim any warranty or liability relating to this information or the use thereof.The use of this information is governed by the Terms of Use, available at https://www.woltersFramebenchuwer.com/en/know/clinical-effectiveness-terms. 2024© UpToDate, Inc. and its affiliates and/or licensors. All rights reserved.  Copyright   © 2024 UpToDate, Inc. and/or its affiliates. All rights reserved.    Patient Education     Diet and health   The Basics   Written by the doctors and  "editors at [employer]   Why is it important to eat a healthy diet? -- It's important to eat a healthy diet because eating the right foods can keep you healthy now and later in life. It can lower the risk of problems like heart disease, diabetes, high blood pressure, and some types of cancer. It can also help you live longer and improve your quality of life.  What kind of diet is best? -- There is no 1 specific diet that experts recommend for everyone. People choose what foods to eat for many different reasons. These include personal preference, culture, Muslim, allergies or intolerances, and nutritional goals. People also need to consider the cost and availability of different foods.  In general, experts recommend a diet that:   Includes lots of vegetables, fruits, beans, nuts, and whole grains   Limits red and processed meats, unhealthy fats, sugar, salt, and alcohol  What are dietary patterns? -- A dietary \"pattern\" means generally eating certain types of foods while limiting others. Some people need to follow a specific dietary pattern because of their health needs. For example, if you have high blood pressure, your doctor might recommend a diet low in salt.  If you are trying to improve your health in general, choosing a healthy dietary pattern can help. This does not have to mean being very strict about what you eat or avoid. The goal is to think about getting plenty of healthy foods while limiting less healthy foods.  Examples of dietary patterns include:   Mediterranean diet - This involves eating a lot of fruits, vegetables, nuts, and whole grains, and uses olive oil instead of other fats. It also includes some fish, poultry, and dairy products, but not a lot of red meat. Following this diet can help your overall health, and might even lower your risk of having a stroke.   Plant-based diets - These patterns focus on vegetables, fruits, grains, beans, and nuts. They limit or avoid food that comes from " "animals, such as meat and dairy. There are different types of plant-based diets, including vegetarian and vegan.   Low-fat diet - A low-fat diet involves limiting calories from fat. This might help some people keep weight off if that is their goal, but it does not have many other health benefits. If you choose to follow a low-fat diet, it is also important to focus on getting lots of whole grains, legumes, fruits, and vegetables. Limit refined grains and sugar.   Low-cholesterol diet - Cholesterol is found in foods with a lot of saturated fat, like red meat, butter, and cheese. A low-cholesterol diet focuses on limiting the amount of cholesterol that you eat. Limiting the cholesterol in your diet can also help lower the amount of unhealthy fats that you eat.  Which foods are especially healthy? -- Foods that are especially healthy include:   Fruits and vegetables - Eating a diet with lots of fruits and vegetables can help prevent heart disease and stroke. It might also help prevent certain types of cancer. Try to eat fruits and vegetables at each meal and also for snacks. If you don't have fresh fruits and vegetables available, you can eat frozen or canned ones instead. Doctors recommend eating at least 5 servings of fruits or vegetables each day.   Whole grains - Whole-grain foods include 100 percent whole-wheat bread, steel cut oats, and whole-grain pasta. These are healthier than foods made with \"refined\" grains, like white bread and white rice. Eating lots of whole grains instead of refined grains has been shown to help with weight control. It can also lower the risk of several health problems, including colon cancer, heart disease, and diabetes. Doctors recommend that most people try to eat 5 to 8 servings of whole-grain, high-fiber foods each day.   Foods with fiber - Eating foods with a lot of fiber can help prevent heart disease and stroke. If you have type 2 diabetes, it can also help control your blood " "sugar. Foods that have a lot of fiber include vegetables, fruits, beans, nuts, oatmeal, and whole-grain breads and cereals. You can tell how much fiber is in a food by reading the nutrition label (figure 1). Doctors recommend that most people eat about 25 to 34 grams of fiber each day.   Foods with calcium and vitamin D - Babies, children, and adults need calcium and vitamin D to help keep their bones strong. Adults also need calcium and vitamin D to help prevent osteoporosis. Osteoporosis is a condition that causes bones to get \"thin\" and break more easily than usual. Different foods and drinks have calcium and vitamin D in them (figure 2). People who don't get enough calcium and vitamin D in their diet might need to take a supplement. Doctors recommend that most people have 2 to 3 servings of foods with calcium and vitamin D each day.   Foods with protein - Protein helps your muscles and bones stay strong. Healthy foods with a lot of protein include chicken, fish, eggs, beans, nuts, and soy products. Red meat also has a lot of protein, but it also contains fats, which can be unhealthy. Doctors recommend that most people try to eat about 5 servings of protein each day.   Healthy fats - There are different types of fats. Some types of fats are better for your body than others. Healthy fats are \"monounsaturated\" or \"polyunsaturated\" fats. These are found in fatty fish, nuts and nut butters, and avocados. Use plant-based oils when cooking. Examples of these oils include olive, canola, safflower, sunflower, and corn oil. Eating foods with healthy fats, while avoiding or limiting foods with unhealthy fats, might lower the risk of heart disease.   Foods with folate - Folate is a vitamin that is important for pregnant people, since it helps prevent certain birth defects. It is also called \"folic acid.\" Anyone who could get pregnant should get at least 400 micrograms of folic acid daily, whether or not they are actively " "trying to get pregnant. Folate is found in many breakfast cereals, oranges, orange juice, and green leafy vegetables.  What foods should I avoid or limit? -- To eat a healthy diet, there are some things that you should avoid or limit. They include:   Unhealthy fats - \"Trans\" fats are especially unhealthy. They are found in margarines, many fast foods, and some store-bought baked goods. \"Saturated\" fats are found in animal products like meats, egg yolks, butter, cheese, and full-fat milk products. Unhealthy fats can raise your cholesterol level and increase your chance of getting heart disease.   Sugar - To have a healthy diet, it's important to limit or avoid added sugar, sweets, and refined grains. Refined grains are found in white bread, white rice, most pastas, and most packaged \"snack\" foods.  Avoiding sugar-sweetened beverages, like soda and sports drinks, can also help improve your health.  Avoid canned fruits in \"heavy\" syrup.   Red and processed meats - Studies have shown that eating a lot of red meat can increase your risk of certain health problems, including heart disease and cancer. You should limit the amount of red meat that you eat. This is also true for processed meats like sausage, hot dogs, and kelly.  Can I drink alcohol as part of a healthy diet? -- Not drinking alcohol at all is the healthiest choice. Regular drinking can raise a person's chances of getting liver disease and certain types of cancers. In females, even 1 drink a day can increase the risk of getting breast cancer.  If you do choose to drink, most doctors recommend limiting alcohol to no more than:   1 drink a day for females   2 drinks a day for males  The limits are different because, generally, the female body takes longer to break down alcohol.  How many calories do I need each day? -- Calories give your body energy. The number of calories that you need each day depends on your weight, height, age, sex, and how active you " "are.  Your doctor or nurse can tell you about how many calories you should eat each day. You can also work with a dietitian (nutrition expert) to learn more about your dietary needs and options.  What if I am having trouble improving my diet? -- It can be hard to change the way that you eat. Remember that even small changes can improve your health.  Here are some tips that might help:   Try to make fruits and vegetables part of every meal. If you don't have fresh fruits and vegetables, frozen or canned are good options. Look for products without added salt or sugar.   Keep a bowl of fruit out for snacking.   When you can, choose whole grains instead of refined grains. Choose chicken, fish, and beans instead of red meat and cheese.   Try to eat prepared and processed foods less often.   Try flavored seltzer or water instead of soda or juice.   When eating at fast food restaurants, look for healthier items, like broiled chicken or salad.  If you have questions about which foods you should or should not eat, ask your doctor, nurse, or dietitian. The right diet for you will depend, in part, on your health and any medical conditions you have.  All topics are updated as new evidence becomes available and our peer review process is complete.  This topic retrieved from [facility] on: Feb 28, 2024.  Topic 26570 Version 28.0  Release: 32.2.4 - C32.58  © 2024 UpToDate, Inc. and/or its affiliates. All rights reserved.  figure 1: Nutrition label - Fiber     This is an example of a nutrition label. To figure out how much fiber is in a food, look for the line that says \"Dietary Fiber.\" It's also important to look at the serving size. This food has 7 grams of fiber in each serving, and each serving is 1 cup.  Graphic 84032 Version 8.0  figure 2: Foods and drinks with calcium and vitamin D     Foods rich in calcium include ice cream, soy milk, breads, kale, broccoli, milk, cheese, cottage cheese, almonds, yogurt, ready-to-eat cereals, " "beans, and tofu. Foods rich in vitamin D include milk, fortified plant-based \"milks\" (soy, almond), canned tuna fish, cod liver oil, yogurt, ready-to-eat-cereals, cooked salmon, canned sardines, mackerel, and eggs. Some of these foods are rich in both.  Graphic 23036 Version 4.0  Consumer Information Use and Disclaimer   Disclaimer: This generalized information is a limited summary of diagnosis, treatment, and/or medication information. It is not meant to be comprehensive and should be used as a tool to help the user understand and/or assess potential diagnostic and treatment options. It does NOT include all information about conditions, treatments, medications, side effects, or risks that may apply to a specific patient. It is not intended to be medical advice or a substitute for the medical advice, diagnosis, or treatment of a health care provider based on the health care provider's examination and assessment of a patient's specific and unique circumstances. Patients must speak with a health care provider for complete information about their health, medical questions, and treatment options, including any risks or benefits regarding use of medications. This information does not endorse any treatments or medications as safe, effective, or approved for treating a specific patient. [employer]Date, Inc[employer] and its affiliates disclaim any warranty or liability relating to this information or the use thereof.The use of this information is governed by the Terms of Use, available at https://www.woltersFashionQlubuwer.com/en/know/clinical-effectiveness-terms. 2024© Up[employer]Date, Inc. and its affiliates and/or licensors. All rights reserved.  Copyright   © 2024 Up[employer]Date, Inc. and/or its affiliates. All rights reserved.    "

## 2025-03-03 DIAGNOSIS — Z00.6 ENCOUNTER FOR EXAMINATION FOR NORMAL COMPARISON OR CONTROL IN CLINICAL RESEARCH PROGRAM: ICD-10-CM

## 2025-03-05 ENCOUNTER — EVALUATION (OUTPATIENT)
Dept: PHYSICAL THERAPY | Age: 35
End: 2025-03-05
Payer: COMMERCIAL

## 2025-03-05 DIAGNOSIS — M79.604 BILATERAL LEG PAIN: Primary | ICD-10-CM

## 2025-03-05 DIAGNOSIS — M79.605 BILATERAL LEG PAIN: Primary | ICD-10-CM

## 2025-03-05 PROCEDURE — 97110 THERAPEUTIC EXERCISES: CPT

## 2025-03-05 PROCEDURE — 97161 PT EVAL LOW COMPLEX 20 MIN: CPT

## 2025-03-05 NOTE — PROGRESS NOTES
PT Evaluation     Today's date: 3/5/2025  Patient name: Yamileth Stroud  : 1990  MRN: [id number]  Referring provider: Kera Toure DO  Dx:   Encounter Diagnosis     ICD-10-CM    1. Bilateral leg pain  M79.604     M79.605                      Assessment  Impairments: abnormal gait, abnormal or restricted ROM, impaired balance, impaired physical strength, lacks appropriate home exercise program, pain with function and poor posture   Symptom irritability: low    Assessment details: Pt is a 34 y.o. female who presents to OP PT with chief c/o b/l anterior lower leg pain. Signs and symptoms indicate probable diagnosis of L5-S1 lumbar radiculopathy with extension movement preference. she has primary impairments of decreased lumbar mobility/ROM, decreased LE strength, decreased neural mobility, decreased hip/knee flexibility, decreased trunk/core strength, and increased pain. She is limited functionally as she has difficulty with prolonged ambulation, prolonged standing, prolonged sitting, performing usual ADL's, and participating in usual recreational activities. Pt performed standing lumbar extension and peroneal nerve glides which improved her symptoms. Provided pt with HEP handout of these exercises and educated her on proper completion of HEP, normal response to exercises, diagnosis, and POC. she verbalizes understanding of all education provided. All questions answered. Pt would benefit from skilled OP PT in order to improve upon impairments and return to PLOF.  Understanding of Dx/Px/POC: good     Prognosis: good    Goals  Short term goals  Pt will improve strength by 1/2 grade in order to perform ADL's within 2-3 weeks   Pt will be able to bend down to  object off of the floor with a 50% reduction in symptoms within 2-3 weeks  Pt will be able to stand for >30 minutes with a 50% reduction in symptoms within 2-3 weeks    Long term goals  Pt will be I with advanced HEP or symptom management   Pt  "will be able to perform all ADL's with <3/10 pain  Pt will report 90% functional improvement  Pt will be able to tolerate all leisure activities (pushing/pulling/lifting) with <3/10 pain  Pt will be able to tolerate prolonged sitting/standing/walking with <3/10 pain  Pt will improve FOTO >/= expected          Plan  Patient would benefit from: skilled physical therapy    Planned therapy interventions: patient education, therapeutic exercise, graded exercise, functional ROM exercises, flexibility, home exercise program, manual therapy, activity modification, strengthening, stretching, joint mobilization, massage, therapeutic activities, balance/weight bearing training, neuromuscular re-education, patient/caregiver education, nerve gliding and gait training    Frequency: 2x week  Duration in weeks: 4  Treatment plan discussed with: patient        Subjective Evaluation    History of Present Illness  Mechanism of injury: Pt reports that she has had b/l leg pain for about 3-4 months. It's not the whole leg. From her knee to the ankle. Both legs hurt about the same amount. Pt reports that it is really uncomfortable and reports it as \"stabbing.\" And sometimes it feels like it is being \"squeezed.\" Sometimes she wakes up feeling the pain, other days she wakes up okay but then later in the day and lasts for about a couple of weeks and then subsides a little bit. She has not had any testing. Sometimes she will get up and walk which will ease the pain a bit but once she rests it comes back pretty strong. She has tried doing ankle pumps and moving her legs when laying down but it does not help. She reports she stays hydrated daily. Pt notes some weakness in her legs and she has difficulty kneeling and getting back up whereas before she didn't have any difficulty with that. She notes that it sometimes feels like her legs get swollen and sometimes has black and blue spots.     Easing factors: movement, walking, warmth/heat  Agg " factors: nothing specific  Pain  Current pain rating: 3  At best pain rating: 3  At worst pain rating: 10  Quality: squeezing, sharp and throbbing  Relieving factors: heat  Aggravating factors: sitting    Social Support  Steps to enter house: yes  Stairs in house: yes     Treatments  Current treatment: physical therapy        Objective     Active Range of Motion     Lumbar   Flexion:  Restriction level: minimal  Extension:  Restriction level: moderate  Left lateral flexion:  Restriction level: moderate  Right lateral flexion:  Restriction level: moderate  Left rotation:  Restriction level: moderate  Right rotation:  Restriction level: moderate    Additional Active Range of Motion Details  Lumbar AROM (% of normal AROM):  Flex: 90%  L lat flex: 50%  R lat flex: 50%  L rot: 50%  R rot: 50%  Ext: 50%    Joint Play     Pain: L5 and S1   Mechanical Assessment    Cervical      Thoracic      Lumbar    Sitting flexion: repeated movements  Pain location: peripheralized  Pain level: produced  Standing extension: repeated movements  Pain location: centralized  Pain level: decreased  Lying extension: repeated movements  Pain location: peripheralized  Pain level: produced    Tests     Lumbar     Left   Positive passive SLR.   Negative femoral stretch and slump test.     Right   Positive passive SLR.   Negative slump test.     Left Pelvic Girdle/Sacrum   Negative: thigh thrust.     Right Pelvic Girdle/Sacrum   Negative: thigh thrust.     Left Hip   Negative ALAN and FADIR.     Right Hip   Negative ALAN and FADIR.              Precautions: Universal      Manuals 3/5            Lumbar CPA grade III-IV             L/S strabilization                                       Neuro Re-Ed                                                                                                        Ther Ex             Nu step for LE strength/ROM             Standing lumbar extension 2x10             Sit-to-stand             Step ups              H/S stretch             Piriformis stretch             Fibular nerve glides 2x10            SLR's 3 way's on plinth             Seated LAQ             Cybex leg press             Cybex hip abd                                                                                                        Ther Activity                                       Gait Training                                       Modalities

## 2025-03-14 ENCOUNTER — OFFICE VISIT (OUTPATIENT)
Dept: PHYSICAL THERAPY | Age: 35
End: 2025-03-14
Payer: COMMERCIAL

## 2025-03-14 DIAGNOSIS — M79.605 BILATERAL LEG PAIN: Primary | ICD-10-CM

## 2025-03-14 DIAGNOSIS — M79.604 BILATERAL LEG PAIN: Primary | ICD-10-CM

## 2025-03-14 PROCEDURE — 97110 THERAPEUTIC EXERCISES: CPT

## 2025-03-14 NOTE — PROGRESS NOTES
"Daily Note     Today's date: 3/14/2025  Patient name: Yamileth Stroud  : 1990  MRN: [id number]  Referring provider: Kera Toure DO  Dx:   Encounter Diagnosis     ICD-10-CM    1. Bilateral leg pain  M79.604     M79.605                      Subjective: Pt reports her low back and mid back are sore and has some pain when doing standing extension but her knee/leg pain is not as bad.       Objective: See treatment diary below      Assessment: Pt's radicular symptoms improving with extension movement preference of lumbar spine. Incorporated LE strengthening and stretches to supplement lumbar mobility exercises. Applied MH to lumbar spine while pt was in static prone on elbows which she tolerated well. Overall reduction in symptoms post-tx. Tolerated treatment well. Patient demonstrated fatigue post treatment and exhibited good technique with therapeutic exercises      Plan: Continue per plan of care.  Progress treatment as tolerated.       Precautions: Universal      Manuals 3/5 3/14           Lumbar CPA grade III-IV  NJR           L/S strabilization                                       Neuro Re-Ed                                                                                                        Ther Ex             Nu step for LE strength/ROM  10'           Standing lumbar extension 2x10  2x10            CARLOS  10' w/ MH            Sit-to-stand  2x10           Step ups  2x10 6\"           H/S stretch  30\"x3           Piriformis stretch  30\"x3           Fibular nerve glides 2x10 2x10            SLR's 3 way's on plinth  2x10 flex           Seated LAQ  NT           Cybex leg press  NT           Cybex hip abd  NT                                                                                                      Ther Activity                                       Gait Training                                       Modalities                                            "

## 2025-03-19 ENCOUNTER — OFFICE VISIT (OUTPATIENT)
Dept: PHYSICAL THERAPY | Age: 35
End: 2025-03-19
Payer: COMMERCIAL

## 2025-03-19 DIAGNOSIS — M79.604 BILATERAL LEG PAIN: Primary | ICD-10-CM

## 2025-03-19 DIAGNOSIS — M79.605 BILATERAL LEG PAIN: Primary | ICD-10-CM

## 2025-03-19 PROCEDURE — 97110 THERAPEUTIC EXERCISES: CPT

## 2025-03-19 NOTE — PROGRESS NOTES
"Daily Note     Today's date: 3/19/2025  Patient name: Yamileth Stroud  : 1990  MRN: [id number]  Referring provider: Kera Toure DO  Dx:   Encounter Diagnosis     ICD-10-CM    1. Bilateral leg pain  M79.604     M79.605                      Subjective: Pt reports that her pain is about the same. Notes some tingling in the bottom of her feet.       Objective: See treatment diary below      Assessment: Pt responds well to standing lumbar extension to improve posture, mobility, and reduce symptoms. Challenged by LE strengthening exercises. Unable to affect b/l foot n/t this session. Continue to monitor. Tolerated treatment fair. Patient demonstrated fatigue post treatment, exhibited good technique with therapeutic exercises, and would benefit from continued PT      Plan: Continue per plan of care.  Progress treatment as tolerated.       Precautions: Universal      Manuals 3/5 3/14 3/19          Lumbar CPA grade III-IV  NJR NJR          L/S strabilization                                       Neuro Re-Ed                                                                                                        Ther Ex             Nu step for LE strength/ROM  10' 10'           Standing lumbar extension 2x10  2x10  2x10          CARLOS  10' w/ MH  10' w/ MH          Sit-to-stand  2x10 2x10           Step ups  2x10 6\" 2x10 6\"          H/S stretch  30\"x3 30\"x3          Piriformis stretch  30\"x3 30\"x3          Fibular nerve glides 2x10 2x10  2x10           SLR's 3 way's on plinth  2x10 flex 2x10 flex          Seated LAQ  NT           Cybex leg press  NT           Cybex hip abd  NT                                                                                                      Ther Activity                                       Gait Training                                       Modalities                                              "

## 2025-03-21 ENCOUNTER — APPOINTMENT (OUTPATIENT)
Dept: PHYSICAL THERAPY | Age: 35
End: 2025-03-21
Payer: COMMERCIAL

## 2025-03-28 ENCOUNTER — APPOINTMENT (OUTPATIENT)
Dept: PHYSICAL THERAPY | Age: 35
End: 2025-03-28
Payer: COMMERCIAL

## 2025-04-01 ENCOUNTER — HOSPITAL ENCOUNTER (EMERGENCY)
Facility: HOSPITAL | Age: 35
Discharge: HOME | End: 2025-04-01
Attending: EMERGENCY MEDICINE
Payer: COMMERCIAL

## 2025-04-01 VITALS
TEMPERATURE: 97.6 F | DIASTOLIC BLOOD PRESSURE: 63 MMHG | OXYGEN SATURATION: 98 % | RESPIRATION RATE: 18 BRPM | SYSTOLIC BLOOD PRESSURE: 113 MMHG | HEART RATE: 69 BPM

## 2025-04-01 DIAGNOSIS — K64.5 THROMBOSED EXTERNAL HEMORRHOID: Primary | ICD-10-CM

## 2025-04-01 PROCEDURE — 99284 EMERGENCY DEPT VISIT MOD MDM: CPT | Performed by: EMERGENCY MEDICINE

## 2025-04-01 PROCEDURE — 99282 EMERGENCY DEPT VISIT SF MDM: CPT

## 2025-04-01 PROCEDURE — 46320 REMOVAL OF HEMORRHOID CLOT: CPT | Performed by: EMERGENCY MEDICINE

## 2025-04-01 RX ORDER — POLYETHYLENE GLYCOL 3350 17 G/17G
17 POWDER, FOR SOLUTION ORAL DAILY
Qty: 510 G | Refills: 0 | Status: SHIPPED | OUTPATIENT
Start: 2025-04-01

## 2025-04-01 RX ORDER — LIDOCAINE 50 MG/G
OINTMENT TOPICAL 2 TIMES DAILY PRN
Qty: 50 G | Refills: 0 | Status: SHIPPED | OUTPATIENT
Start: 2025-04-01

## 2025-04-01 RX ORDER — LIDOCAINE HYDROCHLORIDE 20 MG/ML
1 JELLY TOPICAL ONCE
Status: COMPLETED | OUTPATIENT
Start: 2025-04-01 | End: 2025-04-01

## 2025-04-01 RX ORDER — AMOXICILLIN 250 MG
1 CAPSULE ORAL DAILY
Qty: 30 TABLET | Refills: 0 | Status: SHIPPED | OUTPATIENT
Start: 2025-04-01 | End: 2025-04-01

## 2025-04-01 RX ORDER — LIDOCAINE 50 MG/G
OINTMENT TOPICAL 2 TIMES DAILY PRN
Qty: 50 G | Refills: 1 | Status: SHIPPED | OUTPATIENT
Start: 2025-04-01 | End: 2025-04-01

## 2025-04-01 RX ORDER — ROPIVACAINE HYDROCHLORIDE 5 MG/ML
15 INJECTION, SOLUTION EPIDURAL; INFILTRATION; PERINEURAL ONCE
Status: DISCONTINUED | OUTPATIENT
Start: 2025-04-01 | End: 2025-04-01

## 2025-04-01 RX ORDER — LIDOCAINE HYDROCHLORIDE AND EPINEPHRINE 10; 10 MG/ML; UG/ML
10 INJECTION, SOLUTION INFILTRATION; PERINEURAL ONCE
Status: COMPLETED | OUTPATIENT
Start: 2025-04-01 | End: 2025-04-01

## 2025-04-01 RX ORDER — POLYETHYLENE GLYCOL 3350 17 G/17G
17 POWDER, FOR SOLUTION ORAL DAILY
Qty: 510 G | Refills: 0 | Status: SHIPPED | OUTPATIENT
Start: 2025-04-01 | End: 2025-04-01

## 2025-04-01 RX ORDER — AMOXICILLIN 250 MG
1 CAPSULE ORAL DAILY
Qty: 30 TABLET | Refills: 0 | Status: SHIPPED | OUTPATIENT
Start: 2025-04-01

## 2025-04-01 RX ADMIN — LIDOCAINE HYDROCHLORIDE 1 APPLICATION: 20 JELLY TOPICAL at 14:21

## 2025-04-01 RX ADMIN — LIDOCAINE HYDROCHLORIDE,EPINEPHRINE BITARTRATE 10 ML: 10; .01 INJECTION, SOLUTION INFILTRATION; PERINEURAL at 14:23

## 2025-04-01 NOTE — DISCHARGE INSTRUCTIONS
You have been evaluated in the Emergency Department today for external hemorrhoids. Your evaluation was not suggestive of any emergent condition requiring medical intervention at this time; however, some problems may take more time to appear. Therefore, it is important for you to watch for any new symptoms or worsening of your current condition.     Please follow up with your primary care physician/colorectal surgeon as soon as possible.    You have been prescribed a bowel regimen to be taken daily to prevent constipation. Do not spend more than five minutes on the toilet. Do not strain to have a bowel movement. You can put the lidocaine ointment on your hemorrhoids at least 30 minutes prior to having a bowel movement to help relieve pain.    Return to the Emergency Department if you experience worsening of current symptoms, severe/worsening abdominal pain, development of new or persistent fevers greater than 100.4F despite antipyretics, recurrent/uncontrollable vomiting, blood in vomit, blood in stool, inability to eat/drink or go to the bathroom, chest pain, difficulty breathing, severe headache/dizziness, or any other new/concerning symptoms.

## 2025-04-01 NOTE — ED ATTENDING ATTESTATION
4/1/2025  I, Jd Roberson DO, saw and evaluated the patient. I have discussed the patient with the resident/non-physician practitioner and agree with the resident's/non-physician practitioner's findings, Plan of Care, and MDM as documented in the resident's/non-physician practitioner's note, except where noted. All available labs and Radiology studies were reviewed.  I was present for key portions of any procedure(s) performed by the resident/non-physician practitioner and I was immediately available to provide assistance.       At this point I agree with the current assessment done in the Emergency Department.  I have conducted an independent evaluation of this patient a history and physical is as follows:    ED Course  ED Course as of 04/01/25 1425   Tue Apr 01, 2025   1423 34-year-old female presents emergency department with noted external hemorrhoids.  Patient has a history of this in the past states that yesterday evening started having significant worsening pain at the 10 o'clock position, noted thrombosed hemorrhoid in that area, other protruding external hemorrhoids noted, no evidence of thrombosis at this point in time, differential diagnosis includes external hemorrhoids, thrombosed hemorrhoids, internal hemorrhoids, anal prolapse    Plan to be for removal of thrombosed hemorrhoid at this point in time in the emergency department with elliptical incision and injection of lidocaine with epinephrine.  Local colorectal for further management.         Critical Care Time  Procedures

## 2025-04-01 NOTE — ED PROVIDER NOTES
Time reflects when diagnosis was documented in both MDM as applicable and the Disposition within this note       Time User Action Codes Description Comment    4/1/2025  2:50 PM Caroline Shen [K64.5] Thrombosed external hemorrhoid           ED Disposition       ED Disposition   Discharge    Condition   Stable    Date/Time   Tue Apr 1, 2025  2:50 PM    Comment   Yamileth Stroud discharge to home/self care.                   Assessment & Plan       Medical Decision Making  ASSESSMENT: Patient is a 34 y.o. female who presents with external hemorrhoids, one thrombosed, with rectal pain.   DDX includes but not limited to: thrombosed hemorrhoids, rectal pain, rectal bleeding 2/2 hemorrhoids.   PLAN: Will excise hemorrhoid (see procedure note). Treated with topical lidocaine.    Discussed evaluation with findings and plan with patient. Advised on need for outpatient follow up and given information in discharge instructions. Given return precautions verbally and in discharge instructions, confirmed with teach back method. All questions answered prior to discharge with verbal understanding/agreement with plan expressed.    Problems Addressed:  Thrombosed external hemorrhoid: acute illness or injury    Risk  OTC drugs.  Prescription drug management.        ED Course as of 04/02/25 1433   Tue Apr 01, 2025   1450 Excised thrombosed hemorrhoid at 10 o'clock position       Medications   lidocaine (URO-JET) 2 % urethral/mucosal gel 1 Application (1 Application Urethral Given by Other 4/1/25 1421)   lidocaine-epinephrine (XYLOCAINE/EPINEPHRINE) 1 %-1:100,000 injection 10 mL (10 mL Infiltration Given 4/1/25 1423)       ED Risk Strat Scores                            SBIRT 20yo+      Flowsheet Row Most Recent Value   Initial Alcohol Screen: US AUDIT-C     1. How often do you have a drink containing alcohol? 0 Filed at: 04/01/2025 1301   2. How many drinks containing alcohol do you have on a typical day you are drinking?  0  Filed at: 2025 1301   3a. Male UNDER 65: How often do you have five or more drinks on one occasion? 0 Filed at: 2025 1301   3b. FEMALE Any Age, or MALE 65+: How often do you have 4 or more drinks on one occassion? 0 Filed at: 2025 1301   Audit-C Score 0 Filed at: 2025 1301   SEVERINO: How many times in the past year have you...    Used an illegal drug or used a prescription medication for non-medical reasons? Never Filed at: 2025 1301                            History of Present Illness       Chief Complaint   Patient presents with    Hemorrhoids     Previous hx of; pt states these are the worse she has gotten. No bleeding        Past Medical History:   Diagnosis Date    Anemia     Asthma       Past Surgical History:   Procedure Laterality Date     SECTION      4 times    CHOLECYSTECTOMY LAPAROSCOPIC N/A 2020    Procedure: CHOLECYSTECTOMY LAPAROSCOPIC;  Surgeon: Gilles Hunt;  Location: [facility];  Service: General    GASTRECTOMY SLEEVE LAPAROSCOPIC        History reviewed. No pertinent family history.   Social History     Tobacco Use    Smoking status: Never    Smokeless tobacco: Never   Vaping Use    Vaping status: Never Used   Substance Use Topics    Alcohol use: Yes     Comment: socially    Drug use: No      E-Cigarette/Vaping    E-Cigarette Use Never User       E-Cigarette/Vaping Substances    Nicotine No     THC No     CBD No     Flavoring No     Other No       I have reviewed and agree with the history as documented.     HPI  Patient is a 34 y.o. female with PMHx external hemorrhoids who presents to the ED for evaluation of rectal pain with small amount of blood noted on toilet paper after bowel movements. Patient states pain began yesterday. She has been told about her hemorrhoids in the past but has not had them evaluated by GI/colorectal surgery. States she has a longstanding history of constipation. Has tried stool softeners in the past and several topical  treatments that have worked previously. Denies fevers, chills, headache, dizziness, cough, dyspnea, chest pain, back pain, abdominal pain, nausea, vomiting, diarrhea, dysuria, hematuria, rashes, or any other complaints or concerns at this time.    Review of Systems  All other systems reviewed and negative unless otherwise stated in HPI above.    Objective       ED Triage Vitals [04/01/25 1301]   Temperature Pulse Blood Pressure Respirations SpO2 Patient Position - Orthostatic VS   97.6 °F (36.4 °C) 69 113/63 18 98 % --      Temp Source Heart Rate Source BP Location FiO2 (%) Pain Score    Temporal Monitor -- -- --      Vitals      Date and Time Temp Pulse SpO2 Resp BP Pain Score FACES Pain Rating User   04/01/25 1301 97.6 °F (36.4 °C) 69 98 % 18 113/63 -- -- JR            Physical Exam  General: VSS, NAD, awake, alert.  Head: Normocephalic, atraumatic.  Eyes: Pupils equal and round. No scleral icterus.  ENT: Nose atraumatic. Normal phonation. No stridor. MMM.  Neck: Trachea midline. Supple.  CV: Warm and well perfused. Regular rhythm and normal rate.  Lungs: Unlabored, no tachypnea, no accessory muscle use.  Abd: Flat, non-distended.  MSK: Moving all extremities. No deformity/injury.  Skin: Dry, intact.   Neuro: GCS 15. Motor grossly intact.  : Chaperone present. Multiple external hemorrhoids noted with one thrombosed hemorrhoids at the 10 o'clock position.    Results Reviewed       None            No orders to display       General Procedure    Date/Time: 4/1/2025 2:30 PM    Performed by: Caroline Helton DO  Authorized by: Caroline Helton DO    Patient location:  ED  Assisting Provider(s): Yes (comment) (Dr. Roberson, Dr. Andujar)    Consent:     Consent obtained:  Verbal    Consent given by:  Patient    Risks discussed:  Bleeding, infection, pain, incomplete drainage and nerve damage    Alternatives discussed:  No treatment, delayed treatment, alternative treatment, observation and  referral  Universal protocol:     Procedure explained and questions answered to patient or proxy's satisfaction: yes      Relevant documents present and verified: yes      Test results available and properly labeled: yes      Required blood products, implants, devices, and special equipment available: yes      Immediately prior to procedure, a time out was called: yes      Patient identity confirmed:  Verbally with patient and hospital-assigned identification number  Indications:     Indications:  Thrombosed External Hemorrhoid  Pre-procedure details:     Skin preparation:  ChloraPrep    Preparation: Patient was prepped and draped in the usual sterile fashion    Anesthesia (see MAR for exact dosages):     Anesthesia method:  Local infiltration and topical application    Topical anesthetic:  Lidocaine gel    Local anesthetic:  Lidocaine 1% WITH epi  Procedure Detail:     Equipment used:  Scalpel (10 blade)    Procedure note (site, laterality, method, findings):  Patient placed in prone position. Thrombosed hemorrhoid at 10 o'clock position identified. Elliptical incision made with clot removed. Bleeding controlled with sterile gauze and pressure. Pain improved.  Post-procedure details:     Patient tolerance of procedure:  Tolerated well, no immediate complications      ED Medication and Procedure Management   Prior to Admission Medications   Prescriptions Last Dose Informant Patient Reported? Taking?   Caplyta 42 MG CAPS capsule   Yes No   Sig: TAKE 1 CAPSULE BY MOUTH AT BEDTIME**PA FAXED   Diclofenac Sodium (VOLTAREN) 1 %   No No   Sig: Apply 2 g topically 4 (four) times a day   Lidocaine (HM Lidocaine Patch) 4 % PTCH   No No   Sig: Apply 1 patch topically in the morning   acetaminophen (TYLENOL) 325 mg tablet  Self No No   Sig: Take 2 tablets (650 mg total) by mouth every 6 (six) hours as needed for mild pain   albuterol (PROVENTIL HFA,VENTOLIN HFA) 90 mcg/act inhaler  Self Yes No   Sig: Inhale 2 puffs every 6  (six) hours as needed for shortness of breath   cloNIDine (CATAPRES) 0.1 mg tablet   Yes No   Sig: Take 0.1 mg by mouth 2 (two) times a day   cyclobenzaprine (FLEXERIL) 5 mg tablet   No No   Sig: Take 1 tablet (5 mg total) by mouth 3 (three) times a day as needed for muscle spasms   ferrous sulfate 325 (65 Fe) mg tablet   No No   Sig: TAKE 1 TABLET BY MOUTH EVERY DAY WITH BREAKFAST   gabapentin (NEURONTIN) 100 mg capsule   No No   Sig: Take 1 capsule (100 mg total) by mouth 3 (three) times a day   hydrOXYzine pamoate (VISTARIL) 50 mg capsule   Yes No   Sig: Take 50 mg by mouth daily at bedtime   loratadine (CLARITIN) 10 mg tablet   No No   Sig: Take 1 tablet (10 mg total) by mouth daily   naproxen (Naprosyn) 500 mg tablet   No No   Sig: Take 1 tablet (500 mg total) by mouth 2 (two) times a day with meals for 7 days   prazosin (MINIPRESS) 2 mg capsule   Yes No   Sig: Take 2 mg by mouth daily at bedtime   senna-docusate sodium (SENOKOT S) 8.6-50 mg per tablet   No No   Sig: Take 1 tablet by mouth daily   sertraline (ZOLOFT) 50 mg tablet   Yes No   Sig: Take 50 mg by mouth every morning   tamsulosin (FLOMAX) 0.4 mg   No No   Sig: Take 1 capsule (0.4 mg total) by mouth daily with dinner for 14 days   topiramate (TOPAMAX) 25 mg tablet  Self Yes No      Facility-Administered Medications Last Administration Doses Remaining   medroxyPROGESTERone (DEPO-PROVERA) IM injection 150 mg 1/24/2024  1:18 PM         Discharge Medication List as of 4/1/2025  3:00 PM        START taking these medications    Details   lidocaine (XYLOCAINE) 5 % ointment Apply topically 2 (two) times a day as needed for mild pain (External Hemorrhoids) Apply to external hemorrhoids at least 30 minutes prior to having a bowel movement., Starting Tue 4/1/2025, Normal      polyethylene glycol (MIRALAX) 17 g packet Take 17 g by mouth daily, Starting Tue 4/1/2025, Normal      !! senna-docusate sodium (SENOKOT S) 8.6-50 mg per tablet Take 1 tablet by mouth  daily, Starting Tue 4/1/2025, Normal       !! - Potential duplicate medications found. Please discuss with provider.        CONTINUE these medications which have NOT CHANGED    Details   acetaminophen (TYLENOL) 325 mg tablet Take 2 tablets (650 mg total) by mouth every 6 (six) hours as needed for mild pain, Starting Fri 7/24/2020, Normal      albuterol (PROVENTIL HFA,VENTOLIN HFA) 90 mcg/act inhaler Inhale 2 puffs every 6 (six) hours as needed for shortness of breath, Historical Med      Caplyta 42 MG CAPS capsule TAKE 1 CAPSULE BY MOUTH AT BEDTIME**PA FAXED, Historical Med      cloNIDine (CATAPRES) 0.1 mg tablet Take 0.1 mg by mouth 2 (two) times a day, Starting Wed 3/8/2023, Historical Med      cyclobenzaprine (FLEXERIL) 5 mg tablet Take 1 tablet (5 mg total) by mouth 3 (three) times a day as needed for muscle spasms, Starting Fri 11/22/2024, Normal      Diclofenac Sodium (VOLTAREN) 1 % Apply 2 g topically 4 (four) times a day, Starting Fri 11/22/2024, Normal      ferrous sulfate 325 (65 Fe) mg tablet TAKE 1 TABLET BY MOUTH EVERY DAY WITH BREAKFAST, Normal      gabapentin (NEURONTIN) 100 mg capsule Take 1 capsule (100 mg total) by mouth 3 (three) times a day, Starting Thu 1/30/2025, Normal      hydrOXYzine pamoate (VISTARIL) 50 mg capsule Take 50 mg by mouth daily at bedtime, Starting Tue 2/7/2023, Historical Med      Lidocaine (HM Lidocaine Patch) 4 % PTCH Apply 1 patch topically in the morning, Starting Fri 11/22/2024, Normal      loratadine (CLARITIN) 10 mg tablet Take 1 tablet (10 mg total) by mouth daily, Starting Tue 5/28/2024, Until Fri 11/22/2024, Normal      naproxen (Naprosyn) 500 mg tablet Take 1 tablet (500 mg total) by mouth 2 (two) times a day with meals for 7 days, Starting Tue 10/24/2023, Until Fri 11/22/2024, Normal      prazosin (MINIPRESS) 2 mg capsule Take 2 mg by mouth daily at bedtime, Starting Tue 2/7/2023, Historical Med      !! senna-docusate sodium (SENOKOT S) 8.6-50 mg per tablet Take 1  tablet by mouth daily, Starting Thu 3/2/2023, Normal      sertraline (ZOLOFT) 50 mg tablet Take 50 mg by mouth every morning, Starting Tue 2/7/2023, Historical Med      tamsulosin (FLOMAX) 0.4 mg Take 1 capsule (0.4 mg total) by mouth daily with dinner for 14 days, Starting Tue 10/24/2023, Until Fri 11/22/2024, Normal      topiramate (TOPAMAX) 25 mg tablet Starting Mon 5/23/2022, Historical Med       !! - Potential duplicate medications found. Please discuss with provider.          ED SEPSIS DOCUMENTATION   Time reflects when diagnosis was documented in both MDM as applicable and the Disposition within this note       Time User Action Codes Description Comment    4/1/2025  2:50 PM Caroline Shen [K64.5] Thrombosed external hemorrhoid                  Caroline Shen,   04/02/25 1445

## 2025-04-08 ENCOUNTER — CONSULT (OUTPATIENT)
Age: 35
End: 2025-04-08
Payer: COMMERCIAL

## 2025-04-08 VITALS
HEART RATE: 76 BPM | OXYGEN SATURATION: 98 % | BODY MASS INDEX: 36.68 KG/M2 | HEIGHT: 63 IN | DIASTOLIC BLOOD PRESSURE: 63 MMHG | WEIGHT: 207 LBS | SYSTOLIC BLOOD PRESSURE: 102 MMHG

## 2025-04-08 DIAGNOSIS — K64.5 THROMBOSED EXTERNAL HEMORRHOID: ICD-10-CM

## 2025-04-08 PROCEDURE — 99242 OFF/OP CONSLTJ NEW/EST SF 20: CPT | Performed by: COLON & RECTAL SURGERY

## 2025-04-08 NOTE — PROGRESS NOTES
Assessment/Plan:  Thrombosed external hemorrhoid left lateral position without complication.    Plan: Conservative therapy recommended for patient surgical intervention not required at this time.       Diagnoses and all orders for this visit:    Thrombosed external hemorrhoid  -     Ambulatory Referral to Colorectal Surgery            Patient will follow-up as needed.    Patient advised to maintain high-fiber diet with fiber supplementation Citrucel 2 tablets p.o. twice daily to minimize constipation  Subjective:      Patient ID: Yamileth Stroud is a 34 y.o. female.    HPI  Patient is a 34-year-old woman who last week experienced acute onset of anal pain referred for evaluation and treatment of her hemorrhoidal condition  The following portions of the patient's history were reviewed and updated as appropriate:   She  has a past medical history of Anemia and Asthma.  She   Patient Active Problem List    Diagnosis Date Noted    Fungal infection 2024    Bipolar depression (HCC) 10/23/2023    Vitamin D deficiency 2023    Excessive bleeding in premenopausal period 2023    Nonintractable headache 2023    Iron deficiency anemia 2022    Ureterolithiasis 2022    Chronic idiopathic constipation 2022    Annual physical exam 2022    RUQ abdominal pain 2022    Asthma 2020    Choledocholithiasis 2020    Pain in both feet 10/22/2015    Chronic rhinitis 2015    Chronic tension-type headache, not intractable 2015    Classic migraine with aura 2015    Asthma, mild intermittent 2014    Carpal tunnel syndrome 2014     She  has a past surgical history that includes GASTRECTOMY SLEEVE LAPAROSCOPIC;  section; and CHOLECYSTECTOMY LAPAROSCOPIC (N/A, 2020).  Her family history is not on file.  She  reports that she has never smoked. She has never used smokeless tobacco. She reports current alcohol use. She reports that she does not  use drugs.  Current Outpatient Medications   Medication Sig Dispense Refill    acetaminophen (TYLENOL) 325 mg tablet Take 2 tablets (650 mg total) by mouth every 6 (six) hours as needed for mild pain 30 tablet 0    albuterol (PROVENTIL HFA,VENTOLIN HFA) 90 mcg/act inhaler Inhale 2 puffs every 6 (six) hours as needed for shortness of breath      Caplyta 42 MG CAPS capsule TAKE 1 CAPSULE BY MOUTH AT BEDTIME**PA FAXED      cloNIDine (CATAPRES) 0.1 mg tablet Take 0.1 mg by mouth 2 (two) times a day      cyclobenzaprine (FLEXERIL) 5 mg tablet Take 1 tablet (5 mg total) by mouth 3 (three) times a day as needed for muscle spasms 10 tablet 0    Diclofenac Sodium (VOLTAREN) 1 % Apply 2 g topically 4 (four) times a day 240 g 1    ferrous sulfate 325 (65 Fe) mg tablet TAKE 1 TABLET BY MOUTH EVERY DAY WITH BREAKFAST 90 tablet 2    gabapentin (NEURONTIN) 100 mg capsule Take 1 capsule (100 mg total) by mouth 3 (three) times a day 90 capsule 1    hydrOXYzine pamoate (VISTARIL) 50 mg capsule Take 50 mg by mouth daily at bedtime      Lidocaine (HM Lidocaine Patch) 4 % PTCH Apply 1 patch topically in the morning 15 patch 0    lidocaine (XYLOCAINE) 5 % ointment Apply topically 2 (two) times a day as needed for mild pain (External Hemorrhoids) Apply to external hemorrhoids at least 30 minutes prior to having a bowel movement. 50 g 0    polyethylene glycol (MIRALAX) 17 g packet Take 17 g by mouth daily 510 g 0    prazosin (MINIPRESS) 2 mg capsule Take 2 mg by mouth daily at bedtime      senna-docusate sodium (SENOKOT S) 8.6-50 mg per tablet Take 1 tablet by mouth daily 30 tablet 0    sertraline (ZOLOFT) 50 mg tablet Take 50 mg by mouth every morning      topiramate (TOPAMAX) 25 mg tablet       loratadine (CLARITIN) 10 mg tablet Take 1 tablet (10 mg total) by mouth daily 30 tablet 1    naproxen (Naprosyn) 500 mg tablet Take 1 tablet (500 mg total) by mouth 2 (two) times a day with meals for 7 days 14 tablet 0    senna-docusate sodium  (SENOKOT S) 8.6-50 mg per tablet Take 1 tablet by mouth daily 90 tablet 0    tamsulosin (FLOMAX) 0.4 mg Take 1 capsule (0.4 mg total) by mouth daily with dinner for 14 days 14 capsule 0     Current Facility-Administered Medications   Medication Dose Route Frequency Provider Last Rate Last Admin    medroxyPROGESTERone (DEPO-PROVERA) IM injection 150 mg  150 mg Intramuscular Q3 Months Loree Han, DO   150 mg at 01/24/24 6208     Current Outpatient Medications on File Prior to Visit   Medication Sig    acetaminophen (TYLENOL) 325 mg tablet Take 2 tablets (650 mg total) by mouth every 6 (six) hours as needed for mild pain    albuterol (PROVENTIL HFA,VENTOLIN HFA) 90 mcg/act inhaler Inhale 2 puffs every 6 (six) hours as needed for shortness of breath    Caplyta 42 MG CAPS capsule TAKE 1 CAPSULE BY MOUTH AT BEDTIME**PA FAXED    cloNIDine (CATAPRES) 0.1 mg tablet Take 0.1 mg by mouth 2 (two) times a day    cyclobenzaprine (FLEXERIL) 5 mg tablet Take 1 tablet (5 mg total) by mouth 3 (three) times a day as needed for muscle spasms    Diclofenac Sodium (VOLTAREN) 1 % Apply 2 g topically 4 (four) times a day    ferrous sulfate 325 (65 Fe) mg tablet TAKE 1 TABLET BY MOUTH EVERY DAY WITH BREAKFAST    gabapentin (NEURONTIN) 100 mg capsule Take 1 capsule (100 mg total) by mouth 3 (three) times a day    hydrOXYzine pamoate (VISTARIL) 50 mg capsule Take 50 mg by mouth daily at bedtime    Lidocaine (HM Lidocaine Patch) 4 % PTCH Apply 1 patch topically in the morning    lidocaine (XYLOCAINE) 5 % ointment Apply topically 2 (two) times a day as needed for mild pain (External Hemorrhoids) Apply to external hemorrhoids at least 30 minutes prior to having a bowel movement.    polyethylene glycol (MIRALAX) 17 g packet Take 17 g by mouth daily    prazosin (MINIPRESS) 2 mg capsule Take 2 mg by mouth daily at bedtime    senna-docusate sodium (SENOKOT S) 8.6-50 mg per tablet Take 1 tablet by mouth daily    sertraline (ZOLOFT) 50 mg tablet  "Take 50 mg by mouth every morning    topiramate (TOPAMAX) 25 mg tablet     loratadine (CLARITIN) 10 mg tablet Take 1 tablet (10 mg total) by mouth daily    naproxen (Naprosyn) 500 mg tablet Take 1 tablet (500 mg total) by mouth 2 (two) times a day with meals for 7 days    senna-docusate sodium (SENOKOT S) 8.6-50 mg per tablet Take 1 tablet by mouth daily    tamsulosin (FLOMAX) 0.4 mg Take 1 capsule (0.4 mg total) by mouth daily with dinner for 14 days     Current Facility-Administered Medications on File Prior to Visit   Medication    medroxyPROGESTERone (DEPO-PROVERA) IM injection 150 mg     She has no known allergies..    Review of Systems      Objective:      /63   Pulse 76   Ht 5' 3\" (1.6 m)   Wt 93.9 kg (207 lb)   SpO2 98%   BMI 36.67 kg/m²          Colorectal Physical Exam      "

## 2025-05-16 ENCOUNTER — TELEPHONE (OUTPATIENT)
Age: 35
End: 2025-05-16

## 2025-05-16 NOTE — TELEPHONE ENCOUNTER
Called patient left message to return my call to my call to reschedule 6/3/2025 appointment with Dr Banerjee.

## (undated) DEVICE — SUT VICRYL 0 UR-6 27 IN J603H

## (undated) DEVICE — Device: Brand: OMNICLOSE TROCAR SITE CLOSURE DEVICE

## (undated) DEVICE — NEEDLE 25G X 1 1/2

## (undated) DEVICE — TROCAR: Brand: KII FIOS FIRST ENTRY

## (undated) DEVICE — ELECTRODE LAP J HOOK E-Z CLEAN 33CM-0021

## (undated) DEVICE — LIGAMAX 5 MM ENDOSCOPIC MULTIPLE CLIP APPLIER: Brand: LIGAMAX

## (undated) DEVICE — GLOVE SRG BIOGEL 7.5

## (undated) DEVICE — ADHESIVE SKIN HIGH VISCOSITY EXOFIN 1ML

## (undated) DEVICE — CHLORAPREP HI-LITE 26ML ORANGE

## (undated) DEVICE — SUT MONOCRYL 4-0 PS-2 18 IN Y496G

## (undated) DEVICE — TROCAR: Brand: KII® SLEEVE

## (undated) DEVICE — PENCIL ELECTROSURG E-Z CLEAN -0035H

## (undated) DEVICE — GLOVE INDICATOR PI UNDERGLOVE SZ 8 BLUE

## (undated) DEVICE — PACK PBDS LAP CHOLE RF

## (undated) DEVICE — 3000CC GUARDIAN II: Brand: GUARDIAN

## (undated) DEVICE — INTENDED FOR TISSUE SEPARATION, AND OTHER PROCEDURES THAT REQUIRE A SHARP SURGICAL BLADE TO PUNCTURE OR CUT.: Brand: BARD-PARKER SAFETY BLADES SIZE 11, STERILE